# Patient Record
Sex: MALE | Race: WHITE | NOT HISPANIC OR LATINO | Employment: UNEMPLOYED | URBAN - METROPOLITAN AREA
[De-identification: names, ages, dates, MRNs, and addresses within clinical notes are randomized per-mention and may not be internally consistent; named-entity substitution may affect disease eponyms.]

---

## 2023-01-20 PROBLEM — M25.69 DECREASED RANGE OF MOTION OF TRUNK AND BACK: Status: ACTIVE | Noted: 2023-01-20

## 2023-01-20 PROBLEM — I10 ESSENTIAL HYPERTENSION: Status: ACTIVE | Noted: 2023-01-20

## 2023-01-20 PROBLEM — G47.33 OBSTRUCTIVE SLEEP APNEA: Status: ACTIVE | Noted: 2023-01-20

## 2023-01-20 PROBLEM — J42 CHRONIC BRONCHITIS (MULTI): Status: ACTIVE | Noted: 2023-01-20

## 2023-01-20 PROBLEM — G56.03 CARPAL TUNNEL SYNDROME, BILATERAL: Status: ACTIVE | Noted: 2023-01-20

## 2023-01-20 PROBLEM — M54.50 BACK PAIN, LUMBOSACRAL: Status: ACTIVE | Noted: 2023-01-20

## 2023-01-20 PROBLEM — H52.10 MYOPIA WITH PRESBYOPIA: Status: ACTIVE | Noted: 2023-01-20

## 2023-01-20 PROBLEM — R20.2 PARESTHESIAS: Status: ACTIVE | Noted: 2023-01-20

## 2023-01-20 PROBLEM — M48.07 SPINAL STENOSIS OF LUMBOSACRAL REGION: Status: ACTIVE | Noted: 2023-01-20

## 2023-01-20 PROBLEM — M51.369 DEGENERATION OF INTERVERTEBRAL DISC OF LUMBAR REGION: Status: ACTIVE | Noted: 2023-01-20

## 2023-01-20 PROBLEM — Z86.73 HISTORY OF CVA (CEREBROVASCULAR ACCIDENT): Status: ACTIVE | Noted: 2023-01-20

## 2023-01-20 PROBLEM — G89.18 POST-OP PAIN: Status: ACTIVE | Noted: 2023-01-20

## 2023-01-20 PROBLEM — G97.1 POST-DURAL PUNCTURE HEADACHE: Status: ACTIVE | Noted: 2023-01-20

## 2023-01-20 PROBLEM — R53.83 FATIGUE: Status: ACTIVE | Noted: 2023-01-20

## 2023-01-20 PROBLEM — I63.9 CEREBROVASCULAR ACCIDENT (MULTI): Status: ACTIVE | Noted: 2023-01-20

## 2023-01-20 PROBLEM — M54.12 CERVICAL RADICULOPATHY AT C7: Status: ACTIVE | Noted: 2023-01-20

## 2023-01-20 PROBLEM — I25.10 ATHEROSCLEROTIC HEART DISEASE OF NATIVE CORONARY ARTERY WITHOUT ANGINA PECTORIS: Status: ACTIVE | Noted: 2023-01-20

## 2023-01-20 PROBLEM — I51.89 DIASTOLIC DYSFUNCTION: Status: ACTIVE | Noted: 2023-01-20

## 2023-01-20 PROBLEM — M47.816 LUMBAR SPONDYLOSIS: Status: ACTIVE | Noted: 2023-01-20

## 2023-01-20 PROBLEM — F10.20 ALCOHOLISM, CHRONIC (MULTI): Status: ACTIVE | Noted: 2023-01-20

## 2023-01-20 PROBLEM — J44.9 CHRONIC OBSTRUCTIVE PULMONARY DISEASE, UNSPECIFIED (MULTI): Status: ACTIVE | Noted: 2023-01-20

## 2023-01-20 PROBLEM — G25.81 RESTLESS LEGS: Status: ACTIVE | Noted: 2023-01-20

## 2023-01-20 PROBLEM — H25.813 COMBINED FORM OF AGE-RELATED CATARACT, BOTH EYES: Status: ACTIVE | Noted: 2023-01-20

## 2023-01-20 PROBLEM — I25.10 CAD (CORONARY ARTERY DISEASE): Status: ACTIVE | Noted: 2023-01-20

## 2023-01-20 PROBLEM — R25.2 LEG CRAMP: Status: ACTIVE | Noted: 2023-01-20

## 2023-01-20 PROBLEM — K76.0 HEPATIC STEATOSIS: Status: ACTIVE | Noted: 2023-01-20

## 2023-01-20 PROBLEM — R22.32 MASS OF LEFT ELBOW: Status: ACTIVE | Noted: 2023-01-20

## 2023-01-20 PROBLEM — M54.16 LUMBAR RADICULOPATHY: Status: ACTIVE | Noted: 2023-01-20

## 2023-01-20 PROBLEM — M19.031 LOCALIZED PRIMARY OSTEOARTHRITIS OF CARPOMETACARPAL (CMC) JOINT OF RIGHT WRIST: Status: ACTIVE | Noted: 2023-01-20

## 2023-01-20 PROBLEM — F41.9 ANXIETY AND DEPRESSION: Status: ACTIVE | Noted: 2023-01-20

## 2023-01-20 PROBLEM — G56.22 ULNAR NEUROPATHY AT ELBOW OF LEFT UPPER EXTREMITY: Status: ACTIVE | Noted: 2023-01-20

## 2023-01-20 PROBLEM — F32.A ANXIETY AND DEPRESSION: Status: ACTIVE | Noted: 2023-01-20

## 2023-01-20 PROBLEM — N52.9 ERECTILE DYSFUNCTION: Status: ACTIVE | Noted: 2023-01-20

## 2023-01-20 PROBLEM — R06.02 SHORTNESS OF BREATH ON EXERTION: Status: ACTIVE | Noted: 2023-01-20

## 2023-01-20 PROBLEM — F17.210 CIGARETTE SMOKER: Status: ACTIVE | Noted: 2023-01-20

## 2023-01-20 PROBLEM — E78.5 HYPERLIPIDEMIA: Status: ACTIVE | Noted: 2023-01-20

## 2023-01-20 PROBLEM — M51.36 DEGENERATION OF INTERVERTEBRAL DISC OF LUMBAR REGION: Status: ACTIVE | Noted: 2023-01-20

## 2023-01-20 PROBLEM — H52.4 MYOPIA WITH PRESBYOPIA: Status: ACTIVE | Noted: 2023-01-20

## 2023-01-20 PROBLEM — N40.0 BPH (BENIGN PROSTATIC HYPERPLASIA): Status: ACTIVE | Noted: 2023-01-20

## 2023-01-20 RX ORDER — UMECLIDINIUM BROMIDE AND VILANTEROL TRIFENATATE 62.5; 25 UG/1; UG/1
1 POWDER RESPIRATORY (INHALATION) DAILY
COMMUNITY

## 2023-01-20 RX ORDER — NALOXONE HYDROCHLORIDE 4 MG/.1ML
4 SPRAY NASAL AS NEEDED
COMMUNITY
End: 2023-10-09 | Stop reason: ALTCHOICE

## 2023-01-20 RX ORDER — GABAPENTIN 400 MG/1
400 CAPSULE ORAL NIGHTLY
COMMUNITY
End: 2023-09-12 | Stop reason: ALTCHOICE

## 2023-01-20 RX ORDER — EZETIMIBE 10 MG/1
10 TABLET ORAL DAILY
COMMUNITY
End: 2023-09-12 | Stop reason: ALTCHOICE

## 2023-01-20 RX ORDER — OXYCODONE AND ACETAMINOPHEN 10; 325 MG/1; MG/1
1 TABLET ORAL EVERY 4 HOURS PRN
COMMUNITY
End: 2023-09-12 | Stop reason: ALTCHOICE

## 2023-01-20 RX ORDER — ATORVASTATIN CALCIUM 80 MG/1
1 TABLET, FILM COATED ORAL DAILY
COMMUNITY
End: 2023-03-07 | Stop reason: SDUPTHER

## 2023-01-20 RX ORDER — AMLODIPINE BESYLATE 5 MG/1
5 TABLET ORAL DAILY
COMMUNITY
End: 2023-09-12 | Stop reason: SDUPTHER

## 2023-01-20 RX ORDER — TIZANIDINE HYDROCHLORIDE 2 MG/1
2 CAPSULE, GELATIN COATED ORAL
COMMUNITY
End: 2023-03-07

## 2023-01-20 RX ORDER — NAPROXEN SODIUM 220 MG/1
81 TABLET, FILM COATED ORAL DAILY
COMMUNITY

## 2023-01-20 RX ORDER — ALBUTEROL SULFATE 90 UG/1
2 AEROSOL, METERED RESPIRATORY (INHALATION) EVERY 4 HOURS PRN
COMMUNITY

## 2023-01-20 RX ORDER — SERTRALINE HYDROCHLORIDE 100 MG/1
100 TABLET, FILM COATED ORAL DAILY
COMMUNITY
End: 2023-09-12 | Stop reason: ALTCHOICE

## 2023-01-20 RX ORDER — LISINOPRIL 40 MG/1
40 TABLET ORAL DAILY
COMMUNITY
End: 2023-05-14

## 2023-03-07 ENCOUNTER — LAB (OUTPATIENT)
Dept: LAB | Facility: LAB | Age: 63
End: 2023-03-07
Payer: COMMERCIAL

## 2023-03-07 ENCOUNTER — OFFICE VISIT (OUTPATIENT)
Dept: PRIMARY CARE | Facility: CLINIC | Age: 63
End: 2023-03-07
Payer: COMMERCIAL

## 2023-03-07 VITALS
OXYGEN SATURATION: 96 % | WEIGHT: 150 LBS | HEART RATE: 88 BPM | HEIGHT: 66 IN | TEMPERATURE: 97.8 F | BODY MASS INDEX: 24.11 KG/M2 | RESPIRATION RATE: 16 BRPM | SYSTOLIC BLOOD PRESSURE: 119 MMHG | DIASTOLIC BLOOD PRESSURE: 74 MMHG

## 2023-03-07 DIAGNOSIS — I10 ESSENTIAL HYPERTENSION: Primary | ICD-10-CM

## 2023-03-07 DIAGNOSIS — I10 ESSENTIAL HYPERTENSION: ICD-10-CM

## 2023-03-07 DIAGNOSIS — G47.33 OBSTRUCTIVE SLEEP APNEA: ICD-10-CM

## 2023-03-07 DIAGNOSIS — M54.16 LUMBAR RADICULOPATHY: ICD-10-CM

## 2023-03-07 DIAGNOSIS — F17.210 CIGARETTE SMOKER: ICD-10-CM

## 2023-03-07 DIAGNOSIS — F32.A ANXIETY AND DEPRESSION: ICD-10-CM

## 2023-03-07 DIAGNOSIS — I25.10 ATHEROSCLEROSIS OF NATIVE CORONARY ARTERY OF NATIVE HEART WITHOUT ANGINA PECTORIS: ICD-10-CM

## 2023-03-07 DIAGNOSIS — F41.9 ANXIETY AND DEPRESSION: ICD-10-CM

## 2023-03-07 DIAGNOSIS — J43.9 PULMONARY EMPHYSEMA, UNSPECIFIED EMPHYSEMA TYPE (MULTI): ICD-10-CM

## 2023-03-07 DIAGNOSIS — J41.1 MUCOPURULENT CHRONIC BRONCHITIS (MULTI): ICD-10-CM

## 2023-03-07 DIAGNOSIS — E78.2 MIXED HYPERLIPIDEMIA: ICD-10-CM

## 2023-03-07 DIAGNOSIS — M48.07 SPINAL STENOSIS OF LUMBOSACRAL REGION: ICD-10-CM

## 2023-03-07 DIAGNOSIS — N52.8 OTHER MALE ERECTILE DYSFUNCTION: ICD-10-CM

## 2023-03-07 DIAGNOSIS — I25.119 CORONARY ARTERY DISEASE INVOLVING NATIVE HEART WITH ANGINA PECTORIS, UNSPECIFIED VESSEL OR LESION TYPE (CMS-HCC): ICD-10-CM

## 2023-03-07 PROBLEM — R22.32 MASS OF LEFT ELBOW: Status: RESOLVED | Noted: 2023-01-20 | Resolved: 2023-03-07

## 2023-03-07 PROBLEM — R06.02 SHORTNESS OF BREATH ON EXERTION: Status: RESOLVED | Noted: 2023-01-20 | Resolved: 2023-03-07

## 2023-03-07 LAB
ALANINE AMINOTRANSFERASE (SGPT) (U/L) IN SER/PLAS: 45 U/L (ref 10–52)
ALBUMIN (G/DL) IN SER/PLAS: 4.8 G/DL (ref 3.4–5)
ALKALINE PHOSPHATASE (U/L) IN SER/PLAS: 81 U/L (ref 33–136)
ANION GAP IN SER/PLAS: 9 MMOL/L (ref 10–20)
ASPARTATE AMINOTRANSFERASE (SGOT) (U/L) IN SER/PLAS: 42 U/L (ref 9–39)
BILIRUBIN TOTAL (MG/DL) IN SER/PLAS: 0.4 MG/DL (ref 0–1.2)
CALCIUM (MG/DL) IN SER/PLAS: 9.6 MG/DL (ref 8.6–10.3)
CARBON DIOXIDE, TOTAL (MMOL/L) IN SER/PLAS: 27 MMOL/L (ref 21–32)
CHLORIDE (MMOL/L) IN SER/PLAS: 104 MMOL/L (ref 98–107)
CHOLESTEROL (MG/DL) IN SER/PLAS: 158 MG/DL (ref 0–199)
CHOLESTEROL IN HDL (MG/DL) IN SER/PLAS: 47 MG/DL
CHOLESTEROL/HDL RATIO: 3.4
CREATININE (MG/DL) IN SER/PLAS: 0.75 MG/DL (ref 0.5–1.3)
GFR MALE: >90 ML/MIN/1.73M2
GLUCOSE (MG/DL) IN SER/PLAS: 95 MG/DL (ref 74–99)
LDL: 83 MG/DL (ref 0–99)
POTASSIUM (MMOL/L) IN SER/PLAS: 5.1 MMOL/L (ref 3.5–5.3)
PROTEIN TOTAL: 7.5 G/DL (ref 6.4–8.2)
SODIUM (MMOL/L) IN SER/PLAS: 135 MMOL/L (ref 136–145)
TRIGLYCERIDE (MG/DL) IN SER/PLAS: 139 MG/DL (ref 0–149)
UREA NITROGEN (MG/DL) IN SER/PLAS: 22 MG/DL (ref 6–23)
VLDL: 28 MG/DL (ref 0–40)

## 2023-03-07 PROCEDURE — 3078F DIAST BP <80 MM HG: CPT | Performed by: STUDENT IN AN ORGANIZED HEALTH CARE EDUCATION/TRAINING PROGRAM

## 2023-03-07 PROCEDURE — 80061 LIPID PANEL: CPT

## 2023-03-07 PROCEDURE — 1036F TOBACCO NON-USER: CPT | Performed by: STUDENT IN AN ORGANIZED HEALTH CARE EDUCATION/TRAINING PROGRAM

## 2023-03-07 PROCEDURE — 3074F SYST BP LT 130 MM HG: CPT | Performed by: STUDENT IN AN ORGANIZED HEALTH CARE EDUCATION/TRAINING PROGRAM

## 2023-03-07 PROCEDURE — 80053 COMPREHEN METABOLIC PANEL: CPT

## 2023-03-07 PROCEDURE — 99214 OFFICE O/P EST MOD 30 MIN: CPT | Performed by: STUDENT IN AN ORGANIZED HEALTH CARE EDUCATION/TRAINING PROGRAM

## 2023-03-07 PROCEDURE — 36415 COLL VENOUS BLD VENIPUNCTURE: CPT

## 2023-03-07 RX ORDER — ATORVASTATIN CALCIUM 80 MG/1
80 TABLET, FILM COATED ORAL DAILY
Qty: 30 TABLET | Refills: 3 | Status: SHIPPED | OUTPATIENT
Start: 2023-03-07 | End: 2023-09-12 | Stop reason: SDUPTHER

## 2023-03-07 RX ORDER — SILDENAFIL 100 MG/1
1 TABLET, FILM COATED ORAL 2 TIMES WEEKLY
COMMUNITY
Start: 2023-02-03 | End: 2023-09-12 | Stop reason: SDUPTHER

## 2023-03-07 RX ORDER — CARVEDILOL 6.25 MG/1
6.25 TABLET ORAL
COMMUNITY
Start: 2023-03-02

## 2023-03-07 RX ORDER — OXYCODONE HYDROCHLORIDE 10 MG/1
10 TABLET ORAL EVERY 4 HOURS PRN
COMMUNITY
Start: 2022-11-23 | End: 2023-09-12 | Stop reason: ALTCHOICE

## 2023-03-07 SDOH — ECONOMIC STABILITY: FOOD INSECURITY: WITHIN THE PAST 12 MONTHS, YOU WORRIED THAT YOUR FOOD WOULD RUN OUT BEFORE YOU GOT MONEY TO BUY MORE.: NEVER TRUE

## 2023-03-07 SDOH — ECONOMIC STABILITY: FOOD INSECURITY: WITHIN THE PAST 12 MONTHS, THE FOOD YOU BOUGHT JUST DIDN'T LAST AND YOU DIDN'T HAVE MONEY TO GET MORE.: NEVER TRUE

## 2023-03-07 ASSESSMENT — PATIENT HEALTH QUESTIONNAIRE - PHQ9
2. FEELING DOWN, DEPRESSED OR HOPELESS: NOT AT ALL
SUM OF ALL RESPONSES TO PHQ9 QUESTIONS 1 & 2: 0
1. LITTLE INTEREST OR PLEASURE IN DOING THINGS: NOT AT ALL

## 2023-03-07 ASSESSMENT — LIFESTYLE VARIABLES
SKIP TO QUESTIONS 9-10: 0
HOW MANY STANDARD DRINKS CONTAINING ALCOHOL DO YOU HAVE ON A TYPICAL DAY: 5 OR 6
HOW OFTEN DO YOU HAVE A DRINK CONTAINING ALCOHOL: 4 OR MORE TIMES A WEEK
HOW OFTEN DO YOU HAVE SIX OR MORE DRINKS ON ONE OCCASION: DAILY OR ALMOST DAILY
AUDIT-C TOTAL SCORE: 10

## 2023-03-07 ASSESSMENT — ENCOUNTER SYMPTOMS
LOSS OF SENSATION IN FEET: 1
OCCASIONAL FEELINGS OF UNSTEADINESS: 0
DEPRESSION: 0

## 2023-03-07 NOTE — ASSESSMENT & PLAN NOTE
Chronic  S/p laminectomy by Dr. Lin   Currently on gabapentin, percocet as needed by pain management and oxycodone by orthopedics  High recommend to not use percocet and oxycodone at same time. Pt understands and states using those only as needed and separately.  Continue with pain management and orthopedics

## 2023-03-07 NOTE — PROGRESS NOTES
"Subjective   Patient ID: Isael Haji is a 62 y.o. male who presents for Follow-up (6 month follow-up. No health concerns.).    Pt presenting to follow up. Overall doing ok.  Has had back surgery last yesterday which did not seem to help too much with her neuralgia. He continues to have some pain radiating down his legs. He continues to see pain management as well.   Concerning his breathing, he states that he has yet to complete his PFTs ordered by his pulmonologist. He denies any cp, sob, dizziness, HA, leg edema.  Still not using his cpap, he is not able to tolerate it.          Review of Systems   All other systems reviewed and are negative.      Objective   /74 (BP Location: Left arm, Patient Position: Sitting, BP Cuff Size: Large adult)   Pulse 88   Temp 36.6 °C (97.8 °F)   Resp 16   Ht 1.664 m (5' 5.5\")   Wt 68 kg (150 lb)   SpO2 96%   BMI 24.58 kg/m²     Physical Exam  Vitals and nursing note reviewed.   Constitutional:       General: He is not in acute distress.     Appearance: He is not ill-appearing.   HENT:      Head: Normocephalic and atraumatic.   Cardiovascular:      Rate and Rhythm: Normal rate and regular rhythm.      Pulses: Normal pulses.      Heart sounds: Normal heart sounds. No murmur heard.     No friction rub. No gallop.   Pulmonary:      Effort: Pulmonary effort is normal. No respiratory distress.      Breath sounds: Normal breath sounds. No stridor. No wheezing, rhonchi or rales.   Neurological:      Mental Status: He is alert and oriented to person, place, and time.   Psychiatric:         Mood and Affect: Mood normal.         Assessment/Plan   Problem List Items Addressed This Visit          Nervous    Lumbar radiculopathy     Chronic  S/p laminectomy by Dr. Lin   Currently on gabapentin, percocet as needed by pain management and oxycodone by orthopedics  High recommend to not use percocet and oxycodone at same time. Pt understands and states using those only as needed and " separately.  Continue with pain management and orthopedics         Obstructive sleep apnea     Chronic and mild  Intolerant of cpap   Encourage compliance         Relevant Orders    Follow Up In Primary Care    Spinal stenosis of lumbosacral region       Respiratory    Chronic bronchitis (CMS/HCC)     Stable  Continue with anoro ellipta and albuterol as needed  Encouraged to stop smoking         Relevant Orders    Follow Up In Primary Care    Chronic obstructive pulmonary disease, unspecified (CMS/HCC)     Stable  Continue with anoro ellipta and albuterol as needed  Encouraged to stop smoking         Relevant Orders    Follow Up In Primary Care    Follow Up In Primary Care       Circulatory    Atherosclerotic heart disease of native coronary artery without angina pectoris     Stable  Continue with zetia and atorvastatin         Relevant Medications    atorvastatin (Lipitor) 80 mg tablet    carvedilol (Coreg) 6.25 mg tablet    sildenafil (Viagra) 100 mg tablet    Other Relevant Orders    Follow Up In Primary Care    CAD (coronary artery disease)     Stable  Continue with zetia and atorvastatin         Relevant Medications    atorvastatin (Lipitor) 80 mg tablet    carvedilol (Coreg) 6.25 mg tablet    sildenafil (Viagra) 100 mg tablet    Other Relevant Orders    Follow Up In Primary Care    Essential hypertension - Primary     Chronic and stable  Continue with lisinopril, amlodipine and carvedilol         Relevant Orders    Comprehensive Metabolic Panel    Follow Up In Primary Care       Genitourinary    Erectile dysfunction     Chronic and stable  Follows up with urology  Viagra 100 mg twice a week as needed          Relevant Orders    Follow Up In Primary Care       Other    Anxiety and depression     Stable  Continue with sertraline         Relevant Orders    Follow Up In Primary Care    Cigarette smoker     Chronic  Continue to smoke- not interested in quitting         Hyperlipidemia     Stable  Continue with  crestor and atorvastatin         Relevant Medications    atorvastatin (Lipitor) 80 mg tablet    Other Relevant Orders    Lipid Panel    Follow Up In Primary Care

## 2023-03-07 NOTE — ASSESSMENT & PLAN NOTE
>>ASSESSMENT AND PLAN FOR CAD (CORONARY ARTERY DISEASE) WRITTEN ON 3/7/2023  9:14 AM BY EDNA FOREMAN MD    Stable  Continue with zetia and atorvastatin

## 2023-03-07 NOTE — ASSESSMENT & PLAN NOTE
>>ASSESSMENT AND PLAN FOR CIGARETTE SMOKER WRITTEN ON 3/7/2023  9:15 AM BY EDNA FOREMAN MD    Chronic  Continue to smoke- not interested in quitting

## 2023-05-12 DIAGNOSIS — I25.10 ATHEROSCLEROSIS OF NATIVE CORONARY ARTERY OF NATIVE HEART WITHOUT ANGINA PECTORIS: Primary | ICD-10-CM

## 2023-05-12 RX ORDER — HYDROCODONE BITARTRATE AND ACETAMINOPHEN 5; 325 MG/1; MG/1
TABLET ORAL
COMMUNITY
Start: 2022-05-04 | End: 2023-09-12 | Stop reason: ALTCHOICE

## 2023-05-14 RX ORDER — LISINOPRIL 40 MG/1
TABLET ORAL
Qty: 90 TABLET | Refills: 2 | Status: SHIPPED | OUTPATIENT
Start: 2023-05-14 | End: 2023-09-12 | Stop reason: SDUPTHER

## 2023-06-02 LAB
ALANINE AMINOTRANSFERASE (SGPT) (U/L) IN SER/PLAS: 64 U/L (ref 10–52)
ALBUMIN (G/DL) IN SER/PLAS: 4.6 G/DL (ref 3.4–5)
ALKALINE PHOSPHATASE (U/L) IN SER/PLAS: 69 U/L (ref 33–136)
ANION GAP IN SER/PLAS: 11 MMOL/L (ref 10–20)
ASPARTATE AMINOTRANSFERASE (SGOT) (U/L) IN SER/PLAS: 65 U/L (ref 9–39)
BILIRUBIN TOTAL (MG/DL) IN SER/PLAS: 0.5 MG/DL (ref 0–1.2)
CALCIUM (MG/DL) IN SER/PLAS: 9.7 MG/DL (ref 8.6–10.3)
CARBON DIOXIDE, TOTAL (MMOL/L) IN SER/PLAS: 27 MMOL/L (ref 21–32)
CHLORIDE (MMOL/L) IN SER/PLAS: 104 MMOL/L (ref 98–107)
CHOLESTEROL (MG/DL) IN SER/PLAS: 109 MG/DL (ref 0–199)
CHOLESTEROL IN HDL (MG/DL) IN SER/PLAS: 53 MG/DL
CHOLESTEROL/HDL RATIO: 2.1
CREATININE (MG/DL) IN SER/PLAS: 0.85 MG/DL (ref 0.5–1.3)
ERYTHROCYTE DISTRIBUTION WIDTH (RATIO) BY AUTOMATED COUNT: 13.6 % (ref 11.5–14.5)
ERYTHROCYTE MEAN CORPUSCULAR HEMOGLOBIN CONCENTRATION (G/DL) BY AUTOMATED: 33.8 G/DL (ref 32–36)
ERYTHROCYTE MEAN CORPUSCULAR VOLUME (FL) BY AUTOMATED COUNT: 99 FL (ref 80–100)
ERYTHROCYTES (10*6/UL) IN BLOOD BY AUTOMATED COUNT: 3.78 X10E12/L (ref 4.5–5.9)
GFR MALE: >90 ML/MIN/1.73M2
GLUCOSE (MG/DL) IN SER/PLAS: 99 MG/DL (ref 74–99)
HEMATOCRIT (%) IN BLOOD BY AUTOMATED COUNT: 37.3 % (ref 41–52)
HEMOGLOBIN (G/DL) IN BLOOD: 12.6 G/DL (ref 13.5–17.5)
LDL: 48 MG/DL (ref 0–99)
LEUKOCYTES (10*3/UL) IN BLOOD BY AUTOMATED COUNT: 7.7 X10E9/L (ref 4.4–11.3)
MAGNESIUM (MG/DL) IN SER/PLAS: 1.7 MG/DL (ref 1.6–2.4)
PLATELETS (10*3/UL) IN BLOOD AUTOMATED COUNT: 227 X10E9/L (ref 150–450)
POTASSIUM (MMOL/L) IN SER/PLAS: 4.5 MMOL/L (ref 3.5–5.3)
PROTEIN TOTAL: 7.1 G/DL (ref 6.4–8.2)
SODIUM (MMOL/L) IN SER/PLAS: 137 MMOL/L (ref 136–145)
TRIGLYCERIDE (MG/DL) IN SER/PLAS: 41 MG/DL (ref 0–149)
UREA NITROGEN (MG/DL) IN SER/PLAS: 11 MG/DL (ref 6–23)
VLDL: 8 MG/DL (ref 0–40)

## 2023-08-31 PROBLEM — R25.2 MUSCLE CRAMPING: Status: ACTIVE | Noted: 2023-08-31

## 2023-08-31 PROBLEM — M79.2 NEUROPATHIC PAIN: Status: ACTIVE | Noted: 2023-08-31

## 2023-08-31 RX ORDER — METHOCARBAMOL 500 MG/1
1 TABLET, FILM COATED ORAL 2 TIMES DAILY PRN
COMMUNITY
Start: 2023-08-08 | End: 2023-09-12 | Stop reason: ALTCHOICE

## 2023-09-12 ENCOUNTER — OFFICE VISIT (OUTPATIENT)
Dept: PRIMARY CARE | Facility: CLINIC | Age: 63
End: 2023-09-12
Payer: COMMERCIAL

## 2023-09-12 VITALS
BODY MASS INDEX: 22.82 KG/M2 | SYSTOLIC BLOOD PRESSURE: 121 MMHG | HEIGHT: 66 IN | OXYGEN SATURATION: 99 % | WEIGHT: 142 LBS | HEART RATE: 74 BPM | TEMPERATURE: 96.7 F | RESPIRATION RATE: 16 BRPM | DIASTOLIC BLOOD PRESSURE: 81 MMHG

## 2023-09-12 DIAGNOSIS — R25.2 LEG CRAMP: ICD-10-CM

## 2023-09-12 DIAGNOSIS — F41.9 ANXIETY AND DEPRESSION: ICD-10-CM

## 2023-09-12 DIAGNOSIS — M54.50 BACK PAIN, LUMBOSACRAL: ICD-10-CM

## 2023-09-12 DIAGNOSIS — F32.A ANXIETY AND DEPRESSION: ICD-10-CM

## 2023-09-12 DIAGNOSIS — Z23 NEED FOR VACCINATION AGAINST STREPTOCOCCUS PNEUMONIAE USING PNEUMOCOCCAL CONJUGATE VACCINE 13: ICD-10-CM

## 2023-09-12 DIAGNOSIS — Z23 NEED FOR SHINGLES VACCINE: ICD-10-CM

## 2023-09-12 DIAGNOSIS — M54.16 LUMBAR RADICULOPATHY: ICD-10-CM

## 2023-09-12 DIAGNOSIS — Z00.00 ROUTINE GENERAL MEDICAL EXAMINATION AT HEALTH CARE FACILITY: ICD-10-CM

## 2023-09-12 DIAGNOSIS — I25.10 ATHEROSCLEROSIS OF NATIVE CORONARY ARTERY OF NATIVE HEART WITHOUT ANGINA PECTORIS: ICD-10-CM

## 2023-09-12 DIAGNOSIS — I25.119 CORONARY ARTERY DISEASE INVOLVING NATIVE HEART WITH ANGINA PECTORIS, UNSPECIFIED VESSEL OR LESION TYPE (CMS-HCC): ICD-10-CM

## 2023-09-12 DIAGNOSIS — J43.9 PULMONARY EMPHYSEMA, UNSPECIFIED EMPHYSEMA TYPE (MULTI): ICD-10-CM

## 2023-09-12 DIAGNOSIS — G47.33 OBSTRUCTIVE SLEEP APNEA: ICD-10-CM

## 2023-09-12 DIAGNOSIS — J41.1 MUCOPURULENT CHRONIC BRONCHITIS (MULTI): ICD-10-CM

## 2023-09-12 DIAGNOSIS — I73.9 INTERMITTENT CLAUDICATION (CMS-HCC): ICD-10-CM

## 2023-09-12 DIAGNOSIS — N52.8 OTHER MALE ERECTILE DYSFUNCTION: ICD-10-CM

## 2023-09-12 DIAGNOSIS — I10 ESSENTIAL HYPERTENSION: Primary | ICD-10-CM

## 2023-09-12 DIAGNOSIS — E78.2 MIXED HYPERLIPIDEMIA: ICD-10-CM

## 2023-09-12 PROBLEM — M25.512 ACUTE PAIN OF LEFT SHOULDER: Status: ACTIVE | Noted: 2018-01-19

## 2023-09-12 PROBLEM — F17.200 NICOTINE DEPENDENCE: Status: ACTIVE | Noted: 2020-11-28

## 2023-09-12 PROBLEM — J44.9 COPD (CHRONIC OBSTRUCTIVE PULMONARY DISEASE) (MULTI): Status: ACTIVE | Noted: 2020-11-28

## 2023-09-12 PROBLEM — R07.9 CHEST PAIN: Status: ACTIVE | Noted: 2018-01-19

## 2023-09-12 PROBLEM — F10.10 ETOH ABUSE: Status: ACTIVE | Noted: 2018-01-19

## 2023-09-12 PROBLEM — Z95.5 PRESENCE OF CORONARY ANGIOPLASTY IMPLANT AND GRAFT: Status: ACTIVE | Noted: 2020-11-28

## 2023-09-12 PROCEDURE — G0438 PPPS, INITIAL VISIT: HCPCS

## 2023-09-12 PROCEDURE — 99396 PREV VISIT EST AGE 40-64: CPT

## 2023-09-12 PROCEDURE — 99214 OFFICE O/P EST MOD 30 MIN: CPT

## 2023-09-12 PROCEDURE — 3074F SYST BP LT 130 MM HG: CPT

## 2023-09-12 PROCEDURE — 3079F DIAST BP 80-89 MM HG: CPT

## 2023-09-12 PROCEDURE — 1036F TOBACCO NON-USER: CPT

## 2023-09-12 RX ORDER — AMLODIPINE BESYLATE 5 MG/1
5 TABLET ORAL DAILY
Qty: 90 TABLET | Refills: 3 | Status: SHIPPED | OUTPATIENT
Start: 2023-09-12 | End: 2024-05-06 | Stop reason: SDUPTHER

## 2023-09-12 RX ORDER — NEBULIZER AND COMPRESSOR
1 EACH MISCELLANEOUS DAILY
Qty: 1 EACH | Refills: 0 | Status: SHIPPED | OUTPATIENT
Start: 2023-09-12

## 2023-09-12 RX ORDER — SILDENAFIL 100 MG/1
100 TABLET, FILM COATED ORAL 2 TIMES WEEKLY
Qty: 10 TABLET | Refills: 3 | Status: SHIPPED | OUTPATIENT
Start: 2023-09-14 | End: 2024-01-03

## 2023-09-12 RX ORDER — GABAPENTIN 600 MG/1
600 TABLET ORAL DAILY
COMMUNITY
Start: 2023-09-06 | End: 2023-09-12 | Stop reason: SDUPTHER

## 2023-09-12 RX ORDER — GABAPENTIN 600 MG/1
600 TABLET ORAL DAILY
Qty: 90 TABLET | Refills: 3 | Status: SHIPPED | OUTPATIENT
Start: 2023-09-12 | End: 2024-04-08 | Stop reason: ALTCHOICE

## 2023-09-12 RX ORDER — ATORVASTATIN CALCIUM 80 MG/1
80 TABLET, FILM COATED ORAL DAILY
Qty: 90 TABLET | Refills: 3 | Status: SHIPPED | OUTPATIENT
Start: 2023-09-12 | End: 2024-05-15

## 2023-09-12 RX ORDER — LISINOPRIL 40 MG/1
40 TABLET ORAL DAILY
Qty: 90 TABLET | Refills: 3 | Status: SHIPPED | OUTPATIENT
Start: 2023-09-12 | End: 2024-03-18

## 2023-09-12 SDOH — ECONOMIC STABILITY: FOOD INSECURITY: WITHIN THE PAST 12 MONTHS, YOU WORRIED THAT YOUR FOOD WOULD RUN OUT BEFORE YOU GOT MONEY TO BUY MORE.: NEVER TRUE

## 2023-09-12 SDOH — ECONOMIC STABILITY: FOOD INSECURITY: WITHIN THE PAST 12 MONTHS, THE FOOD YOU BOUGHT JUST DIDN'T LAST AND YOU DIDN'T HAVE MONEY TO GET MORE.: NEVER TRUE

## 2023-09-12 ASSESSMENT — ENCOUNTER SYMPTOMS
NUMBNESS: 1
ALLERGIC/IMMUNOLOGIC NEGATIVE: 1
LOSS OF SENSATION IN FEET: 0
PSYCHIATRIC NEGATIVE: 1
GASTROINTESTINAL NEGATIVE: 1
ENDOCRINE NEGATIVE: 1
CONSTITUTIONAL NEGATIVE: 1
DEPRESSION: 0
CARDIOVASCULAR NEGATIVE: 1
OCCASIONAL FEELINGS OF UNSTEADINESS: 0
RESPIRATORY NEGATIVE: 1
HEMATOLOGIC/LYMPHATIC NEGATIVE: 1
MYALGIAS: 1
EYES NEGATIVE: 1

## 2023-09-12 ASSESSMENT — PATIENT HEALTH QUESTIONNAIRE - PHQ9
1. LITTLE INTEREST OR PLEASURE IN DOING THINGS: NOT AT ALL
SUM OF ALL RESPONSES TO PHQ9 QUESTIONS 1 & 2: 0
2. FEELING DOWN, DEPRESSED OR HOPELESS: NOT AT ALL

## 2023-09-12 ASSESSMENT — ACTIVITIES OF DAILY LIVING (ADL)
DOING_HOUSEWORK: INDEPENDENT
TAKING_MEDICATION: INDEPENDENT
GROCERY_SHOPPING: INDEPENDENT
DRESSING: INDEPENDENT
MANAGING_FINANCES: INDEPENDENT
BATHING: INDEPENDENT

## 2023-09-12 ASSESSMENT — PAIN SCALES - GENERAL: PAINLEVEL: 8

## 2023-09-12 NOTE — PROGRESS NOTES
Subjective   Patient ID: Isael Haji is a 63 y.o. male who presents for HTN follow and medicare wellness.    Diet: sandwiches, pork chops cooked in crock pot. Fried chicken monthly. Pork loins. Beef hamburgers and steak x3 times per month. Greenbeans. No at lot of processed foods. X2 cups of coffee per day with milk. Sweat tea 20oz bottle per day.   Exercise: No regular exercise, due to COPD  Weight: stable  Water: 1 bottle per day  Sleep: Not good due to leg cramping. Six hours of sleep.   Social: . 1 son, near by. Mobile home. Sister lives with him, has a pit bull.   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     Review of Systems   Constitutional: Negative.    HENT: Negative.     Eyes: Negative.    Respiratory: Negative.     Cardiovascular: Negative.    Gastrointestinal: Negative.    Endocrine: Negative.    Genitourinary: Negative.    Musculoskeletal:  Positive for myalgias.   Skin: Negative.    Allergic/Immunologic: Negative.    Neurological:  Positive for numbness.        Feet, occasional.   Hematological: Negative.    Psychiatric/Behavioral: Negative.          Current Outpatient Medications   Medication Sig Dispense Refill    albuterol 90 mcg/actuation inhaler Inhale 2 puffs every 4 hours if needed.      aspirin 81 mg chewable tablet Chew 1 tablet (81 mg) once daily. Chew and swallow.      carvedilol (Coreg) 6.25 mg tablet Take 1 tablet (6.25 mg) by mouth 2 times a day with meals.      naloxone (Narcan) 4 mg/0.1 mL nasal spray Administer 1 spray (4 mg) into affected nostril(s) if needed for opioid reversal or respiratory depression. May repeat every 2-3 minutes if needed, alternating nostrils, until medical assistance becomes available.      umeclidinium-vilanteroL (Anoro Ellipta) 62.5-25 mcg/actuation blister with device Inhale 1 puff once daily.      amLODIPine (Norvasc) 5 mg tablet Take 1 tablet (5 mg) by mouth once daily. 90 tablet 3    atorvastatin (Lipitor) 80 mg tablet Take 1  "tablet (80 mg) by mouth once daily. 90 tablet 3    gabapentin (Neurontin) 600 mg tablet Take 1 tablet (600 mg) by mouth once daily. 90 tablet 3    lisinopril 40 mg tablet Take 1 tablet (40 mg) by mouth once daily. 90 tablet 3    miscellaneous medical supply (Blood Pressure Cuff) misc 1 Units once daily. 1 each 0    sildenafil (Viagra) 100 mg tablet Take 1 tablet (100 mg) by mouth 2 times a week. As needed 10 tablet 3     No current facility-administered medications for this visit.     Past Surgical History:   Procedure Laterality Date    MR HEAD ANGIO WO IV CONTRAST  11/29/2020    MR HEAD ANGIO WO IV CONTRAST 11/29/2020 [facility]    MR NECK ANGIO WO IV CONTRAST  11/29/2020    MR NECK ANGIO WO IV CONTRAST 11/29/2020 [facility]    OTHER SURGICAL HISTORY  12/09/2020    Rotator cuff repair    OTHER SURGICAL HISTORY  12/09/2020    Cardiac catheterization with stent placement    OTHER SURGICAL HISTORY  12/29/2022    Lower back surgery    OTHER SURGICAL HISTORY  08/31/2021    Arm surgery     Family History   Problem Relation Name Age of Onset    Diabetes Mother      Lung cancer Mother      Coronary artery disease Father        Social History     Tobacco Use    Smoking status: Former     Packs/day: 1     Types: Cigarettes    Smokeless tobacco: Never   Vaping Use    Vaping Use: Never used   Substance Use Topics    Alcohol use: Yes     Alcohol/week: 60.0 standard drinks of alcohol     Types: 60 Cans of beer per week    Drug use: Never        Objective     Visit Vitals  /81 (BP Location: Right arm, Patient Position: Sitting, BP Cuff Size: Adult)   Pulse 74   Temp 35.9 °C (96.7 °F) (Temporal)   Resp 16   Ht 1.664 m (5' 5.5\")   Wt 64.4 kg (142 lb)   SpO2 99%   BMI 23.27 kg/m²   Smoking Status Former   BSA 1.73 m²        Physical Exam  Constitutional:       Appearance: Normal appearance. He is normal weight.   HENT:      Head: Normocephalic and atraumatic.      Mouth/Throat:      Mouth: Mucous membranes " are moist.   Eyes:      Extraocular Movements: Extraocular movements intact.   Cardiovascular:      Rate and Rhythm: Normal rate and regular rhythm.      Pulses:           Posterior tibial pulses are 1+ on the right side and 1+ on the left side.      Heart sounds: Normal heart sounds.   Pulmonary:      Effort: Pulmonary effort is normal.      Breath sounds: Normal breath sounds.   Abdominal:      General: Abdomen is flat.      Palpations: Abdomen is soft.   Musculoskeletal:      Cervical back: Neck supple.      Right lower leg: No edema.      Left lower leg: No edema.   Skin:     General: Skin is warm and dry.      Capillary Refill: Capillary refill takes less than 2 seconds.   Neurological:      General: No focal deficit present.      Mental Status: He is alert.   Psychiatric:         Mood and Affect: Mood normal.           Assessment/Plan   Problem List Items Addressed This Visit       Anxiety and depression    Atherosclerotic heart disease of native coronary artery without angina pectoris    Relevant Medications    amLODIPine (Norvasc) 5 mg tablet    atorvastatin (Lipitor) 80 mg tablet    lisinopril 40 mg tablet    sildenafil (Viagra) 100 mg tablet    Back pain, lumbosacral     Pt reports he is currently seeing pain management for his back.   On current therapy of gabapentin 600mg per night.   Starting physical therapy for legs and back in the next month.   Next appointment next week, have been seeing for about a month.          CAD (coronary artery disease)    Relevant Medications    amLODIPine (Norvasc) 5 mg tablet    atorvastatin (Lipitor) 80 mg tablet    sildenafil (Viagra) 100 mg tablet    Chronic bronchitis (CMS/HCC)    Chronic obstructive pulmonary disease, unspecified (CMS/HCC)     Pt following with pulmonology Coco Gonsalez in 9/15/23  Pt reporting control on current therapy.   Compliant with screening CT of lungs.   Continue current therapy.         Erectile dysfunction    Relevant Medications     sildenafil (Viagra) 100 mg tablet    Essential hypertension - Primary     Blood pressure in office today 121/81. Pt reports that he occasionally takes readings a couple times a week. Does not recall readings, states he thinks they were high 150s/80s.   Encourage to take blood pressure readings at home. Prescription for blood pressure cuff given.  Continue lisinopril 40mg per day and carvedilol 6.25mg         Relevant Medications    miscellaneous medical supply (Blood Pressure Cuff) misc    amLODIPine (Norvasc) 5 mg tablet    Other Relevant Orders    CBC    Comprehensive metabolic panel    Follow Up In Primary Care - Established    Hyperlipidemia    Relevant Medications    atorvastatin (Lipitor) 80 mg tablet    Leg cramp    Relevant Orders    Magnesium    Vascular US ankle brachial index (JULIO CÉSAR) without exercise    Lumbar radiculopathy    Relevant Medications    gabapentin (Neurontin) 600 mg tablet    Obstructive sleep apnea     Other Visit Diagnoses       Need for shingles vaccine        Need for vaccination against Streptococcus pneumoniae using pneumococcal conjugate vaccine 13        Intermittent claudication (CMS/HCC)        Relevant Orders    Vascular US ankle brachial index (JULIO CÉSAR) without exercise    Routine general medical examination at health care facility                All pertinent lab work and results were reviewed with patient.     Follow up 4-6 months or sooner as needed    Arelis Zee, APRN-CNS

## 2023-09-12 NOTE — PATIENT INSTRUCTIONS
Thank you for coming to see me today.  If you have any questions or concerns following our visit, please contact the office.  Phone: [phone]    Follow up with me in 3-4 months.     1)  Purchase a blood pressure cuff. Check blood pressures at home 4-6 times per week, making sure you rest 5 minutes prior to taking a reading.  Write the readings down on blood pressure log and bring this log to your next visit with me.     2) Please schedule a ankle brachial index - please call [phone] or stop to 's office (in the lab office) on your way out today.     3) Obtain fasting labwork, [address].     4) Increase daily water intake to 64oz, reduce salt intake, increase lean meat intake, increase exercise as tolerated.     5) Please decrease alcohol intake to no more than 1 drink per night or 3-5 per week due to damage to your liver seen on labwork

## 2023-09-14 PROBLEM — J44.9 COPD (CHRONIC OBSTRUCTIVE PULMONARY DISEASE) (MULTI): Status: RESOLVED | Noted: 2020-11-28 | Resolved: 2023-09-14

## 2023-09-14 NOTE — ASSESSMENT & PLAN NOTE
Pt following with pulmonology Coco Gonsalez in 9/15/23  Pt reporting control on current therapy.   Compliant with screening CT of lungs.   Continue current therapy.

## 2023-09-14 NOTE — ASSESSMENT & PLAN NOTE
Pt reports he is currently seeing pain management for his back.   On current therapy of gabapentin 600mg per night.   Starting physical therapy for legs and back in the next month.   Next appointment next week, have been seeing for about a month.

## 2023-09-14 NOTE — ASSESSMENT & PLAN NOTE
Blood pressure in office today 121/81. Pt reports that he occasionally takes readings a couple times a week. Does not recall readings, states he thinks they were high 150s/80s.   Encourage to take blood pressure readings at home. Prescription for blood pressure cuff given.  Continue lisinopril 40mg per day and carvedilol 6.25mg

## 2023-09-20 ENCOUNTER — CLINICAL SUPPORT (OUTPATIENT)
Dept: PRIMARY CARE | Facility: CLINIC | Age: 63
End: 2023-09-20
Payer: COMMERCIAL

## 2023-09-20 DIAGNOSIS — Z23 NEED FOR PNEUMOCOCCAL 20-VALENT CONJUGATE VACCINATION: Primary | ICD-10-CM

## 2023-09-20 PROBLEM — R26.9 GAIT ABNORMALITY: Status: RESOLVED | Noted: 2023-09-20 | Resolved: 2023-09-20

## 2023-09-20 PROBLEM — R29.898 WEAKNESS OF BOTH LOWER EXTREMITIES: Status: RESOLVED | Noted: 2023-09-20 | Resolved: 2023-09-20

## 2023-09-20 PROCEDURE — 90677 PCV20 VACCINE IM: CPT

## 2023-09-20 PROCEDURE — G0009 ADMIN PNEUMOCOCCAL VACCINE: HCPCS

## 2023-10-02 ENCOUNTER — HOSPITAL ENCOUNTER (OUTPATIENT)
Dept: VASCULAR MEDICINE | Facility: HOSPITAL | Age: 63
Discharge: HOME | End: 2023-10-02
Payer: COMMERCIAL

## 2023-10-02 ENCOUNTER — HOSPITAL ENCOUNTER (OUTPATIENT)
Dept: RADIOLOGY | Facility: HOSPITAL | Age: 63
Discharge: HOME | End: 2023-10-02
Payer: COMMERCIAL

## 2023-10-02 DIAGNOSIS — I73.9 INTERMITTENT CLAUDICATION (CMS-HCC): ICD-10-CM

## 2023-10-02 DIAGNOSIS — F17.210 NICOTINE DEPENDENCE, CIGARETTES, UNCOMPLICATED: ICD-10-CM

## 2023-10-02 DIAGNOSIS — R25.2 LEG CRAMP: ICD-10-CM

## 2023-10-02 PROCEDURE — 71271 CT THORAX LUNG CANCER SCR C-: CPT | Performed by: RADIOLOGY

## 2023-10-02 PROCEDURE — 93922 UPR/L XTREMITY ART 2 LEVELS: CPT | Performed by: INTERNAL MEDICINE

## 2023-10-02 PROCEDURE — 93922 UPR/L XTREMITY ART 2 LEVELS: CPT

## 2023-10-02 PROCEDURE — 71271 CT THORAX LUNG CANCER SCR C-: CPT

## 2023-10-03 ENCOUNTER — APPOINTMENT (OUTPATIENT)
Dept: PHYSICAL THERAPY | Facility: HOSPITAL | Age: 63
End: 2023-10-03
Payer: COMMERCIAL

## 2023-10-03 ENCOUNTER — DOCUMENTATION (OUTPATIENT)
Dept: PHYSICAL THERAPY | Facility: HOSPITAL | Age: 63
End: 2023-10-03
Payer: COMMERCIAL

## 2023-10-03 NOTE — PROGRESS NOTES
Physical Therapy                 Therapy Communication Note    Patient Name: Isael Haji  MRN: [id number]  Today's Date: 10/3/2023     Discipline: Physical Therapy    Missed Visit Reason:  no reason    Missed Time: Cancel    Comment:

## 2023-10-09 ENCOUNTER — OFFICE VISIT (OUTPATIENT)
Dept: PAIN MEDICINE | Facility: HOSPITAL | Age: 63
End: 2023-10-09
Payer: COMMERCIAL

## 2023-10-09 VITALS
WEIGHT: 144.9 LBS | DIASTOLIC BLOOD PRESSURE: 75 MMHG | HEART RATE: 59 BPM | HEIGHT: 65 IN | RESPIRATION RATE: 16 BRPM | SYSTOLIC BLOOD PRESSURE: 111 MMHG | BODY MASS INDEX: 24.14 KG/M2

## 2023-10-09 DIAGNOSIS — M48.07 SPINAL STENOSIS OF LUMBOSACRAL REGION: Primary | ICD-10-CM

## 2023-10-09 PROCEDURE — 1036F TOBACCO NON-USER: CPT | Performed by: CLINICAL NURSE SPECIALIST

## 2023-10-09 PROCEDURE — 99214 OFFICE O/P EST MOD 30 MIN: CPT | Performed by: CLINICAL NURSE SPECIALIST

## 2023-10-09 PROCEDURE — 3078F DIAST BP <80 MM HG: CPT | Performed by: CLINICAL NURSE SPECIALIST

## 2023-10-09 PROCEDURE — 3074F SYST BP LT 130 MM HG: CPT | Performed by: CLINICAL NURSE SPECIALIST

## 2023-10-09 ASSESSMENT — PATIENT HEALTH QUESTIONNAIRE - PHQ9
5. POOR APPETITE OR OVEREATING: NOT AT ALL
2. FEELING DOWN, DEPRESSED OR HOPELESS: SEVERAL DAYS
SUM OF ALL RESPONSES TO PHQ QUESTIONS 1-9: 4
7. TROUBLE CONCENTRATING ON THINGS, SUCH AS READING THE NEWSPAPER OR WATCHING TELEVISION: NOT AT ALL
9. THOUGHTS THAT YOU WOULD BE BETTER OFF DEAD, OR OF HURTING YOURSELF: NOT AT ALL
10. IF YOU CHECKED OFF ANY PROBLEMS, HOW DIFFICULT HAVE THESE PROBLEMS MADE IT FOR YOU TO DO YOUR WORK, TAKE CARE OF THINGS AT HOME, OR GET ALONG WITH OTHER PEOPLE: SOMEWHAT DIFFICULT
SUM OF ALL RESPONSES TO PHQ9 QUESTIONS 1 AND 2: 3
1. LITTLE INTEREST OR PLEASURE IN DOING THINGS: MORE THAN HALF THE DAYS
3. TROUBLE FALLING OR STAYING ASLEEP OR SLEEPING TOO MUCH: NOT AT ALL
6. FEELING BAD ABOUT YOURSELF - OR THAT YOU ARE A FAILURE OR HAVE LET YOURSELF OR YOUR FAMILY DOWN: NOT AT ALL
4. FEELING TIRED OR HAVING LITTLE ENERGY: SEVERAL DAYS
8. MOVING OR SPEAKING SO SLOWLY THAT OTHER PEOPLE COULD HAVE NOTICED. OR THE OPPOSITE, BEING SO FIGETY OR RESTLESS THAT YOU HAVE BEEN MOVING AROUND A LOT MORE THAN USUAL: NOT AT ALL

## 2023-10-09 ASSESSMENT — ENCOUNTER SYMPTOMS
MYALGIAS: 1
ARTHRALGIAS: 1
DEPRESSION: 0
WEAKNESS: 1
LOSS OF SENSATION IN FEET: 1
OCCASIONAL FEELINGS OF UNSTEADINESS: 1
BACK PAIN: 1

## 2023-10-09 ASSESSMENT — COLUMBIA-SUICIDE SEVERITY RATING SCALE - C-SSRS
6. HAVE YOU EVER DONE ANYTHING, STARTED TO DO ANYTHING, OR PREPARED TO DO ANYTHING TO END YOUR LIFE?: NO
2. HAVE YOU ACTUALLY HAD ANY THOUGHTS OF KILLING YOURSELF?: NO
1. IN THE PAST MONTH, HAVE YOU WISHED YOU WERE DEAD OR WISHED YOU COULD GO TO SLEEP AND NOT WAKE UP?: NO

## 2023-10-09 NOTE — PROGRESS NOTES
Subjective   Patient ID: Isael Haji is a 63 y.o. male who presents for lumbar post-laminectomy syndrome.    HPI  62-year-old male with a history of coronary artery disease, COPD, CVA, hyperlipidemia, elevated LFTs/chronic alcoholism, spinal stenosis status post recent laminectomy discectomy and ulnar neuropathy. Patient is status post laminectomy/discectomy November 2022.  Patient presents at today's office visit with chronic low back pain radiating into bilateral hips and occasionally bilateral thighs.  Pain is described as burning and aching.  Patient denies numbness and tingling lower extremities.  He endorses intermittent weakness to bilateral lower extremities.  He denies any changes in bowel/bladder function.  Patient states that sitting for long periods of time and standing increases pain.  Currently managing his pain with an increased dose of gabapentin to 600 mg at bedtime which she feels has been helpful.  Rotated patient from tizanidine to methocarbamol at his last office visit for muscle tightness/spasm.  He admits to not routinely using the methocarbamol, however, continues to have muscle spasms which are worse at bedtime.  Patient started a formal course of aqua therapy which she feels has been helpful.  He has not followed up with Ortho/spine surgeon for persistent lumbar radicular pain status post recent laminectomy/fusion.  His surgeon recently retired and he plans to follow-up with a new Ortho/spine surgeon.    Location of Pain: pt is here for interval follow up. Pt has low back pain and also burning pain at site of surgery. Intermittent pain in bilateral hips.         Pain Score:  5/10    Other pain medication/neuromodulator: Gabapentin    Therapeutic Goals: I would like to sleep better    Therapeutic Assessment: I am up most of the night and I cramp up    Injections and/or Procedures: denies    Other: Water therapy currently- seems to be helping    OARRS:  No data recorded  I have personally  reviewed the OARRS report for Isael Samsger. I have considered the risks of abuse, dependence, addiction and diversion    Is the patient prescribed a combination of a benzodiazepine and opioid?  No    Last Urine Drug Screen / ordered today: No  Recent Results (from the past 8760 hour(s))   OPIATE/OPIOID/BENZO PRESCRIPTION COMPLIANCE    Collection Time: 11/09/22  9:10 AM   Result Value Ref Range    DRUG SCREEN COMMENT URINE SEE BELOW     Creatine, Urine 91.4 mg/dL    Amphetamine Screen, Urine PRESUMPTIVE NEGATIVE NEGATIVE    Barbiturate Screen, Urine PRESUMPTIVE NEGATIVE NEGATIVE    Cannabinoid Screen, Urine PRESUMPTIVE NEGATIVE NEGATIVE    Cocaine Screen, Urine PRESUMPTIVE NEGATIVE NEGATIVE    PCP Screen, Urine PRESUMPTIVE NEGATIVE NEGATIVE    7-Aminoclonazepam <25 Cutoff <25 ng/mL    Alpha-Hydroxyalprazolam <25 Cutoff <25 ng/mL    Alpha-Hydroxymidazolam <25 Cutoff <25 ng/mL    Alprazolam <25 Cutoff <25 ng/mL    Chlordiazepoxide <25 Cutoff <25 ng/mL    Clonazepam <25 Cutoff <25 ng/mL    Diazepam <25 Cutoff <25 ng/mL    Lorazepam <25 Cutoff <25 ng/mL    Midazolam <25 Cutoff <25 ng/mL    Nordiazepam <25 Cutoff <25 ng/mL    Oxazepam <25 Cutoff <25 ng/mL    Temazepam <25 Cutoff <25 ng/mL    Zolpidem <25 Cutoff <25 ng/mL    Zolpidem Metabolite (ZCA) <25 Cutoff <25 ng/mL    6-Acetylmorphine <25 Cutoff <25 ng/mL    Codeine <50 Cutoff <50 ng/mL    Hydrocodone <25 Cutoff <25 ng/mL    Hydromorphone <25 Cutoff <25 ng/mL    Morphine Urine <50 Cutoff <50 ng/mL    Norhydrocodone <25 Cutoff <25 ng/mL    Noroxycodone <25 Cutoff <25 ng/mL    Oxycodone <25 Cutoff <25 ng/mL    Oxymorphone <25 Cutoff <25 ng/mL    Tramadol <50 Cutoff <50 ng/mL    O-Desmethyltramadol <50 Cutoff <50 ng/mL    Fentanyl <2.5 Cutoff<2.5 ng/mL    Norfentanyl <2.5 Cutoff<2.5 ng/mL    METHADONE CONFIRMATION,URINE <25 Cutoff <25 ng/mL    EDDP <25 Cutoff <25 ng/mL   Tapentadol Confirmation, Urine    Collection Time: 11/09/22  9:10 AM   Result Value Ref Range     Tapentadol <25 Cutoff <25 ng/mL    N-Desmethyltapentadol <25 Cutoff <25 ng/mL   Buprenorphine Confirm,Urine    Collection Time: 11/09/22  9:10 AM   Result Value Ref Range    BUPRENORPHINE GLUC, URINE <5 ng/mL    BUPRENORPHINE ,URINE <2 ng/mL    NALOXONE, URINE <100 ng/mL    NORBUPRENORPHINE GLUC,URINE <5 ng/mL    NORBUPRENORPHINE, URINE <2 ng/mL     N/A    Controlled Substance Agreement:  Date of the Last Agreement: YESSICA      Monitoring and compliance:    ORT: YESSICA    PDUQ: NA    Office Agreement: 8/8/23      Review of Systems   Musculoskeletal:  Positive for arthralgias, back pain and myalgias.   Neurological:  Positive for weakness.   All other systems reviewed and are negative.         Current Outpatient Medications:     albuterol 90 mcg/actuation inhaler, Inhale 2 puffs every 4 hours if needed., Disp: , Rfl:     amLODIPine (Norvasc) 5 mg tablet, Take 1 tablet (5 mg) by mouth once daily., Disp: 90 tablet, Rfl: 3    aspirin 81 mg chewable tablet, Chew 1 tablet (81 mg) once daily. Chew and swallow., Disp: , Rfl:     atorvastatin (Lipitor) 80 mg tablet, Take 1 tablet (80 mg) by mouth once daily., Disp: 90 tablet, Rfl: 3    carvedilol (Coreg) 6.25 mg tablet, Take 1 tablet (6.25 mg) by mouth 2 times a day with meals., Disp: , Rfl:     gabapentin (Neurontin) 600 mg tablet, Take 1 tablet (600 mg) by mouth once daily., Disp: 90 tablet, Rfl: 3    lisinopril 40 mg tablet, Take 1 tablet (40 mg) by mouth once daily., Disp: 90 tablet, Rfl: 3    miscellaneous medical supply (Blood Pressure Cuff) misc, 1 Units once daily., Disp: 1 each, Rfl: 0    naloxone (Narcan) 4 mg/0.1 mL nasal spray, Administer 1 spray (4 mg) into affected nostril(s) if needed for opioid reversal or respiratory depression. May repeat every 2-3 minutes if needed, alternating nostrils, until medical assistance becomes available., Disp: , Rfl:     sildenafil (Viagra) 100 mg tablet, Take 1 tablet (100 mg) by mouth 2 times a week. As needed, Disp: 10 tablet, Rfl:  3    umeclidinium-vilanteroL (Anoro Ellipta) 62.5-25 mcg/actuation blister with device, Inhale 1 puff once daily., Disp: , Rfl:      Past Medical History:   Diagnosis Date    Long term (current) use of opiate analgesic 05/04/2022    Long term (current) use of opiate analgesic    Other bursal cyst, unspecified elbow 04/29/2022    Synovial cyst of elbow    Personal history of other diseases of the circulatory system 12/09/2020    History of hypertension        Past Surgical History:   Procedure Laterality Date    MR HEAD ANGIO WO IV CONTRAST  11/29/2020    MR HEAD ANGIO WO IV CONTRAST 11[facility]29/2020 [facility]    MR NECK ANGIO WO IV CONTRAST  11/29/2020    MR NECK ANGIO WO IV CONTRAST 11/29/2020 [facility]    OTHER SURGICAL HISTORY  12/09/2020    Rotator cuff repair    OTHER SURGICAL HISTORY  12/09/2020    Cardiac catheterization with stent placement    OTHER SURGICAL HISTORY  12/29/2022    Lower back surgery    OTHER SURGICAL HISTORY  08/31/2021    Arm surgery        Family History   Problem Relation Name Age of Onset    Diabetes Mother      Lung cancer Mother      Coronary artery disease Father          Allergies   Allergen Reactions    Codeine Unknown     Gastrointestinal upset         Objective     Visit Vitals  Smoking Status Former        Physical Exam  Vitals and nursing note reviewed.   Constitutional:       Appearance: Normal appearance.   Musculoskeletal:         General: Tenderness present.   Neurological:      Mental Status: He is alert.      Motor: Weakness present.             Physical exam as above except: Tenderness to paraspinous musculature lower lumbar spine.  Flexion intact with extension exacerbating pain.  Slight forward leaning gait.        Assessment/Plan   Problem List Items Addressed This Visit             ICD-10-CM    Spinal stenosis of lumbosacral region - Primary M48.07     63-year-old male with history of lumbar discectomy in November 2022.  Presents for symptoms  consistent with lumbar postlaminectomy syndrome.  Previously on opiates which were discontinued.  Patient would like to manage pain without opiate medication.  Tolerating increased dose of gabapentin to 600 mg at bedtime.  Prescribed Robaxin for muscle tightness and spasms which patient admitted to not taking.  Started a formal course of aqua therapy.  Reviewed lumbar x-ray consistent with mild multilevel spondylosis.  Recommended patient proceed with reevaluation by Ortho/spine surgeon if symptoms worsen and aqua therapy and above-noted medications are not helpful.  Plan discussed with patient at today's office visit.    Continue gabapentin 600 mg at bedtime.  The patient was counseled on the risks and potential side effects of gabapentin as well as its importance for dosage titration. Side effects included but were not limited to, drowsiness, sedation, cognitive decline, peripheral edema, weight gain, seizures, with abrupt withdrawal.  Patient was instructed to call the office with any concerns or side effects before abruptly stopping medication.  Reviewed lumbar x-ray consistent with mild multilevel spondylosis.  -Continue methocarbamol as needed for muscle tightness/spasm.  Advised patient to try to take methocarbamol at bedtime as this is when muscle tightness and spasm is most intense.  Reviewed potential side effects with patient.  Patient will continue aqua therapy at this time targeting lumbar pain.  -Patient would like to continue aqua therapy prior to reevaluation by Ortho/spine surgeon.  If he fails to receive relief with aqua therapy and the above-noted medications he will proceed with reevaluation by an orthospine surgeon.

## 2023-10-09 NOTE — ASSESSMENT & PLAN NOTE
63-year-old male with history of lumbar discectomy in November 2022.  Presents for symptoms consistent with lumbar postlaminectomy syndrome.  Previously on opiates which were discontinued.  Patient would like to manage pain without opiate medication.  Tolerating increased dose of gabapentin to 600 mg at bedtime.  Prescribed Robaxin for muscle tightness and spasms which patient admitted to not taking.  Started a formal course of aqua therapy.  Reviewed lumbar x-ray consistent with mild multilevel spondylosis.  Recommended patient proceed with reevaluation by Ortho/spine surgeon if symptoms worsen and aqua therapy and above-noted medications are not helpful.  Plan discussed with patient at today's office visit.    Continue gabapentin 600 mg at bedtime.  The patient was counseled on the risks and potential side effects of gabapentin as well as its importance for dosage titration. Side effects included but were not limited to, drowsiness, sedation, cognitive decline, peripheral edema, weight gain, seizures, with abrupt withdrawal.  Patient was instructed to call the office with any concerns or side effects before abruptly stopping medication.  Reviewed lumbar x-ray consistent with mild multilevel spondylosis.  -Continue methocarbamol as needed for muscle tightness/spasm.  Advised patient to try to take methocarbamol at bedtime as this is when muscle tightness and spasm is most intense.  Reviewed potential side effects with patient.  Patient will continue aqua therapy at this time targeting lumbar pain.  -Patient would like to continue aqua therapy prior to reevaluation by Ortho/spine surgeon.  If he fails to receive relief with aqua therapy and the above-noted medications he will proceed with reevaluation by an orthospine surgeon.   When Should The Patient Follow-Up For Their Next Full-Body Skin Exam?: 3 Months Detail Level: Generalized Quality 137: Melanoma: Continuity Of Care - Recall System: Patient information entered into a recall system that includes: target date for the next exam specified AND a process to follow up with patients regarding missed or unscheduled appointments Detail Level: Zone

## 2023-10-11 ENCOUNTER — TREATMENT (OUTPATIENT)
Dept: PHYSICAL THERAPY | Facility: HOSPITAL | Age: 63
End: 2023-10-11
Payer: COMMERCIAL

## 2023-10-11 DIAGNOSIS — R25.2 MUSCLE CRAMPING: ICD-10-CM

## 2023-10-11 DIAGNOSIS — R29.898 LEG WEAKNESS, BILATERAL: ICD-10-CM

## 2023-10-11 DIAGNOSIS — R26.81 GAIT INSTABILITY: Primary | ICD-10-CM

## 2023-10-11 PROCEDURE — 97113 AQUATIC THERAPY/EXERCISES: CPT | Mod: GP,CQ

## 2023-10-11 ASSESSMENT — PAIN DESCRIPTION - DESCRIPTORS: DESCRIPTORS: ACHING;DULL;SPASM

## 2023-10-11 ASSESSMENT — PAIN SCALES - GENERAL: PAINLEVEL_OUTOF10: 6

## 2023-10-11 ASSESSMENT — PAIN - FUNCTIONAL ASSESSMENT: PAIN_FUNCTIONAL_ASSESSMENT: 0-10

## 2023-10-11 NOTE — PROGRESS NOTES
"Physical Therapy    Physical Therapy Treatment    Patient Name: Isael Haji  MRN: [id number]  Today's Date: 10/11/2023  Time Calculation  Start Time: 0750  Stop Time: 0835  Time Calculation (min): 45 minVisit #5      Assessment:  PT Assessment  Evaluation/Treatment Tolerance: Patient tolerated treatment well  Assessment Comment: improved posture with exs today and pace, pain decreased and tightness  after deep water exs 4/10    Plan:  OP PT Plan  PT Plan:  (increase reps add tray exs next visit if carlos)    Current Problem  1. Gait instability  PT eval and treat      2. Muscle cramping        3. Leg weakness, bilateral            Subjective  pt reports pain staying in LB not as much spasms in LEs           Pain  Pain Assessment: 0-10  Pain Score: 6  Pain Type: Chronic pain  Pain Location: Back  Pain Orientation: Lower  Pain Descriptors: Aching, Dull, Spasm  Pain Frequency: Intermittent    Objective  re-initiated aquatics added deep water kicks        Treatments:   Aquatic Exercise  Aquatic Exercise Performed: Yes  Aquatic Exercise Activity 1: Amb Forw/Retro/Lat X 2 ea  Aquatic Exercise Activity 2: Marching x 2 laps  Aquatic Exercise Activity 3: TR/HR X 15 EA  Aquatic Exercise Activity 4: Squats X 15  Aquatic Exercise Activity 5: SLR X 3- 15 EA  Aquatic Exercise Activity 6: Step ups X 15 EA  Aquatic Exercise Activity 7: gastroc and HS stretche 3 X 15\" EA  Aquatic Exercise Activity 8: Deep water Biking and Distraction 5' and 5'  Aquatic Exercise Activity 9: Deep water kicks JJ and X-country - 20 EA      Goals:  Encounter Problems       Encounter Problems (Active)       PT Problem       PT Goal 1       Start:  10/11/23    Expected End:  12/11/23       Goals: Goals set and discussed today.     Pt's LE cramping will be eliminated, by week 8   Gait/Locomotion: Pt. will present with normalized gait pattern for improved gait safety  , by week 8   Strength: Pt's eddie LE strength will improve to 4+/5 throughout to allow for " improved gait safety  , by week 6

## 2023-10-17 ENCOUNTER — TREATMENT (OUTPATIENT)
Dept: PHYSICAL THERAPY | Facility: HOSPITAL | Age: 63
End: 2023-10-17
Payer: COMMERCIAL

## 2023-10-17 DIAGNOSIS — R29.898 LEG WEAKNESS, BILATERAL: Primary | ICD-10-CM

## 2023-10-17 DIAGNOSIS — R26.81 GAIT INSTABILITY: ICD-10-CM

## 2023-10-17 DIAGNOSIS — R25.2 MUSCLE CRAMPING: ICD-10-CM

## 2023-10-17 PROCEDURE — 97113 AQUATIC THERAPY/EXERCISES: CPT | Mod: GP,CQ

## 2023-10-17 ASSESSMENT — PAIN - FUNCTIONAL ASSESSMENT: PAIN_FUNCTIONAL_ASSESSMENT: 0-10

## 2023-10-17 ASSESSMENT — PAIN DESCRIPTION - DESCRIPTORS: DESCRIPTORS: ACHING;SHARP

## 2023-10-17 ASSESSMENT — PAIN SCALES - GENERAL: PAINLEVEL_OUTOF10: 7

## 2023-10-17 NOTE — PROGRESS NOTES
"Physical Therapy    Physical Therapy Treatment    Patient Name: Isael Haji  MRN: [id number]  Today's Date: 10/17/2023  Time Calculation  Start Time: 0800  Stop Time: 0845  Time Calculation (min): 45 min  VISIT 6      Assessment:  PT Assessment  Evaluation/Treatment Tolerance: Patient tolerated treatment well  Assessment Comment: increased cramping in L LE with HS stretches today, improved carlos of deep water kicks, pain after treatment 5/10    Plan:  OP PT Plan  PT Plan:  (increase pace as able for resistance)    Current Problem  1. Leg weakness, bilateral        2. Gait instability  PT eval and treat      3. Muscle cramping            Subjective  pt reports pain in center of LB today       Precautions        Pain  Pain Assessment: 0-10  Pain Score: 7  Pain Type: Chronic pain  Pain Location: Back  Pain Orientation: Lower  Pain Descriptors: Aching, Sharp  Pain Frequency: Intermittent    Objective added tray exs and increased reps        Treatments:     Aquatic Exercise  Aquatic Exercise Performed: Yes  Aquatic Exercise Activity 1: Amb Forw/Retro/Lat X 2 ea  Aquatic Exercise Activity 2: Marching x 2 laps  Aquatic Exercise Activity 3: TR/HR X 20 EA  Aquatic Exercise Activity 4: Squats X 15  Aquatic Exercise Activity 5: SLR X 3- 20 EA  Aquatic Exercise Activity 6: Step ups X 15 EA  Aquatic Exercise Activity 7: gastroc and HS stretche 3 X 15\" EA  Aquatic Exercise Activity 8: Deep water Biking and Distraction 5' and 5'  Aquatic Exercise Activity 9: Deep water kicks JJ and X-country - 2 X 20 EA  Aquatic Exercise Activity 10: Tray exs PUSH/PULL, UP/DOWN X 15 EA - T1      Goals:  Active       PT Problem       PT Goal 1       Start:  10/11/23    Expected End:  12/11/23       Goals: Goals set and discussed today.     Pt's LE cramping will be eliminated, by week 8   Gait/Locomotion: Pt. will present with normalized gait pattern for improved gait safety  , by week 8   Strength: Pt's edide LE strength will improve to 4+/5 " throughout to allow for improved gait safety  , by week 6

## 2023-10-19 ENCOUNTER — TREATMENT (OUTPATIENT)
Dept: PHYSICAL THERAPY | Facility: HOSPITAL | Age: 63
End: 2023-10-19
Payer: COMMERCIAL

## 2023-10-19 DIAGNOSIS — R25.2 MUSCLE CRAMPING: ICD-10-CM

## 2023-10-19 DIAGNOSIS — R26.81 GAIT INSTABILITY: ICD-10-CM

## 2023-10-19 DIAGNOSIS — R26.81 GAIT INSTABILITY: Primary | ICD-10-CM

## 2023-10-19 PROCEDURE — 97113 AQUATIC THERAPY/EXERCISES: CPT | Mod: GP,CQ

## 2023-10-19 PROCEDURE — 97110 THERAPEUTIC EXERCISES: CPT | Mod: GP,59 | Performed by: PHYSICAL THERAPIST

## 2023-10-19 ASSESSMENT — PAIN SCALES - GENERAL
PAINLEVEL_OUTOF10: 5 - MODERATE PAIN
PAINLEVEL_OUTOF10: 5 - MODERATE PAIN

## 2023-10-19 ASSESSMENT — PAIN - FUNCTIONAL ASSESSMENT
PAIN_FUNCTIONAL_ASSESSMENT: 0-10
PAIN_FUNCTIONAL_ASSESSMENT: 0-10

## 2023-10-19 ASSESSMENT — PAIN DESCRIPTION - DESCRIPTORS: DESCRIPTORS: ACHING

## 2023-10-19 NOTE — PROGRESS NOTES
Physical Therapy    Physical Therapy Treatment    Patient Name: Isael Haji  MRN: [id number]  Today's Date: 10/19/2023  Time Calculation  Start Time: 0900  Stop Time: 0930  Time Calculation (min): 30 min      Assessment:   No increase in pain with land based DLS added today    Strength, gait and spasms have improve  Plan:   Continue with aquatic and add land based DLS    Current Problem  1. Gait instability  PT eval and treat    Follow Up In Physical Therapy      2. Muscle cramping  Follow Up In Physical Therapy          Subjective   General   Pt reports 45% improvement in spasms since beginning PT  Precautions  Precautions  Precautions Comment: none       Pain  Pain Assessment: 0-10  Pain Score: 5 - Moderate pain  Pain Location: Back5/10 lumbar pain today    Objective   4+/5 eddie LE strength throughout  Gait pattern improved but continues to show decreased trunk rotation         EXERCISES Date Date Date Date   VISIT# # 7 # # #   Reassessment   X             Nustep  L2 5 min             Shuttle   DLP  4B 2x10      Shuttle   SLP       eddie  2B x10      Shuttle   TR/HR                     Tband   Lat Pulls       Tband   Chops       Tband   Mid Row       Tband   Mower starts              SB  DKTC       SB   LTR                     3 way LE raise                                                        Stretches:            Hamstrings eddie             Tiltboard calf                           Goals:  Active       PT Problem       PT Goal 1       Start:  10/11/23    Expected End:  12/11/23       Goals: Goals set and discussed today.     Pt's LE cramping will be eliminated, by week 8   Gait/Locomotion: Pt. will present with normalized gait pattern for improved gait safety  , by week 8   Strength: Pt's eddie LE strength will improve to 4+/5 throughout to allow for improved gait safety  , by week 6

## 2023-10-19 NOTE — PROGRESS NOTES
"Physical Therapy    Physical Therapy Treatment    Patient Name: Isael Haji  MRN: [id number]  Today's Date: 10/19/2023  Time Calculation  Start Time: 0800  Stop Time: 0847  Time Calculation (min): 47 min  VISIT 7      Assessment:  PT Assessment  Evaluation/Treatment Tolerance: Patient tolerated treatment well  Assessment Comment: decreased tightness with treatment today, pt with improved posture and postural awraeness with all exs, pain after distraction 3-4/10    Plan:  OP PT Plan  PT Plan:  (reassess)    Current Problem  1. Gait instability  PT eval and treat          Subjective  pt reports pain in LB not as sharp today and new exs felt good after last visit       Precautions  Precautions  Precautions Comment: no changes     Pain  Pain Assessment: 0-10  Pain Score: 5 - Moderate pain  Pain Type: Chronic pain  Pain Location: Back  Pain Orientation: Lower  Pain Descriptors: Aching  Pain Frequency: Intermittent    Objective increased reps with tray exs, squats and step ups        Treatments:     Aquatic Exercise  Aquatic Exercise Performed: Yes  Aquatic Exercise Activity 1: Amb Forw/Retro/Lat X 2 ea  Aquatic Exercise Activity 2: Marching x 2 laps  Aquatic Exercise Activity 3: TR/HR X 20 EA  Aquatic Exercise Activity 4: Squats X 20  Aquatic Exercise Activity 5: SLR X 3- 20 EA  Aquatic Exercise Activity 6: Step ups X 20 EA  Aquatic Exercise Activity 7: gastroc and HS stretche 3 X 15\" EA  Aquatic Exercise Activity 8: Deep water Biking and Distraction 5' and 5'  Aquatic Exercise Activity 9: Deep water kicks JJ and X-country - 2 X 20 EA  Aquatic Exercise Activity 10: Tray exs PUSH/PULL, UP/DOWN X 20 EA - T1      Goals:  Active       PT Problem       PT Goal 1       Start:  10/11/23    Expected End:  12/11/23       Goals: Goals set and discussed today.     Pt's LE cramping will be eliminated, by week 8   Gait/Locomotion: Pt. will present with normalized gait pattern for improved gait safety  , by week 8   Strength: Pt's " eddie LE strength will improve to 4+/5 throughout to allow for improved gait safety  , by week 6

## 2023-10-24 ENCOUNTER — HOSPITAL ENCOUNTER (OUTPATIENT)
Dept: RESPIRATORY THERAPY | Facility: HOSPITAL | Age: 63
Discharge: HOME | End: 2023-10-24
Payer: COMMERCIAL

## 2023-10-24 DIAGNOSIS — J44.9 CHRONIC OBSTRUCTIVE PULMONARY DISEASE, UNSPECIFIED COPD TYPE (MULTI): ICD-10-CM

## 2023-10-24 ASSESSMENT — PAIN SCALES - GENERAL: PAINLEVEL_OUTOF10: 0 - NO PAIN

## 2023-10-24 ASSESSMENT — PAIN - FUNCTIONAL ASSESSMENT: PAIN_FUNCTIONAL_ASSESSMENT: 0-10

## 2023-10-25 ENCOUNTER — TREATMENT (OUTPATIENT)
Dept: PHYSICAL THERAPY | Facility: HOSPITAL | Age: 63
End: 2023-10-25
Payer: COMMERCIAL

## 2023-10-25 DIAGNOSIS — R25.2 MUSCLE CRAMPING: ICD-10-CM

## 2023-10-25 DIAGNOSIS — R29.898 LEG WEAKNESS, BILATERAL: ICD-10-CM

## 2023-10-25 DIAGNOSIS — R26.81 GAIT INSTABILITY: ICD-10-CM

## 2023-10-25 DIAGNOSIS — R26.81 GAIT INSTABILITY: Primary | ICD-10-CM

## 2023-10-25 LAB
MGC ASCENT PFT - FEV1 - PRE: 3.07
MGC ASCENT PFT - FEV1 - PREDICTED: 2.79
MGC ASCENT PFT - FVC - PRE: 4.59
MGC ASCENT PFT - FVC - PREDICTED: 3.53

## 2023-10-25 PROCEDURE — 97110 THERAPEUTIC EXERCISES: CPT | Mod: GP | Performed by: PHYSICAL THERAPIST

## 2023-10-25 PROCEDURE — 97113 AQUATIC THERAPY/EXERCISES: CPT | Mod: GP,CQ

## 2023-10-25 ASSESSMENT — PAIN - FUNCTIONAL ASSESSMENT
PAIN_FUNCTIONAL_ASSESSMENT: 0-10
PAIN_FUNCTIONAL_ASSESSMENT: 0-10

## 2023-10-25 ASSESSMENT — PAIN SCALES - GENERAL
PAINLEVEL_OUTOF10: 8
PAINLEVEL_OUTOF10: 0 - NO PAIN

## 2023-10-25 ASSESSMENT — PAIN DESCRIPTION - DESCRIPTORS: DESCRIPTORS: SHARP

## 2023-10-25 NOTE — PROGRESS NOTES
Physical Therapy    Physical Therapy Treatment    Patient Name: Isael Haji  MRN: [id number]  Today's Date: 10/25/2023  Time Calculation  Start Time: 1415  Stop Time: 1449  Time Calculation (min): 34 min      Assessment:   Pt able to progress exercise today    Plan:   Gradually progress DLS     Current Problem  1. Gait instability        2. Muscle cramping        3. Leg weakness, bilateral            Subjective   Pt. Reports that he has 8/10 pain today.     Precautions  Precautions  Precautions Comment: none  Vital Signs     Pain  Pain Assessment: 0-10  Pain Score: 0 - No pain    Objective   Antalgic gait persists     Treatments:  EXERCISES Date 10/19/23 Date 10/25/23 Date Date   VISIT# # 7 # 8 # #   Reassessment   X             Nustep  L2 5 min  L2  5 min            Shuttle   DLP  4B 2x10  4B 2x10     Shuttle   SLP       eddie  2B x10  2B 2x10     Shuttle   TR/HR                     Tband   Lat Pulls       Tband   Chops       Tband   Mid Row       Tband   Mower starts              SB  DKTC       SB   LTR                     3 way LE raise   X10 eddie                                                      Stretches:            Hamstrings eddie             Tiltboard calf                     Goals:  Active       PT Problem       PT Goal 1       Start:  10/11/23    Expected End:  12/11/23       Goals: Goals set and discussed today.     Pt's LE cramping will be eliminated, by week 8   Gait/Locomotion: Pt. will present with normalized gait pattern for improved gait safety  , by week 8   Strength: Pt's eddie LE strength will improve to 4+/5 throughout to allow for improved gait safety  , by week 6

## 2023-10-25 NOTE — PROGRESS NOTES
"Physical Therapy    Physical Therapy Treatment    Patient Name: Isael Haji  MRN: [id number]  Today's Date: 10/25/2023  Time Calculation  Start Time: 1500  Stop Time: 1545  Time Calculation (min): 45 min  VISIT 8      Assessment:  PT Assessment  Evaluation/Treatment Tolerance: Patient tolerated treatment well  Assessment Comment: tightness in LB and Lester Hips decrease after treatment , improved posture with treatment today no change in pain level    Plan:  OP PT Plan  PT Plan:  (add wand exs next if able)    Current Problem  1. Gait instability  Follow Up In Physical Therapy      2. Muscle cramping  Follow Up In Physical Therapy          Subjective  pt reports no increased pain from land therapy today 8/10       Precautions  Precautions  Precautions Comment: none     Pain  Pain Assessment: 0-10  Pain Score: 8  Pain Type: Chronic pain  Pain Location: Back  Pain Orientation: Lower  Pain Descriptors: Sharp    Objective  increased reps with deep water kicks        Treatments:     Aquatic Exercise  Aquatic Exercise Performed: Yes  Aquatic Exercise Activity 1: Amb Forw/Retro/Lat X 2 ea  Aquatic Exercise Activity 2: Marching x 2 laps  Aquatic Exercise Activity 3: TR/HR X 20 EA  Aquatic Exercise Activity 4: Squats X 20  Aquatic Exercise Activity 5: SLR X 3- 20 EA  Aquatic Exercise Activity 6: Step ups X 20 EA  Aquatic Exercise Activity 7: gastroc and HS stretche 3 X 15\" EA  Aquatic Exercise Activity 8: Deep water Biking and Distraction 5' and 5'  Aquatic Exercise Activity 9: Deep water kicks JJ and X-country - 2 X 20 EA  Aquatic Exercise Activity 10: Tray exs PUSH/PULL, UP/DOWN X 20 EA - T1      Goals:  Active       PT Problem       PT Goal 1       Start:  10/11/23    Expected End:  12/11/23       Goals: Goals set and discussed today.     Pt's LE cramping will be eliminated, by week 8   Gait/Locomotion: Pt. will present with normalized gait pattern for improved gait safety  , by week 8   Strength: Pt's lester LE strength will " improve to 4+/5 throughout to allow for improved gait safety  , by week 6

## 2023-11-01 ENCOUNTER — TREATMENT (OUTPATIENT)
Dept: PHYSICAL THERAPY | Facility: HOSPITAL | Age: 63
End: 2023-11-01
Payer: COMMERCIAL

## 2023-11-01 DIAGNOSIS — R26.81 GAIT INSTABILITY: ICD-10-CM

## 2023-11-01 DIAGNOSIS — R25.2 MUSCLE CRAMPING: ICD-10-CM

## 2023-11-01 DIAGNOSIS — R26.81 GAIT INSTABILITY: Primary | ICD-10-CM

## 2023-11-01 PROCEDURE — 97110 THERAPEUTIC EXERCISES: CPT | Mod: GP,CQ,59 | Performed by: PHYSICAL THERAPY ASSISTANT

## 2023-11-01 PROCEDURE — 97113 AQUATIC THERAPY/EXERCISES: CPT | Mod: GP,CQ | Performed by: PHYSICAL THERAPY ASSISTANT

## 2023-11-01 ASSESSMENT — PAIN DESCRIPTION - DESCRIPTORS
DESCRIPTORS: BURNING;ACHING
DESCRIPTORS: BURNING

## 2023-11-01 ASSESSMENT — PAIN SCALES - GENERAL
PAINLEVEL_OUTOF10: 6
PAINLEVEL_OUTOF10: 8

## 2023-11-01 ASSESSMENT — PAIN - FUNCTIONAL ASSESSMENT
PAIN_FUNCTIONAL_ASSESSMENT: 0-10
PAIN_FUNCTIONAL_ASSESSMENT: 0-10

## 2023-11-01 NOTE — PROGRESS NOTES
Physical Therapy    Physical Therapy Treatment    Patient Name: Isael Haji  MRN: [id number]  Today's Date: 11/1/2023  Time Calculation  Start Time: 0900  Stop Time: 0930  Time Calculation (min): 30 min      Assessment:   Pt able to progress reps and add Tband ex to program. Tolerated 30 min, then pain began to increase per pt.     Plan:   Gradually progress DLS     Current Problem  1. Gait instability  Follow Up In Physical Therapy      2. Muscle cramping  Follow Up In Physical Therapy          Subjective   Pt. Reports that he has 6/10 pain today. States he moves around a lot because he can't sit or stand to long.      Precautions  Precautions  Precautions Comment: none    Pain  Pain Assessment: 0-10  Pain Score: 6  Pain Type: Chronic pain  Pain Location: Back  Pain Orientation: Lower  Pain Descriptors: Burning    Objective   Antalgic gait persists     Treatments:  EXERCISES Date 10/19/23 Date 10/25/23 Date 11/1/23 Date   VISIT# # 7 # 8 #9 #   Reassessment   X             Nustep  L2 5 min  L2  5 min L2 x 10           Shuttle   DLP  4B 2x10  4B 2x10 4B 3 x 10    Shuttle   SLP       eddie  2B x10  2B 2x10 2B 2 x 10 R/L     Shuttle   TR/HR                     Tband   Lat Pulls   Red 2 x 10    Tband   Chops       Tband   Mid Row   Red 2 x 10    Tband   Mower starts              SB  DKTC       SB   LTR                     3 way LE raise   X10 eddie 2 x 10 R/L                                                     Stretches:            Hamstrings eddie    30 sec x 2 R/L          Tiltboard calf   30 sec x 3                  Goals:  Active       PT Problem       PT Goal 1       Start:  10/11/23    Expected End:  12/11/23       Goals: Goals set and discussed today.     Pt's LE cramping will be eliminated, by week 8   Gait/Locomotion: Pt. will present with normalized gait pattern for improved gait safety  , by week 8   Strength: Pt's eddie LE strength will improve to 4+/5 throughout to allow for improved gait safety  , by week 6

## 2023-11-01 NOTE — PROGRESS NOTES
"Physical Therapy    Physical Therapy Treatment    Patient Name: Isael Haji  MRN: [id number]  Today's Date: 11/1/2023  Time Calculation  Start Time: 0945  Stop Time: 1030  Time Calculation (min): 45 min  Visit #8    Assessment:  PT Assessment  Evaluation/Treatment Tolerance: Patient tolerated treatment well  Assessment Comment: pt demonstrates increased strength, decreased pain and improved posture at end of session He is demonstrating gains toward all goals and will benefit from further treatment,    Plan:  OP PT Plan  Treatment/Interventions: Aquatic therapy  PT Plan: Skilled PT (increase reps /initiate dumb bells with gait as able)    Current Problem  1. Gait instability  Follow Up In Physical Therapy      2. Muscle cramping  Follow Up In Physical Therapy          Subjective  Cornelius reported he is feeling stronger and his posture is better. He states he feels initially sore after treatment but then has relief.        Precautions  Precautions  Precautions Comment: none     Pain  Pain Assessment: 0-10  Pain Score: 8  Pain Type: Chronic pain  Pain Location: Back  Pain Orientation: Lower  Pain Descriptors: Burning, Aching  Pain Frequency: Constant/continuous    Objective initiated wand exercises with BDB this session.  Initiated quick SLR all planes.           Treatments:   Aquatic Exercise  Aquatic Exercise Performed: Yes  Aquatic Exercise Activity 1: Amb Forw/Retro/Lat X 2 ea  Aquatic Exercise Activity 2: Marching x 2 laps  Aquatic Exercise Activity 3: TR/HR X 20 EA  Aquatic Exercise Activity 4: Squats X 20  Aquatic Exercise Activity 5: SLR X 3- 20 EA quick kick  Aquatic Exercise Activity 6: Step ups X 20 EA  Aquatic Exercise Activity 7: gastroc and HS stretches 3 X 15\" EA  Aquatic Exercise Activity 8: Deep water Biking and Distraction 5' and 5'  Aquatic Exercise Activity 9: Deep water kicks JJ and X-country - 2 X 20 EA  Aquatic Exercise Activity 10: Tray exs PUSH/PULL, UP/DOWN X 20 EA - T1  Aquatic Exercise Activity " 11: wand exercises with dumb bells shoulder flexion, ab/adduction and horizontal abduction x 10 ea e    OP EDUCATION:   Education provided for posture and technique,     Goals:  Active       PT Problem       PT Goal 1       Start:  10/11/23    Expected End:  12/11/23       Goals: Goals set and discussed today.     Pt's LE cramping will be eliminated, by week 8   Gait/Locomotion: Pt. will present with normalized gait pattern for improved gait safety  , by week 8   Strength: Pt's eddie LE strength will improve to 4+/5 throughout to allow for improved gait safety  , by week 6

## 2023-11-03 ENCOUNTER — TREATMENT (OUTPATIENT)
Dept: PHYSICAL THERAPY | Facility: HOSPITAL | Age: 63
End: 2023-11-03
Payer: COMMERCIAL

## 2023-11-03 DIAGNOSIS — R29.898 LEG WEAKNESS, BILATERAL: Primary | ICD-10-CM

## 2023-11-03 DIAGNOSIS — R26.81 GAIT INSTABILITY: ICD-10-CM

## 2023-11-03 DIAGNOSIS — R25.2 MUSCLE CRAMPING: ICD-10-CM

## 2023-11-03 PROCEDURE — 97110 THERAPEUTIC EXERCISES: CPT | Mod: GP,CQ

## 2023-11-03 ASSESSMENT — PAIN - FUNCTIONAL ASSESSMENT: PAIN_FUNCTIONAL_ASSESSMENT: 0-10

## 2023-11-03 ASSESSMENT — PAIN SCALES - GENERAL: PAINLEVEL_OUTOF10: 2

## 2023-11-03 NOTE — PROGRESS NOTES
"Physical Therapy    Physical Therapy Treatment    Patient Name: Isale Haji  MRN: [id number]  Today's Date: 11/3/2023  Time Calculation  Start Time: 0901  Stop Time: 0945  Time Calculation (min): 44 min      Assessment:   Pt able to complete chops without increase in sx. Patient had some slight pain with SB DKTC, encouraged patient to not push through pain. Pain increased to 2 and a half/10    Plan:   Gradually progress DLS     Current Problem  1. Leg weakness, bilateral        2. Gait instability  Follow Up In Physical Therapy      3. Muscle cramping  Follow Up In Physical Therapy          Subjective   Patient reports pain isn't too bad today.      Precautions  Precautions  Precautions Comment: none    Pain  Pain Assessment: 0-10  Pain Score: 2  Pain Location: Back  Pain Orientation: Lower    Objective   Increased shuttle resistance; added chops and DKTC with SB    Treatments:  EXERCISES Date 10/19/23 Date 10/25/23 Date 11/1/23 Date   VISIT# # 7 # 8 #9 #10   Reassessment   X             Nustep  L2 5 min  L2  5 min L2 x 10 L3  x10'          Shuttle   DLP  4B 2x10  4B 2x10 4B 3 x 10 5B 3x10   Shuttle   SLP       eddie  2B x10  2B 2x10 2B 2 x 10 R/L  3B 2 x 10 R/L    Shuttle   TR/HR                     Tband   Lat Pulls   Red 2 x 10 Red 2x10     Tband   Chops    Red 2x10   Tband   Mid Row   Red 2 x 10 Red 2x10   Tband   Mower starts              SB  DKTC    10x 5\" H   SB   LTR                     3 way LE raise   X10 eddie 2 x 10 R/L 2x10 R/L                                                    Stretches:            Hamstrings eddie    30 sec x 2 R/L  30 sec x 2        Tiltboard calf   30 sec x 3                  Goals:  Active       PT Problem       PT Goal 1       Start:  10/11/23    Expected End:  12/11/23       Goals: Goals set and discussed today.     Pt's LE cramping will be eliminated, by week 8   Gait/Locomotion: Pt. will present with normalized gait pattern for improved gait safety  , by week 8   Strength: Pt's eddie " LE strength will improve to 4+/5 throughout to allow for improved gait safety  , by week 6

## 2023-11-06 ENCOUNTER — APPOINTMENT (OUTPATIENT)
Dept: PHYSICAL THERAPY | Facility: HOSPITAL | Age: 63
End: 2023-11-06
Payer: COMMERCIAL

## 2023-11-06 ENCOUNTER — DOCUMENTATION (OUTPATIENT)
Dept: PHYSICAL THERAPY | Facility: HOSPITAL | Age: 63
End: 2023-11-06
Payer: COMMERCIAL

## 2023-11-08 ENCOUNTER — TREATMENT (OUTPATIENT)
Dept: PHYSICAL THERAPY | Facility: HOSPITAL | Age: 63
End: 2023-11-08
Payer: COMMERCIAL

## 2023-11-08 DIAGNOSIS — R25.2 MUSCLE CRAMPING: ICD-10-CM

## 2023-11-08 DIAGNOSIS — R26.81 GAIT INSTABILITY: ICD-10-CM

## 2023-11-08 DIAGNOSIS — R29.898 LEG WEAKNESS, BILATERAL: Primary | ICD-10-CM

## 2023-11-08 PROCEDURE — 97113 AQUATIC THERAPY/EXERCISES: CPT | Mod: GP,CQ | Performed by: PHYSICAL THERAPY ASSISTANT

## 2023-11-08 PROCEDURE — 97110 THERAPEUTIC EXERCISES: CPT | Mod: 59,GP,CQ

## 2023-11-08 ASSESSMENT — PAIN - FUNCTIONAL ASSESSMENT
PAIN_FUNCTIONAL_ASSESSMENT: 0-10
PAIN_FUNCTIONAL_ASSESSMENT: 0-10

## 2023-11-08 ASSESSMENT — PAIN SCALES - GENERAL
PAINLEVEL_OUTOF10: 2
PAINLEVEL_OUTOF10: 2

## 2023-11-08 NOTE — PROGRESS NOTES
"Physical Therapy    Physical Therapy Treatment    Patient Name: Isael Haji  MRN: [id number]  Today's Date: 11/8/2023  Time Calculation  Start Time: 0845  Stop Time: 0930  Time Calculation (min): 45 min  Visit # 10    Assessment:  PT Assessment  Evaluation/Treatment Tolerance: Patient tolerated treatment well  Assessment Comment: pt demonstrated improved strength and posture. His pain was 2/10 at beginning and end of session which is a decrease from last session, pt tolerated use of dumbbells with gait and sqats and T2 tray with exercises without c/o .    Plan:  OP PT Plan  Treatment/Interventions: Aquatic therapy  PT Plan: Skilled PT (modify treatment as tolerated)    Current Problem  1. Gait instability  Follow Up In Physical Therapy      2. Muscle cramping  Follow Up In Physical Therapy          Subjective   Cornelius stated he is feeling better. He thinks therapy is helping.      Precautions  Precautions  Precautions Comment: none       Pain  Pain Assessment: 0-10  Pain Score: 2  Pain Type: Chronic pain  Pain Location: Back  Pain Orientation: Lower    Objective   Initiated dumb ells with ambulation, marches and squats. Used T2 tray with push pull and in/out exercises. Educated provided for technique with dumb bells and good posture.            Treatments:  Aquatic Exercise  Aquatic Exercise Performed: Yes  Aquatic Exercise Activity 1: Amb Forw/Retro/Lat X 2 ea with BDB  Aquatic Exercise Activity 2: Marching x 2 laps with BDB  Aquatic Exercise Activity 3: TR/HR X 20 EA  Aquatic Exercise Activity 4: Squats X 20 with BDB  Aquatic Exercise Activity 5: SLR X 3- 20 EA quick kick  Aquatic Exercise Activity 6: Step ups X 20 EA  Aquatic Exercise Activity 7: gastroc and HS stretches 3 X 15\" EA  Aquatic Exercise Activity 8: Deep water Biking and Distraction 5' and 5'  Aquatic Exercise Activity 9: Deep water kicks JJ and X-country - 2 X 20 EA  Aquatic Exercise Activity 10: Tray exs PUSH/PULL, UP/DOWN X 20 EA - T2  Aquatic " Exercise Activity 11: wand exercises with dumb bells shoulder flexion, ab/adduction and horizontal abduction x10 ea           Goals:  Active       PT Problem       PT Goal 1       Start:  10/11/23    Expected End:  12/11/23       Goals: Goals set and discussed today.     Pt's LE cramping will be eliminated, by week 8   Gait/Locomotion: Pt. will present with normalized gait pattern for improved gait safety  , by week 8   Strength: Pt's eddie LE strength will improve to 4+/5 throughout to allow for improved gait safety  , by week 6

## 2023-11-08 NOTE — PROGRESS NOTES
"Physical Therapy    Physical Therapy Treatment    Patient Name: Isael Haji  MRN: [id number]  Today's Date: 11/8/2023  Time Calculation  Start Time: 0800  Stop Time: 0840  Time Calculation (min): 40 min      Assessment:   Pt tolerated all exercises well with minimal difficulty reporting no change in pain level throughout session.   Plan:   Continue to progress DLS exercises as tolerated     Current Problem  1. Leg weakness, bilateral        2. Gait instability        3. Muscle cramping            Subjective   General   Pt states he does not know if the aquatic therapy has helped much. Pt states it feels better when he stands up and bends backwards.  Precautions  Precautions  Precautions Comment: none  Pain  Pain Assessment: 0-10  Pain Score: 2  Pain Location: Back  Pain Orientation: Lower    Objective   Pt demonstrates forward flexed posture initially after standing that improved with ambulation.   Treatments:     EXERCISES Date 10/19/23 Date 10/25/23 Date 11/1/23 Date Date 11/8/23   VISIT# # 7 # 8 #9 #10 #11   Reassessment   X               Nustep  L2 5 min  L2  5 min L2 x 10 L3  x10' L3 10min           Shuttle   DLP  4B 2x10  4B 2x10 4B 3 x 10 5B 3x10 5B 3x10   Shuttle   SLP       lester  2B x10  2B 2x10 2B 2 x 10 R/L  3B 2 x 10 R/L  3B 2x10 Lester    Shuttle   TR/HR                        Tband   Lat Pulls   Red 2 x 10 Red 2x10   Red 2x10   Tband   Chops    Red 2x10 Red 2x10ea   Tband   Mid Row   Red 2 x 10 Red 2x10 Red 2x10   Tband   Mower starts                SB  DKTC    10x 5\" H    SB   LTR                        3 way LE raise   X10 lester 2 x 10 R/L 2x10 R/L 2x10ea Lester                                                            Stretches:             Hamstrings lester    30 sec x 2 R/L  30 sec x 2 [id number] Lester         Tiltboard calf   30 sec x 3  [id number]              Goals:  Active       PT Problem       PT Goal 1       Start:  10/11/23    Expected End:  12/11/23       Goals: Goals set and discussed today.     Pt's LE " cramping will be eliminated, by week 8   Gait/Locomotion: Pt. will present with normalized gait pattern for improved gait safety  , by week 8   Strength: Pt's eddie LE strength will improve to 4+/5 throughout to allow for improved gait safety  , by week 6

## 2023-11-13 ENCOUNTER — TREATMENT (OUTPATIENT)
Dept: PHYSICAL THERAPY | Facility: HOSPITAL | Age: 63
End: 2023-11-13
Payer: COMMERCIAL

## 2023-11-13 ENCOUNTER — LAB (OUTPATIENT)
Dept: LAB | Facility: LAB | Age: 63
End: 2023-11-13
Payer: COMMERCIAL

## 2023-11-13 DIAGNOSIS — R26.81 GAIT INSTABILITY: Primary | ICD-10-CM

## 2023-11-13 DIAGNOSIS — R25.2 MUSCLE CRAMPING: ICD-10-CM

## 2023-11-13 DIAGNOSIS — Z12.5 SCREENING PSA (PROSTATE SPECIFIC ANTIGEN): ICD-10-CM

## 2023-11-13 DIAGNOSIS — Z12.5 ENCOUNTER FOR SCREENING FOR MALIGNANT NEOPLASM OF PROSTATE: Primary | ICD-10-CM

## 2023-11-13 DIAGNOSIS — R25.2 LEG CRAMP: ICD-10-CM

## 2023-11-13 DIAGNOSIS — I10 ESSENTIAL HYPERTENSION: ICD-10-CM

## 2023-11-13 LAB
ALBUMIN SERPL BCP-MCNC: 4.6 G/DL (ref 3.4–5)
ALP SERPL-CCNC: 87 U/L (ref 33–136)
ALT SERPL W P-5'-P-CCNC: 49 U/L (ref 10–52)
ANION GAP SERPL CALC-SCNC: 11 MMOL/L (ref 10–20)
AST SERPL W P-5'-P-CCNC: 53 U/L (ref 9–39)
BILIRUB SERPL-MCNC: 0.5 MG/DL (ref 0–1.2)
BUN SERPL-MCNC: 18 MG/DL (ref 6–23)
CALCIUM SERPL-MCNC: 10.1 MG/DL (ref 8.6–10.3)
CHLORIDE SERPL-SCNC: 103 MMOL/L (ref 98–107)
CO2 SERPL-SCNC: 30 MMOL/L (ref 21–32)
CREAT SERPL-MCNC: 0.92 MG/DL (ref 0.5–1.3)
ERYTHROCYTE [DISTWIDTH] IN BLOOD BY AUTOMATED COUNT: 13.3 % (ref 11.5–14.5)
GFR SERPL CREATININE-BSD FRML MDRD: >90 ML/MIN/1.73M*2
GLUCOSE SERPL-MCNC: 69 MG/DL (ref 74–99)
HCT VFR BLD AUTO: 40.2 % (ref 41–52)
HGB BLD-MCNC: 13.1 G/DL (ref 13.5–17.5)
MAGNESIUM SERPL-MCNC: 2 MG/DL (ref 1.6–2.4)
MCH RBC QN AUTO: 32.3 PG (ref 26–34)
MCHC RBC AUTO-ENTMCNC: 32.6 G/DL (ref 32–36)
MCV RBC AUTO: 99 FL (ref 80–100)
NRBC BLD-RTO: 0 /100 WBCS (ref 0–0)
PLATELET # BLD AUTO: 214 X10*3/UL (ref 150–450)
POTASSIUM SERPL-SCNC: 4.1 MMOL/L (ref 3.5–5.3)
PROT SERPL-MCNC: 6.8 G/DL (ref 6.4–8.2)
PSA SERPL-MCNC: 2.53 NG/ML
RBC # BLD AUTO: 4.05 X10*6/UL (ref 4.5–5.9)
SODIUM SERPL-SCNC: 140 MMOL/L (ref 136–145)
WBC # BLD AUTO: 7.8 X10*3/UL (ref 4.4–11.3)

## 2023-11-13 PROCEDURE — 36415 COLL VENOUS BLD VENIPUNCTURE: CPT

## 2023-11-13 PROCEDURE — 83735 ASSAY OF MAGNESIUM: CPT

## 2023-11-13 PROCEDURE — 85027 COMPLETE CBC AUTOMATED: CPT

## 2023-11-13 PROCEDURE — 80053 COMPREHEN METABOLIC PANEL: CPT

## 2023-11-13 PROCEDURE — 97110 THERAPEUTIC EXERCISES: CPT | Mod: 59,GP | Performed by: PHYSICAL THERAPIST

## 2023-11-13 PROCEDURE — G0103 PSA SCREENING: HCPCS

## 2023-11-13 PROCEDURE — 97113 AQUATIC THERAPY/EXERCISES: CPT | Mod: GP,CQ | Performed by: PHYSICAL THERAPY ASSISTANT

## 2023-11-13 ASSESSMENT — PAIN SCALES - GENERAL
PAINLEVEL_OUTOF10: 2
PAINLEVEL_OUTOF10: 2

## 2023-11-13 ASSESSMENT — PAIN - FUNCTIONAL ASSESSMENT
PAIN_FUNCTIONAL_ASSESSMENT: 0-10
PAIN_FUNCTIONAL_ASSESSMENT: 0-10

## 2023-11-13 NOTE — PROGRESS NOTES
Physical Therapy    Physical Therapy Treatment    Patient Name: Isael Haji  MRN: [id number]  Today's Date: 11/13/2023  Time Calculation  Start Time: 1345  Stop Time: 1425  Time Calculation (min): 40 min      Assessment:   Strength and gait have improved well    Plan:   Discharge    Current Problem  1. Gait instability [R26.81]        2. Muscle cramping [R25.2]            Subjective   General   Pt. Reports 40-60% improvement in Sx since beginning PT.  LE cramping is unchanged, however.   Precautions  Precautions  Precautions Comment: none       Pain  Pain Assessment: 0-10  Pain Score: 2  Pain Location: Back  Pain Orientation: Lower    Objective     Gait pattern has normalized    LE strength 4+-5/5 throughout          Treatments:  Access Code: [email or web address]  URL: [email or web address]  Date: 11/13/2023  Prepared by: Jesús Knowles    Exercises  - Mini Squat with Counter Support  - 3 x daily - 7 x weekly - 2 sets - 10 reps - 2 seconds hold  - Standing Hip Extension with Unilateral Counter Support  - 3 x daily - 7 x weekly - 2 sets - 10 reps - 2 seconds hold  - Standing Hip Abduction with Unilateral Counter Support  - 3 x daily - 7 x weekly - 2 sets - 10 reps - 2 seconds hold  - Standing Hip Flexion AROM  - 3 x daily - 7 x weekly - 2 sets - 10 reps - 2 seconds hold  - Standing Shoulder Row with Anchored Resistance  - 3 x daily - 7 x weekly - 2 sets - 10 reps - 2 seconds hold  - Shoulder extension with resistance - Neutral  - 3 x daily - 7 x weekly - 2 sets - 10 reps - 2 seconds hold  - Standing Cable Chops  - 3 x daily - 7 x weekly - 2 sets - 10 reps - 2 seconds hold  EXERCISES Date 10/19/23 Date 10/25/23 Date 11/1/23 Date Date 11/8/23 11/13/23   VISIT# # 7 # 8 #9 #10 #11  #12   Reassessment   X        minsquat       2x10   Nustep  L2 5 min  L2  5 min L2 x 10 L3  x10' L3 10min  L3 10Min            Shuttle   DLP  4B 2x10  4B 2x10 4B 3 x 10 5B 3x10 5B 3x10    Shuttle   SLP       eddie  2B x10  2B 2x10 2B 2  "x 10 R/L  3B 2 x 10 R/L  3B 2x10 Lester     Shuttle   TR/HR                           Tband   Lat Pulls   Red 2 x 10 Red 2x10   Red 2x10 Red 2x10   Tband   Chops    Red 2x10 Red 2x10ea  Red 2x10   Tband   Mid Row   Red 2 x 10 Red 2x10 Red 2x10  Red 2x10   Tband   Mower starts                  SB  DKTC    10x 5\" H     SB   LTR                           3 way LE raise   X10 lester 2 x 10 R/L 2x10 R/L 2x10ea Lester   2x10ea Lester             Gait assessment  Lester LE isometric hip flexion, hip abd, hip add, knee ext, knee flex                                                      Stretches:              Hamstrings lester    30 sec x 2 R/L  30 sec x 2 [id number] Lester          Tiltboard calf   30 sec x 3  [id number]                     Goals:  Active       PT Problem       PT Goal 1       Start:  10/11/23    Expected End:  12/11/23       Goals: Goals set and discussed today.     Pt's LE cramping will be eliminated, by week 8   Gait/Locomotion: Pt. will present with normalized gait pattern for improved gait safety  , by week 8   Strength: Pt's lester LE strength will improve to 4+/5 throughout to allow for improved gait safety  , by week 6               "

## 2023-11-13 NOTE — PROGRESS NOTES
"Physical Therapy    Physical Therapy Treatment    Patient Name: Isael Haji  MRN: [id number]  Today's Date: 11/13/2023  Time Calculation  Start Time: 1430  Stop Time: 1515  Time Calculation (min): 45 min      Assessment:  PT Assessment  Evaluation/Treatment Tolerance: Patient tolerated treatment well  Assessment Comment: pt seems to have met Max potential and will be dc today. His pain has decreased and posture and strength have improved.    Plan:  OP PT Plan  Treatment/Interventions: Aquatic therapy  PT Plan: Skilled PT (pt to be dc after session.)    Current Problem  1. Gait instability        2. Muscle cramping            Subjective  Cornelius reported he is feeling much better. He declined HEP secondary to no access to pool pt reported he felt like therapy helped him ..        Precautions  Precautions  Precautions Comment: none    Pain  Pain Assessment: 0-10  Pain Score: 2  Pain Type: Chronic pain  Pain Location: Back  Pain Orientation: Lower    Objective  pt educated on importance of follow through on land.         Treatments:  Aquatic Exercise  Aquatic Exercise Performed: Yes  Aquatic Exercise Activity 1: Amb Forw/Retro/Lat X 2 ea with BDB  Aquatic Exercise Activity 2: Marching x 2 laps with BDB  Aquatic Exercise Activity 3: TR/HR X 20 EA  Aquatic Exercise Activity 4: Squats X 20 with BDB  Aquatic Exercise Activity 5: SLR X 3- 20 EA quick kick  Aquatic Exercise Activity 6: Step ups X 20 EA  Aquatic Exercise Activity 7: gastroc and HS stretches 3 X 15\" EA  Aquatic Exercise Activity 8: Deep water Biking and Distraction 5' and 5'  Aquatic Exercise Activity 9: Deep water kicks JJ and X-country - 2 X 20 EA  Aquatic Exercise Activity 10: Tray ex's PUSH/PULL, UP/DOWN X 20 EA - T2  Aquatic Exercise Activity 11: wand exercises with dumb bells shoulder flexion, ab/adduction and horizontal abduction x10 ea            Goals:  Active       PT Problem       PT Goal 1       Start:  10/11/23    Expected End:  12/11/23       Goals: " Goals set and discussed today.     Pt's LE cramping will be eliminated, by week 8   Gait/Locomotion: Pt. will present with normalized gait pattern for improved gait safety  , by week 8   Strength: Pt's eddie LE strength will improve to 4+/5 throughout to allow for improved gait safety  , by week 6

## 2023-12-04 ENCOUNTER — OFFICE VISIT (OUTPATIENT)
Dept: ORTHOPEDIC SURGERY | Facility: CLINIC | Age: 63
End: 2023-12-04
Payer: COMMERCIAL

## 2023-12-04 DIAGNOSIS — M54.16 LUMBAR RADICULOPATHY: Primary | ICD-10-CM

## 2023-12-04 DIAGNOSIS — M48.07 SPINAL STENOSIS OF LUMBOSACRAL REGION: ICD-10-CM

## 2023-12-04 PROCEDURE — 1036F TOBACCO NON-USER: CPT | Performed by: PHYSICIAN ASSISTANT

## 2023-12-04 PROCEDURE — 99204 OFFICE O/P NEW MOD 45 MIN: CPT | Performed by: PHYSICIAN ASSISTANT

## 2023-12-04 ASSESSMENT — PAIN SCALES - GENERAL: PAINLEVEL_OUTOF10: 8

## 2023-12-04 ASSESSMENT — PAIN DESCRIPTION - DESCRIPTORS: DESCRIPTORS: ACHING;BURNING

## 2023-12-04 ASSESSMENT — PAIN - FUNCTIONAL ASSESSMENT: PAIN_FUNCTIONAL_ASSESSMENT: 0-10

## 2023-12-04 NOTE — PROGRESS NOTES
Subjective    Patient ID: Isael Haji is a 63 y.o. male.    Chief Complaint: Pain of the Lower Back     Isael is a 63-year-old male reporting clinic today for evaluation of his chronic low back pain with radiculopathy.  His symptoms started approximately 5 years ago, he denies injury or trauma.  He has centrally located low back pain that radiates in beltline distribution.  He has pain radiating down his legs bilaterally, left worse than right.  His leg pain is located in his quadriceps and stops at his knees.  Symptoms are increased with sitting and lifting.  His symptoms are decreased by drinking beer.  He denies weakness, dragging tripping over his feet.  He has full control of his bowel bladder.    He had a previous L2-4 laminectomy completed by Dr. Lin in November 2022, he does not feel this surgery provided any relief of his symptoms.    He recently completed physical therapy, he went twice a week for 6 weeks.  This consisted of land and water therapy.  He denies any improvement.  He is an established patient with pain management, currently no procedures have been completed.    Family, social, and medical histories are obtained and reviewed.  Current smoker, 1.5 packs/day.  Currently consumes approximately 14 beers daily.    I reviewed the complete 30-point review of systems that was documented on the scanned patient intake form.  All other systems are non-contributory except as defined in history of present illness.      Objective   Back Exam     Comments:  Const: Well-appearing, well-nourished male in no distress.  Eyes: Normal appearing sclera and conjunctiva, no jaundice, pupils normal in appearance.  Resp: breathing comfortably, normal respiratory rate.  CV: No upper or lower extremity edema.  Musculoskeletal: Normal gait.  Lumbar ROM is supple.  Strength exam of the lower extremities reveals 5/5 strength in all major muscle groups.  Negative straight leg raise bilaterally.  Neuro: Sensation is  intact and equal bilaterally. Deep tendon reflexes are normal and symmetric.  No clonus.  Skin: Intact without any lesions, normal turgor.  Psych: Alert and oriented x3, normal mood and affect.        I personally viewed x-rays of the lumbar spine completed on 9/8.  There is evidence of previous L2-4 laminectomy.  There is no evidence of acute fracture or instability.    I had a long discussion with the patient and his wife about his current nicotine and alcohol consumption.  He is currently not a surgical candidate.  I strongly recommended he make a follow-up appointment with his primary care provider to discuss further support with smoking and alcohol cessation.  He is going to continue with his physical therapy exercises at home.  He is going to follow-up with pain management and discuss the option for possible epidural steroid injections.  He should continue with the previously prescribed prednisone and tizanidine.  If he can become nicotine free and has cut back on his daily alcohol consumption he should follow-up with me at which time I would order an updated MRI of the lumbar spine.    **This note was dictated using speech recognition software and was not corrected for spelling or grammatical errors**

## 2023-12-12 ENCOUNTER — OFFICE VISIT (OUTPATIENT)
Dept: PRIMARY CARE | Facility: CLINIC | Age: 63
End: 2023-12-12
Payer: COMMERCIAL

## 2023-12-12 VITALS
HEART RATE: 79 BPM | OXYGEN SATURATION: 95 % | HEIGHT: 65 IN | SYSTOLIC BLOOD PRESSURE: 121 MMHG | DIASTOLIC BLOOD PRESSURE: 75 MMHG | TEMPERATURE: 98.9 F | RESPIRATION RATE: 16 BRPM | BODY MASS INDEX: 24.32 KG/M2 | WEIGHT: 146 LBS

## 2023-12-12 DIAGNOSIS — I10 ESSENTIAL HYPERTENSION: ICD-10-CM

## 2023-12-12 DIAGNOSIS — F17.219 CIGARETTE NICOTINE DEPENDENCE WITH NICOTINE-INDUCED DISORDER: ICD-10-CM

## 2023-12-12 DIAGNOSIS — F10.20 ALCOHOLISM, CHRONIC (MULTI): Primary | ICD-10-CM

## 2023-12-12 DIAGNOSIS — R25.2 LEG CRAMP: ICD-10-CM

## 2023-12-12 PROBLEM — G97.1 POST-DURAL PUNCTURE HEADACHE: Status: RESOLVED | Noted: 2023-01-20 | Resolved: 2023-12-12

## 2023-12-12 PROBLEM — F10.10 ETOH ABUSE: Status: RESOLVED | Noted: 2018-01-19 | Resolved: 2023-12-12

## 2023-12-12 PROBLEM — G89.18 POST-OP PAIN: Status: RESOLVED | Noted: 2023-01-20 | Resolved: 2023-12-12

## 2023-12-12 PROBLEM — M25.512 ACUTE PAIN OF LEFT SHOULDER: Status: RESOLVED | Noted: 2018-01-19 | Resolved: 2023-12-12

## 2023-12-12 PROBLEM — I25.10 ATHSCL HEART DISEASE OF NATIVE CORONARY ARTERY W/O ANG PCTRS: Status: RESOLVED | Noted: 2020-11-28 | Resolved: 2023-12-12

## 2023-12-12 PROBLEM — M79.2 NEUROPATHIC PAIN: Status: RESOLVED | Noted: 2023-08-31 | Resolved: 2023-12-12

## 2023-12-12 PROBLEM — R07.9 CHEST PAIN: Status: RESOLVED | Noted: 2018-01-19 | Resolved: 2023-12-12

## 2023-12-12 PROBLEM — M25.69 DECREASED RANGE OF MOTION OF TRUNK AND BACK: Status: RESOLVED | Noted: 2023-01-20 | Resolved: 2023-12-12

## 2023-12-12 PROBLEM — M54.50 BACK PAIN, LUMBOSACRAL: Status: RESOLVED | Noted: 2023-01-20 | Resolved: 2023-12-12

## 2023-12-12 PROCEDURE — 3078F DIAST BP <80 MM HG: CPT

## 2023-12-12 PROCEDURE — 99406 BEHAV CHNG SMOKING 3-10 MIN: CPT

## 2023-12-12 PROCEDURE — 99212 OFFICE O/P EST SF 10 MIN: CPT

## 2023-12-12 PROCEDURE — G0443 BRIEF ALCOHOL MISUSE COUNSEL: HCPCS

## 2023-12-12 PROCEDURE — 3074F SYST BP LT 130 MM HG: CPT

## 2023-12-12 RX ORDER — METHOCARBAMOL 500 MG/1
500 TABLET, FILM COATED ORAL DAILY PRN
COMMUNITY
End: 2024-04-08 | Stop reason: SDUPTHER

## 2023-12-12 SDOH — ECONOMIC STABILITY: FOOD INSECURITY: WITHIN THE PAST 12 MONTHS, THE FOOD YOU BOUGHT JUST DIDN'T LAST AND YOU DIDN'T HAVE MONEY TO GET MORE.: NEVER TRUE

## 2023-12-12 SDOH — ECONOMIC STABILITY: FOOD INSECURITY: WITHIN THE PAST 12 MONTHS, YOU WORRIED THAT YOUR FOOD WOULD RUN OUT BEFORE YOU GOT MONEY TO BUY MORE.: NEVER TRUE

## 2023-12-12 ASSESSMENT — LIFESTYLE VARIABLES
HOW OFTEN DO YOU HAVE SIX OR MORE DRINKS ON ONE OCCASION: LESS THAN MONTHLY
AUDIT-C TOTAL SCORE: 6
SKIP TO QUESTIONS 9-10: 0
HOW MANY STANDARD DRINKS CONTAINING ALCOHOL DO YOU HAVE ON A TYPICAL DAY: 5 OR 6
HOW OFTEN DO YOU HAVE A DRINK CONTAINING ALCOHOL: 2-3 TIMES A WEEK

## 2023-12-12 ASSESSMENT — ENCOUNTER SYMPTOMS
CONSTITUTIONAL NEGATIVE: 1
CARDIOVASCULAR NEGATIVE: 1
MUSCULOSKELETAL NEGATIVE: 1
ENDOCRINE NEGATIVE: 1
EYES NEGATIVE: 1
PSYCHIATRIC NEGATIVE: 1
HEMATOLOGIC/LYMPHATIC NEGATIVE: 1
RESPIRATORY NEGATIVE: 1
NEUROLOGICAL NEGATIVE: 1
GASTROINTESTINAL NEGATIVE: 1

## 2023-12-12 ASSESSMENT — PATIENT HEALTH QUESTIONNAIRE - PHQ9
1. LITTLE INTEREST OR PLEASURE IN DOING THINGS: SEVERAL DAYS
SUM OF ALL RESPONSES TO PHQ9 QUESTIONS 1 & 2: 2
10. IF YOU CHECKED OFF ANY PROBLEMS, HOW DIFFICULT HAVE THESE PROBLEMS MADE IT FOR YOU TO DO YOUR WORK, TAKE CARE OF THINGS AT HOME, OR GET ALONG WITH OTHER PEOPLE: NOT DIFFICULT AT ALL
2. FEELING DOWN, DEPRESSED OR HOPELESS: SEVERAL DAYS

## 2023-12-12 ASSESSMENT — PAIN SCALES - GENERAL: PAINLEVEL: 7

## 2023-12-12 NOTE — ASSESSMENT & PLAN NOTE
Currently drinking 8-18 beers per day  Not interested in cutting back    **I spent 5 minutes of this visit providing individualized alcohol counseling to the patient including the health risks associated with continued drinking and benefits of quitting as well as treatment options available

## 2023-12-12 NOTE — ASSESSMENT & PLAN NOTE
Normotensive in office at 121/75  Home blood pressures running    Continue amlodipine 5mg daily, carvedilol 6.25mg twice daily, lisinopril 40mg daily

## 2023-12-12 NOTE — ASSESSMENT & PLAN NOTE
Reporting leg cramping, interested in evaluation for medication changes  Currently following with Sola Crowder NP in pain medicine  Only using methocarbamol sparingly  Advised to take as recommended by pain medicine and to continue gabapentin  Low back and leg stretches handout given to him in office today  Advised to discuss spinal injections with pain medicine

## 2023-12-12 NOTE — PATIENT INSTRUCTIONS
Thank you for coming to see me today.  If you have any questions or concerns following our visit, please contact the office.  Phone: [phone]    Follow up with me in 6 months or sooner as needed    1)  Do back stretches daily    2) Check blood pressures at home 3 times per week, making sure you rest 5 minutes prior to taking a reading.  Write the readings down on blood pressure log and bring this log to your next visit with me.     3)  Ice your low back pain, 15 minutes on, 15 minutes off 2-3 times a day while symptomatic     4) I strongly recommend you consider quitting smoking and significantly cutting back on alcohol intake. If you would like any assistance with this please let me know

## 2023-12-12 NOTE — PROGRESS NOTES
"Subjective   Patient ID: Isael Haji is a 63 y.o. male who presents for hypertension follow up.    Home blood pressures a few times 155/100, doesn't remember last reading. Currently smoking about 1 PPD, tends to get get bored. Quit for 3-4 months in the past, went back it due to boredom. Not currently interested in quitting.  Reports drinking 8-18 beers per day which helps with pain. Unsure if seeing pain medicine will incentivize him to quit. Reports having had one injection in his back in the past, thinks it \"went alright.\"  Following with pain medicine, taking robaxin 1/2 tablet periodically but still having leg cramping.     Diet: sandwiches, pork chops cooked in crock pot. Fried chicken monthly. Pork loins. Beef hamburgers and steak x3 times per month. Greenbeans. No at lot of processed foods. X2 cups of coffee per day with milk. Sweat tea 20oz bottle per day.   Exercise: No regular exercise, due to COPD  Weight: stable  Water: 1 bottle per day  Sleep: Not good due to leg cramping. Six hours of sleep.   Social: . 1 son, near by. Mobile home. Sister lives with him, has a pit bull.   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     Review of Systems   Constitutional: Negative.    HENT: Negative.     Eyes: Negative.    Respiratory: Negative.     Cardiovascular: Negative.    Gastrointestinal: Negative.    Endocrine: Negative.    Genitourinary: Negative.    Musculoskeletal: Negative.    Skin: Negative.    Neurological: Negative.    Hematological: Negative.    Psychiatric/Behavioral: Negative.          Current Outpatient Medications   Medication Sig Dispense Refill    albuterol 90 mcg/actuation inhaler Inhale 2 puffs every 4 hours if needed.      amLODIPine (Norvasc) 5 mg tablet Take 1 tablet (5 mg) by mouth once daily. 90 tablet 3    aspirin 81 mg chewable tablet Chew 1 tablet (81 mg) once daily. Chew and swallow.      atorvastatin (Lipitor) 80 mg tablet Take 1 tablet (80 mg) by mouth once " daily. 90 tablet 3    carvedilol (Coreg) 6.25 mg tablet Take 1 tablet (6.25 mg) by mouth 2 times a day with meals.      gabapentin (Neurontin) 600 mg tablet Take 1 tablet (600 mg) by mouth once daily. 90 tablet 3    lisinopril 40 mg tablet Take 1 tablet (40 mg) by mouth once daily. 90 tablet 3    miscellaneous medical supply (Blood Pressure Cuff) misc 1 Units once daily. 1 each 0    sildenafil (Viagra) 100 mg tablet Take 1 tablet (100 mg) by mouth 2 times a week. As needed 10 tablet 3    umeclidinium-vilanteroL (Anoro Ellipta) 62.5-25 mcg/actuation blister with device Inhale 1 puff once daily.      methocarbamol (Robaxin) 500 mg tablet Take 1 tablet (500 mg) by mouth once daily as needed for muscle spasms.       No current facility-administered medications for this visit.     Past Surgical History:   Procedure Laterality Date    MR HEAD ANGIO WO IV CONTRAST  11/29/2020    MR HEAD ANGIO WO IV CONTRAST 11/29/2020 [facility]    MR NECK ANGIO WO IV CONTRAST  11/29/2020    MR NECK ANGIO WO IV CONTRAST 11/29/2020 [facility]    OTHER SURGICAL HISTORY  12/09/2020    Rotator cuff repair    OTHER SURGICAL HISTORY  12/09/2020    Cardiac catheterization with stent placement    OTHER SURGICAL HISTORY  12/29/2022    Lower back surgery    OTHER SURGICAL HISTORY  08/31/2021    Arm surgery     Family History   Problem Relation Name Age of Onset    Diabetes Mother      Lung cancer Mother      Coronary artery disease Father        Social History     Tobacco Use    Smoking status: Every Day     Packs/day: 1     Types: Cigarettes    Smokeless tobacco: Never   Vaping Use    Vaping Use: Never used   Substance Use Topics    Alcohol use: Yes     Alcohol/week: 60.0 standard drinks of alcohol     Types: 60 Cans of beer per week    Drug use: Never        Objective     Visit Vitals  /75 (BP Location: Left arm, Patient Position: Sitting, BP Cuff Size: Adult)   Pulse 79   Temp 37.2 °C (98.9 °F) (Temporal)   Resp 16   Ht  "1.651 m (5' 5\")   Wt 66.2 kg (146 lb)   SpO2 95%   BMI 24.30 kg/m²   Smoking Status Every Day   BSA 1.74 m²        Physical Exam  Constitutional:       Appearance: Normal appearance.   HENT:      Head: Normocephalic and atraumatic.   Eyes:      Extraocular Movements: Extraocular movements intact.      Pupils: Pupils are equal, round, and reactive to light.   Cardiovascular:      Rate and Rhythm: Normal rate and regular rhythm.   Pulmonary:      Effort: Pulmonary effort is normal.      Breath sounds: Normal breath sounds.   Abdominal:      General: Abdomen is flat. Bowel sounds are normal.      Palpations: Abdomen is soft.   Musculoskeletal:         General: Normal range of motion.   Skin:     General: Skin is warm and dry.      Capillary Refill: Capillary refill takes less than 2 seconds.   Neurological:      General: No focal deficit present.      Mental Status: He is alert and oriented to person, place, and time.   Psychiatric:         Mood and Affect: Mood normal.         Behavior: Behavior normal.           Assessment/Plan   Problem List Items Addressed This Visit       Alcoholism, chronic (CMS/HCC) - Primary     Currently drinking 8-18 beers per day  Not interested in cutting back    **I spent 5 minutes of this visit providing individualized alcohol counseling to the patient including the health risks associated with continued drinking and benefits of quitting as well as treatment options available           Essential hypertension     Normotensive in office at 121/75  Home blood pressures running    Continue amlodipine 5mg daily, carvedilol 6.25mg twice daily, lisinopril 40mg daily         Leg cramp     Reporting leg cramping, interested in evaluation for medication changes  Currently following with Sola Crowder NP in pain medicine  Only using methocarbamol sparingly  Advised to take as recommended by pain medicine and to continue gabapentin  Low back and leg stretches handout given to him in office " today  Advised to discuss spinal injections with pain medicine           Nicotine dependence     Currently smoking about 1 PPD  Advised to cut back/give serious consideration to quitting given his multiple health conditions   Not currently interested in quitting  Advised him to call me if he would like assistance with quitting    **I spent 5 minutes of this visit providing individualized smoking cessation counseling to the patient including the health risks associated with continued smoking and benefits of quitting as well as treatment options available             All pertinent lab work and results were reviewed with patient.     Follow up with me in 6 months    Arelis Zee, HEN[facility]CNS

## 2023-12-28 ENCOUNTER — APPOINTMENT (OUTPATIENT)
Dept: UROLOGY | Facility: CLINIC | Age: 63
End: 2023-12-28
Payer: COMMERCIAL

## 2024-01-03 ENCOUNTER — OFFICE VISIT (OUTPATIENT)
Dept: UROLOGY | Facility: CLINIC | Age: 64
End: 2024-01-03
Payer: COMMERCIAL

## 2024-01-03 DIAGNOSIS — Z12.5 SCREENING PSA (PROSTATE SPECIFIC ANTIGEN): ICD-10-CM

## 2024-01-03 DIAGNOSIS — N52.8 OTHER MALE ERECTILE DYSFUNCTION: Primary | ICD-10-CM

## 2024-01-03 DIAGNOSIS — N40.0 BENIGN PROSTATIC HYPERPLASIA, UNSPECIFIED WHETHER LOWER URINARY TRACT SYMPTOMS PRESENT: ICD-10-CM

## 2024-01-03 LAB
POC BILIRUBIN, URINE: NEGATIVE
POC BLOOD, URINE: NEGATIVE
POC GLUCOSE, URINE: NEGATIVE MG/DL
POC KETONES, URINE: NEGATIVE MG/DL
POC LEUKOCYTES, URINE: NEGATIVE
POC NITRITE,URINE: NEGATIVE
POC PH, URINE: 5.5 PH
POC PROTEIN, URINE: NEGATIVE MG/DL
POC SPECIFIC GRAVITY, URINE: 1.02
POC UROBILINOGEN, URINE: 0.2 EU/DL

## 2024-01-03 PROCEDURE — 99213 OFFICE O/P EST LOW 20 MIN: CPT | Performed by: UROLOGY

## 2024-01-03 PROCEDURE — 81003 URINALYSIS AUTO W/O SCOPE: CPT | Performed by: UROLOGY

## 2024-01-03 RX ORDER — EZETIMIBE 10 MG/1
10 TABLET ORAL DAILY
COMMUNITY
Start: 2023-12-21 | End: 2024-04-30 | Stop reason: SDUPTHER

## 2024-01-03 RX ORDER — SILDENAFIL 100 MG/1
100 TABLET, FILM COATED ORAL DAILY PRN
Qty: 10 TABLET | Refills: 3 | Status: SHIPPED | OUTPATIENT
Start: 2024-01-03 | End: 2024-02-12

## 2024-01-03 NOTE — PROGRESS NOTES
01/03/2023  Voiding well, Viagra worked well 100 mg    Patient has no nausea, no vomiting, no fever.    MAHENDRA: Deferred    PSA: Normal       We discussed the effectiveness of Viagra, increased dose to 100 mg  We discussed the benign prostate hypertrophy, PSA screening, normal PSA  All the questions were answered, the patient expressed understanding and agreed to the plan.     Impression  ED  BPH  PSA screening     Plan  Change to Viagra 100 mg twice a week as needed  Yearly MAHENDRA and PSA      Chief Complaint   Patient presents with    Benign Prostatic Hypertrophy     Voiding well.     Erectile Dysfunction     Patient is happy with Viagra 100 mg        Physical Exam     TODAYS LAB RESULTS:    PSA 11/13/2023  2.53    POC Glucose, Urine  NEGATIVE mg/dl NEGATIVE   POC Bilirubin, Urine  NEGATIVE NEGATIVE   POC Ketones, Urine  NEGATIVE mg/dl NEGATIVE   POC Specific Gravity, Urine  1.005 - 1.035 1.020   POC Blood, Urine  NEGATIVE NEGATIVE   POC PH, Urine  No Reference Range Established PH 5.5   POC Protein, Urine  NEGATIVE, 30 (1+) mg/dl NEGATIVE   POC Urobilinogen, Urine  0.2, 1.0 EU/DL 0.2   Poc Nitrite, Urine  NEGATIVE NEGATIVE   POC Leukocytes, Urine  NEGATIVE NEGATIVE       ASSESSMENT&PLAN:      IMPRESSIONS:     12/29/2022  50 mg Viagra worked partially, some headache; status post back surgery     Patient has no nausea, no vomiting, no fever.     Patient here today for 2 month follow up on ED, BPH. Patient was given script for Viagra 50mg PRN.   PSA 11/09/22 1.53  Patient states the Viagra is not helping how he would like it to.   He had back surgery done 11/22/22 per Dr. Lin, he is still having pain due to this. Otherwise patient states he is doing well.   -JMorgenstern CMA     We discussed the effectiveness of Viagra, increased dose to 100 mg  We discussed the benign prostate hypertrophy, PSA screening, normal PSA  All the questions were answered, the patient expressed understanding and agreed to the plan.      Impression  ED  BPH  PSA screening     Plan  Change to Viagra 100 mg twice a week as needed  Yearly MAHENDRA and PSA           10/31/2022  62-year-old gentleman developed erectile dysfunction over a year, no voiding complaints     Patient has no nausea, no vomiting, no fever.     MAHENDRA: 1+ no nodule     Exam: Circumcised, normal testes normal penis     Patient here today as a new patient.  Referral from Dr. Geneva Skelton for Erectile dysfunction.     IO Glucose - Urine Negative REQUIRED    IO Bilirubin Negative REQUIRED    IO Ketones Negative REQUIRED    IO Specific Gravity 1.020 REQUIRED    IO Blood Negative REQUIRED    IO pH 6.0 REQUIRED    IO Protein, Urine Negative REQUIRED    IO Urobilinogen Normal (0.2-1.0 mg/dl) REQUIRED    IO Nitrite, Urine Negative REQUIRED    IO Leukocytes Negative REQUIRED      Marylin Derrit CMA     We discussed erectile dysfunction, multifactorial including smoking high blood pressure, age etc.  We discussed Viagra trial indication risk of benefit side effect  We discussed benign prostate hypertrophy and PSA screening  All the questions were answered, the patient expressed understanding and agreed to the plan.     Impression  ED  BPH  PSA screening     Plan  PSA check  Viagra 50 mg twice a week as needed x5, call back for refill  Appointment in 2 months        PSA   11/13/2023  2.53  11/09/22 1.53

## 2024-02-26 NOTE — TELEPHONE ENCOUNTER
Called in new Rx for carvedilol 6.25 mg BID #[id number] with three refills at [facility] in [place].

## 2024-03-16 DIAGNOSIS — I25.10 ATHEROSCLEROSIS OF NATIVE CORONARY ARTERY OF NATIVE HEART WITHOUT ANGINA PECTORIS: ICD-10-CM

## 2024-03-18 RX ORDER — LISINOPRIL 40 MG/1
40 TABLET ORAL DAILY
Qty: 90 TABLET | Refills: 0 | Status: SHIPPED | OUTPATIENT
Start: 2024-03-18

## 2024-04-08 ENCOUNTER — OFFICE VISIT (OUTPATIENT)
Dept: PAIN MEDICINE | Facility: HOSPITAL | Age: 64
End: 2024-04-08
Payer: COMMERCIAL

## 2024-04-08 VITALS
RESPIRATION RATE: 16 BRPM | DIASTOLIC BLOOD PRESSURE: 78 MMHG | OXYGEN SATURATION: 97 % | HEART RATE: 65 BPM | BODY MASS INDEX: 24.78 KG/M2 | HEIGHT: 65 IN | SYSTOLIC BLOOD PRESSURE: 120 MMHG | WEIGHT: 148.7 LBS

## 2024-04-08 DIAGNOSIS — M51.36 DEGENERATION OF INTERVERTEBRAL DISC OF LUMBAR REGION: ICD-10-CM

## 2024-04-08 DIAGNOSIS — M62.838 MUSCLE SPASM: Primary | ICD-10-CM

## 2024-04-08 DIAGNOSIS — M48.07 SPINAL STENOSIS OF LUMBOSACRAL REGION: ICD-10-CM

## 2024-04-08 PROCEDURE — 3074F SYST BP LT 130 MM HG: CPT | Performed by: CLINICAL NURSE SPECIALIST

## 2024-04-08 PROCEDURE — 99214 OFFICE O/P EST MOD 30 MIN: CPT | Performed by: CLINICAL NURSE SPECIALIST

## 2024-04-08 PROCEDURE — 3078F DIAST BP <80 MM HG: CPT | Performed by: CLINICAL NURSE SPECIALIST

## 2024-04-08 RX ORDER — GABAPENTIN 300 MG/1
900 CAPSULE ORAL NIGHTLY
Qty: 90 CAPSULE | Refills: 3 | Status: SHIPPED | OUTPATIENT
Start: 2024-04-08 | End: 2024-06-05 | Stop reason: SDUPTHER

## 2024-04-08 RX ORDER — METHOCARBAMOL 500 MG/1
500 TABLET, FILM COATED ORAL DAILY PRN
Qty: 30 TABLET | Refills: 3 | Status: SHIPPED | OUTPATIENT
Start: 2024-04-08 | End: 2024-05-08

## 2024-04-08 ASSESSMENT — ENCOUNTER SYMPTOMS
DEPRESSION: 0
LOSS OF SENSATION IN FEET: 0
OCCASIONAL FEELINGS OF UNSTEADINESS: 1

## 2024-04-08 ASSESSMENT — PATIENT HEALTH QUESTIONNAIRE - PHQ9
2. FEELING DOWN, DEPRESSED OR HOPELESS: NOT AT ALL
SUM OF ALL RESPONSES TO PHQ9 QUESTIONS 1 AND 2: 0
1. LITTLE INTEREST OR PLEASURE IN DOING THINGS: NOT AT ALL

## 2024-04-08 NOTE — PROGRESS NOTES
Subjective   Patient ID: Isael Haji is a 63 y.o. male who presents for lumbar postlaminectomy syndrome    HPI    63-year-old male with history of CAD, COPD, CVA, hyperlipidemia, increased LFTs/chronic alcoholism, spinal stenosis status post laminectomy/discectomy and ulnar neuropathy.  Previous spine surgery November 2022 which patient states provided limited relief for back pain.  He has completed a formal course of physical therapy including recently aqua therapy with limited relief.  Maintained on gabapentin 600 mg at bedtime and methocarbamol for muscle tightness and spasm.  Patient was planning to follow-up with a new Ortho/spine surgeon for reevaluation at his last office visit.  Patient presents at today's office visit with chronic low back pain which radiates into his bilateral hips.  Pain will occasionally radiate to bilateral thighs.  He has numbness and tingling and intermittent weakness in his lower extremities.  He also has cramping in his lower legs which she states keeps him up at night.  He denies any changes in bowel/bladder function.  He describes his pain as aching and burning.  States that leg cramping is most bothersome.  Standing, bending and lifting increases pain.  He is managing his pain with gabapentin 600 mg at bedtime and methocarbamol which he uses intermittently.  He does state that the methocarbamol is helpful when he takes it.  He was reevaluated by Ortho/spine surgeon for persistent lumbar radicular pain status post laminectomy/fusion.  Reviewed notes from Ortho/spine surgery indicating that the patient needs to stop smoking and drinking prior to any consideration of surgical intervention.  Patient states he has spoken to his primary care about smoking cessation and was offered a nicotine patch.  He does not want to utilize a nicotine patch due to fear of potential stroke as he is unsure he can completely abstain from cigarettes.  He continues home exercises and stretches after  recently completing a formal course of aqua therapy.        Location of Pain: pt is here for interval follow up. Pt has low back pain and also burning pain at site of surgery. Intermittent pain in bilateral hips.         Pain Score:  6/10    Other pain medication/neuromodulator: Gabapentin at bedtime/ Methocarbamol- needs refill    Therapeutic Goals: I would like to sleep better    Therapeutic Assessment: I am up most of the night and I cramp up    Injections and/or Procedures: denies    Other: Water therapy completed  OARRS:  Sola Crowder, APRN-CNP, APRN-CNS on 4/8/2024  8:10 AM  I have personally reviewed the OARRS report for Isael Haji. I have considered the risks of abuse, dependence, addiction and diversion    Is the patient prescribed a combination of a benzodiazepine and opioid?  No    Last Urine Drug Screen / ordered today: No  No results found for this or any previous visit (from the past 8760 hour(s)).    Controlled Substance Agreement:  Date of the Last Agreement:       Monitoring and compliance:    ORT:    PDUQ:    Office Agreement:      Review of Systems    ROS:   General: No fevers, chills, weight loss  Skin: Negative for lesions  Eyes: No acute vision changes  Ears: No vertigo  Nose, mouth, throat: No difficulty swallowing or speaking  Respiratory: No cough, shortness of breath, cyanosis  Cardiovascular: Negative for chest pain syncope or palpitation  Gastrointestinal: No constipation, nausea, vomiting  Neurological: Negative for headache, positive for: Intermittent paresthesia and weakness lower extremities  Psychological: Negative for severe or debilitating anxiety, depression. Negative memory loss  Musculoskeletal: Positive for arthralgia, myalgia, pain, cramping/spasm  Endocrine: Negative for weight gain, appetite changes, excessive sweating  Allergy/immune: Negative    All 13 systems were reviewed and are within normal levels except as noted or in the history of present illness.  Positive  or pertinent negative responses are noted or were in the history of present illness. As noted, the patient denies significant or impairing weakness in the bilateral upper and lower extremities, medication induced constipation, and bowel or bladder incontinence.     Current Outpatient Medications:     albuterol 90 mcg/actuation inhaler, Inhale 2 puffs every 4 hours if needed., Disp: , Rfl:     amLODIPine (Norvasc) 5 mg tablet, Take 1 tablet (5 mg) by mouth once daily., Disp: 90 tablet, Rfl: 3    aspirin 81 mg chewable tablet, Chew 1 tablet (81 mg) once daily. Chew and swallow., Disp: , Rfl:     atorvastatin (Lipitor) 80 mg tablet, Take 1 tablet (80 mg) by mouth once daily., Disp: 90 tablet, Rfl: 3    carvedilol (Coreg) 6.25 mg tablet, Take 1 tablet (6.25 mg) by mouth 2 times a day with meals., Disp: , Rfl:     ezetimibe (Zetia) 10 mg tablet, Take 1 tablet (10 mg) by mouth once daily., Disp: , Rfl:     lisinopril 40 mg tablet, TAKE ONE TABLET BY MOUTH DAILY, Disp: 90 tablet, Rfl: 0    miscellaneous medical supply (Blood Pressure Cuff) misc, 1 Units once daily., Disp: 1 each, Rfl: 0    umeclidinium-vilanteroL (Anoro Ellipta) 62.5-25 mcg/actuation blister with device, Inhale 1 puff once daily., Disp: , Rfl:     gabapentin (Neurontin) 300 mg capsule, Take 3 capsules (900 mg) by mouth once daily at bedtime., Disp: 90 capsule, Rfl: 3    methocarbamol (Robaxin) 500 mg tablet, Take 1 tablet (500 mg) by mouth once daily as needed for muscle spasms., Disp: 30 tablet, Rfl: 3    sildenafil (Viagra) 100 mg tablet, Take 1 tablet (100 mg) by mouth once daily as needed for erectile dysfunction., Disp: 10 tablet, Rfl: 3     Past Medical History:   Diagnosis Date    Long term (current) use of opiate analgesic 05/04/2022    Long term (current) use of opiate analgesic    Other bursal cyst, unspecified elbow 04/29/2022    Synovial cyst of elbow    Personal history of other diseases of the circulatory system 12/09/2020    History of  "hypertension        Past Surgical History:   Procedure Laterality Date    MR HEAD ANGIO WO IV CONTRAST  11/29/2020    MR HEAD ANGIO WO IV CONTRAST 11/29/2020 [facility]    MR NECK ANGIO WO IV CONTRAST  11/29/2020    MR NECK ANGIO WO IV CONTRAST 11/29/2020 [facility]    OTHER SURGICAL HISTORY  12/09/2020    Rotator cuff repair    OTHER SURGICAL HISTORY  12/09/2020    Cardiac catheterization with stent placement    OTHER SURGICAL HISTORY  12/29/2022    Lower back surgery    OTHER SURGICAL HISTORY  08/31/2021    Arm surgery        Family History   Problem Relation Name Age of Onset    Diabetes Mother      Lung cancer Mother      Coronary artery disease Father          Allergies   Allergen Reactions    Codeine Unknown     Gastrointestinal upset         Objective     Visit Vitals  /78   Pulse 65   Resp 16   Ht 1.651 m (5' 5\")   Wt 67.4 kg (148 lb 11.2 oz)   SpO2 97%   BMI 24.74 kg/m²   Smoking Status Every Day   BSA 1.76 m²        Physical Exam    PE:  General: Well-developed, well-nourished, no acute distress. The patient demonstrates no pain behavior, symptom magnification or overt drug-seeking behavior.  Eye: Pupils appropriate for room lighting  Neck/thyroid: No obvious goiter or enlargement of neck noted  Respiratory exam: Normal respiratory effort, unlabored respiration. No accessory muscle use noted  Cardiac exam: Bilateral radial pulses intact  Abdominal: Nondistended  Spine, lumbar: The patient is able to rise from a seated to standing position without hesitancy, push off, or delay. Gait is slow and deliberate.  Posture is forward leaning.  Chronic elevation of left hip and left shoulder.  Tenderness to paraspinous musculature throughout lumbar region.  Flexion and extension limited with extension exacerbating symptoms to low back.   Neurologic exam: Muscle strength is antigravity in all 4 extremities.  Equal muscle strength bilateral lower extremities 5/5.  Psychiatric exam: Judgment " and insight normal, affect normal, speech is fluent, affect appropriate, demonstrating no signs of hypersomnolence, sedation, or confusion        Assessment/Plan   Problem List Items Addressed This Visit             ICD-10-CM    Degeneration of intervertebral disc of lumbar region M51.36    Spinal stenosis of lumbosacral region M48.07       63-year-old male with history of lumbar discectomy in November 2022 which provided limited relief from lumbar radicular symptoms.  Presents for symptoms consistent with lumbar postlaminectomy syndrome.  Previously on opiates which were discontinued.  Patient would like to manage pain without opiate medication.  Tolerating increased dose of gabapentin to 600 mg at bedtime.  Prescribed Robaxin for muscle tightness and spasms which patient takes intermittently.  He does state he receives relief when he takes methocarbamol.  He completed a formal course of physical therapy including aqua therapy with limited relief.  Previously reviewed lumbar x-ray consistent with mild multilevel spondylosis.  Patient proceeded with reevaluation by a new Ortho/spine surgery with recommendation for alcohol and smoking cessation prior to any consideration of further surgery.  Recommending he may consider epidural steroid injections.  Presents at today's office visit with symptoms consistent with lumbar stenosis/lumbar postlaminectomy syndrome.  Patient states that leg cramping and pain is most bothersome which keeps him awake at night.  He has been using his methocarbamol, however, intermittently.  Recommended that he take his methocarbamol at bedtime to help with leg cramping and pain.  Patient also feels that gabapentin has been beneficial.  We will increase his gabapentin dose to 900 mg at bedtime.  We may further increase in the future to provide additional neuropathic pain relief.  We can discuss proceeding with epidural steroid injections.  Before we would proceed with injections it would be  beneficial to repeat the patient's lumbar MRI as it has been 2 years since his last MRI.  Patient would like to try increased dose of gabapentin at this time.  We will discuss further imaging and injections if he does not receive adequate relief with medication.  Advised patient to return to his PCP and discuss options for smoking cessation as this will potentially provide additional pain relief especially for lower extremity pain/cramping.  Also advised patient to discuss treatment options to assist with decreasing alcohol consumption.  The patient states he will return to his PCP for further discussion.  He states that he does not want to use a nicotine patch which has been offered in the past.  He would like to discuss other treatment options for smoking cessation.  Plan discussed with patient at today's office visit.    -Gabapentin to 900 mg at bedtime.  The patient was counseled on the risks and potential side effects of gabapentin as well as its importance for dosage titration. Side effects included but were not limited to, drowsiness, sedation, cognitive decline, peripheral edema, weight gain, seizures, with abrupt withdrawal.  Patient was instructed to call the office with any concerns or side effects before abruptly stopping medication.   -Continue Robaxin 500 mg daily for muscle tightness and spasm.  Encouraged the patient to take this medication at bedtime when muscle spasm/cramping is most intense.  -Patient will follow-up with his PCP to discuss both alcohol and smoking cessation.  -Further discussion about potential injections including lumbar epidural steroid injections at his next office visit.  If patient would like to proceed with injections we will repeat lumbar MRI at that time.  -Patient will follow-up in 6 months or sooner if needed.  Advised patient to notify our office if symptoms do not improve or increase prior to his next visit.          Relevant Medications    gabapentin (Neurontin) 300  mg capsule     Other Visit Diagnoses         Codes    Muscle spasm    -  Primary M62.838    Relevant Medications    methocarbamol (Robaxin) 500 mg tablet

## 2024-04-30 DIAGNOSIS — E78.5 HYPERLIPIDEMIA, UNSPECIFIED HYPERLIPIDEMIA TYPE: Primary | ICD-10-CM

## 2024-04-30 PROBLEM — Z72.0 TOBACCO USE: Status: ACTIVE | Noted: 2024-04-30

## 2024-04-30 RX ORDER — EZETIMIBE 10 MG/1
10 TABLET ORAL DAILY
Qty: 90 TABLET | Refills: 3 | Status: SHIPPED | OUTPATIENT
Start: 2024-04-30

## 2024-04-30 NOTE — PROGRESS NOTES
[facility] Heart and Vascular Cardiology    Patient Name: Isael Haji  Patient : 1960      Scribe Attestation  By signing my name below, ILu Scribe   attest that this documentation has been prepared under the direction and in the presence of Cassius Jeffers DO.      Reason for visit:  This is a 63-year-old male here for follow-up regarding his history of coronary artery disease as seen on prior cardiac catheterization, hypertension, dyslipidemia, COPD/tobacco use.     HPI:  This is a 63-year-old male here for follow-up regarding his history of coronary artery disease as seen on prior cardiac catheterization, hypertension, dyslipidemia, COPD/tobacco use.  The patient was last seen by me in May 2023.  At that visit he was doing reasonably well and asked that he follow-up in 1 year.  CMP done in 2023 showed normal serum sodium and potassium with a serum creatinine of 0.92, ALT was 49, AST was 53, serum magnesium was 2.0, CBC showed a hemoglobin of 13.1.  Lipid panel done in 2023 showed an LDL cholesterol 48 and triglycerides of 41 while on atorvastatin 80 mg daily and Zetia 10 mg daily.  CT scan of the lungs done in 2023 showed severe coronary artery calcification. ECG done today showed sinus rhythm with a heart rate of 72 bpm.  The patient reports that he has been feeling generally well from the cardiac standpoint. He denies any new chest pain, shortness of breath, palpitations and lightheadedness. She states that he is currently smoking cigarettes. He also reports that he had been getting elevated blood pressure readings as high as 155/113. He states that he takes all of his medications as prescribed. During my exam, he was resting comfortably on the exam table.                 Assessment/Plan:   1. Coronary artery disease  Patient has a history of coronary artery disease as seen on prior cardiac catheterization.   CT scan of the lungs done in 2023  showed severe coronary artery calcification.   ECG done today showed  sinus rhythm with a heart rate of 72 bpm.    He denies anginal chest discomfort.  Blood pressure appears controlled on exam today.  He  reports that he had been getting elevated blood pressure readings as high as 155/113.   I will increase amlodipine to 10 mg daily.  He should continue his current antihypertensive medications and antiplatelet therapy.  Echocardiogram done 11/30/2020 showed normal left ventricular size and systolic function with an ejection fraction of 65%, normal right ventricular size and systolic function, and no significant valve abnormalities.  Recent lab works as noted in the HPI.   Lipid panel done in June 2023 showed an LDL cholesterol 48 and triglycerides of 41 while on atorvastatin 80 mg daily and Zetia 10 mg daily.   Lab works as noted below will be updated in 1 month.  Please see lifestyle recommendations below.  Follow up in 1 year and sooner if necessary.      2. Hypertension  Patient has a history of hypertension which appears controlled on exam today.  He  reports that he had been getting elevated blood pressure readings as high as 155/113.   I will increase amlodipine to 10 mg daily.  He should continue his current antihypertensive medications.      3. Dyslipidemia   Lipid panel done in June 2023 showed an LDL cholesterol 48 and triglycerides of 41 while on atorvastatin 80 mg daily and Zetia 10 mg daily.   Lipid panel will be updated in 1 month.  Please see lifestyle recommendations below.     4. COPD/tobacco use  Patient is a current smoker.  I discussed with him the importance of smoking cessation as well as several strategies to assist him in quitting smoking.         Orders:   Increase amlodipine to 10 mg daily.  CMP/lipid/magnesium/CBC in 1 month,   Follow-up in 1 year.    Lifestyle Recommendations  I recommend a whole-food plant-based diet, an eating pattern that encourages the consumption of unrefined plant  foods (such as fruits, vegetables, tubers, whole grains, legumes, nuts and seeds) and discourages meats, dairy products, eggs and processed foods.     The AHA/ACC recommends that the patient consume a dietary pattern that emphasizes intake of vegetables, fruits, and whole grains; includes low-fat dairy products, poultry, fish, legumes, non-tropical vegetable oils, and nuts; and limits intake of sodium, sweets, sugar-sweetened beverages, and red meats.  Adapt this dietary pattern to appropriate calorie requirements (a 500-750 kcal/day deficit to loose weight), personal and cultural food preferences, and nutrition therapy for other medical conditions (including diabetes).  Achieve this pattern by following plans such as the [place] Mediterranean, DASH dietary pattern, or AHA diet.     Engage in 2 hours and 30 minutes per week of moderate-intensity physical activity, or 1 hour and 15 minutes (75 minutes) per week of vigorous-intensity aerobic physical activity, or an equivalent combination of moderate and vigorous-intensity aerobic physical activity. Aerobic activity should be performed in episodes of at least 10 minutes preferably spread throughout the week.     Adhering to a heart healthy diet, regular exercise habits, avoidance of tobacco products, and maintenance of a healthy weight are crucial components of their heart disease risk reduction.     Any positive review of systems not specifically addressed in the office visit today should be evaluated and treated by the patients primary care physician or in an emergency department if necessary     Patient was notified that results from ordered tests will be called to the patient if it changes current management; it will otherwise be discussed at a future appointment and available on [facility].     Thank you for allowing me to participate in the care of this patient.        This document was generated using the assistance of voice recognition software. If there are any  errors of spelling, grammar, syntax, or meaning; please feel free to contact me directly for clarification.    Past Medical History:  He has a past medical history of Long term (current) use of opiate analgesic (05/04/2022), Other bursal cyst, unspecified elbow (04/29/2022), and Personal history of other diseases of the circulatory system (12/09/2020).    Past Surgical History:  He has a past surgical history that includes Other surgical history (12/09/2020); Other surgical history (12/09/2020); Other surgical history (12/29/2022); Other surgical history (08/31/2021); MR angio head wo IV contrast (11/29/2020); and MR angio neck wo IV contrast (11/29/2020).      Social History:  He reports that he has been smoking cigarettes. He has never used smokeless tobacco. He reports current alcohol use of about 60.0 standard drinks of alcohol per week. He reports that he does not use drugs.    Family History:  Family History   Problem Relation Name Age of Onset    Diabetes Mother      Lung cancer Mother      Coronary artery disease Father          Allergies:  Codeine    Outpatient Medications:  Current Outpatient Medications   Medication Instructions    albuterol 90 mcg/actuation inhaler 2 puffs, inhalation, Every 4 hours PRN    amLODIPine (NORVASC) 5 mg, oral, Daily    aspirin 81 mg, oral, Daily, Chew and swallow.    atorvastatin (LIPITOR) 80 mg, oral, Daily    carvedilol (COREG) 6.25 mg, oral, 2 times daily with meals    ezetimibe (ZETIA) 10 mg, oral, Daily    gabapentin (NEURONTIN) 900 mg, oral, Nightly    lisinopril 40 mg, oral, Daily    methocarbamol (ROBAXIN) 500 mg, oral, Daily PRN    miscellaneous medical supply (Blood Pressure Cuff) misc 1 Units, miscellaneous, Daily    sildenafil (VIAGRA) 100 mg, oral, Daily PRN    umeclidinium-vilanteroL (Anoro Ellipta) 62.5-25 mcg/actuation blister with device 1 puff, inhalation, Daily        ROS:  A 14 point review of systems was done and is negative other than as stated in  "HPI    Vitals:      6/5/2023     8:26 AM 8/8/2023     8:15 AM 9/12/2023     8:08 AM 9/15/2023     9:05 AM 10/9/2023     8:53 AM 12/12/2023     8:30 AM 4/8/2024     8:08 AM   Vitals   Systolic 132 133 121 118 111 121 120   Diastolic 76 86 81 72 75 75 78   Heart Rate 68 70 74 65 59 79 65   Temp   35.9 °C (96.7 °F) 37 °C (98.6 °F)  37.2 °C (98.9 °F)    Resp  16 16 16 16 16 16   Height (in) 1.651 m (5' 5\") 1.651 m (5' 5\") 1.664 m (5' 5.5\")  1.651 m (5' 5\") 1.651 m (5' 5\") 1.651 m (5' 5\")   Weight (lb) 147 145 142 143.6 144.9 146 148.7   BMI 24.46 kg/m2 24.13 kg/m2 23.27 kg/m2 23.53 kg/m2 24.11 kg/m2 24.3 kg/m2 24.74 kg/m2   BSA (m2) 1.75 m2 1.74 m2 1.73 m2 1.73 m2 1.74 m2 1.74 m2 1.76 m2        Physical Exam:   Constitutional: Cooperative, in no acute distress, alert, appears stated age.  Skin: Skin color, texture, turgor normal. No rashes or lesions.  Head: Normocephalic. No masses, lesions, tenderness or abnormalities  Eyes: Extraocular movements are grossly intact.  Mouth and throat: Mucous membranes moist  Neck: Neck supple, no carotid bruits, no JVD  Respiratory: Lungs clear to auscultation, no wheezing or rhonchi, no use of accessory muscles  Chest wall: No scars, normal excursion with respiration  Cardiovascular: Regular rhythm without murmur, gallop, or rubs  Gastrointestinal: Abdomen soft, nontender. Bowel sounds normal.  Musculoskeletal: Strength equal in upper extremities  Extremities: No pitting edema  Neurologic: Sensation grossly intact, alert and oriented x3    Intake/Output:   No intake/output data recorded.    Outpatient Medications  Current Outpatient Medications on File Prior to Visit   Medication Sig Dispense Refill    albuterol 90 mcg/actuation inhaler Inhale 2 puffs every 4 hours if needed.      amLODIPine (Norvasc) 5 mg tablet Take 1 tablet (5 mg) by mouth once daily. 90 tablet 3    aspirin 81 mg chewable tablet Chew 1 tablet (81 mg) once daily. Chew and swallow.      atorvastatin (Lipitor) 80 mg " tablet Take 1 tablet (80 mg) by mouth once daily. 90 tablet 3    carvedilol (Coreg) 6.25 mg tablet Take 1 tablet (6.25 mg) by mouth 2 times a day with meals.      ezetimibe (Zetia) 10 mg tablet Take 1 tablet (10 mg) by mouth once daily.      gabapentin (Neurontin) 300 mg capsule Take 3 capsules (900 mg) by mouth once daily at bedtime. 90 capsule 3    lisinopril 40 mg tablet TAKE ONE TABLET BY MOUTH DAILY 90 tablet 0    methocarbamol (Robaxin) 500 mg tablet Take 1 tablet (500 mg) by mouth once daily as needed for muscle spasms. 30 tablet 3    miscellaneous medical supply (Blood Pressure Cuff) misc 1 Units once daily. 1 each 0    sildenafil (Viagra) 100 mg tablet Take 1 tablet (100 mg) by mouth once daily as needed for erectile dysfunction. 10 tablet 3    umeclidinium-vilanteroL (Anoro Ellipta) 62.5-25 mcg/actuation blister with device Inhale 1 puff once daily.       No current facility-administered medications on file prior to visit.       Labs: (past 26 weeks)  Recent Results (from the past 4368 hour(s))   CBC    Collection Time: 11/13/23  3:32 PM   Result Value Ref Range    WBC 7.8 4.4 - 11.3 x10*3/uL    nRBC 0.0 0.0 - 0.0 /100 WBCs    RBC 4.05 (L) 4.50 - 5.90 x10*6/uL    Hemoglobin 13.1 (L) 13.5 - 17.5 g/dL    Hematocrit 40.2 (L) 41.0 - 52.0 %    MCV 99 80 - 100 fL    MCH 32.3 26.0 - 34.0 pg    MCHC 32.6 32.0 - 36.0 g/dL    RDW 13.3 11.5 - 14.5 %    Platelets 214 150 - 450 x10*3/uL   Comprehensive metabolic panel    Collection Time: 11/13/23  3:32 PM   Result Value Ref Range    Glucose 69 (L) 74 - 99 mg/dL    Sodium 140 136 - 145 mmol/L    Potassium 4.1 3.5 - 5.3 mmol/L    Chloride 103 98 - 107 mmol/L    Bicarbonate 30 21 - 32 mmol/L    Anion Gap 11 10 - 20 mmol/L    Urea Nitrogen 18 6 - 23 mg/dL    Creatinine 0.92 0.50 - 1.30 mg/dL    eGFR >90 >60 mL/min/1.73m*2    Calcium 10.1 8.6 - 10.3 mg/dL    Albumin 4.6 3.4 - 5.0 g/dL    Alkaline Phosphatase 87 33 - 136 U/L    Total Protein 6.8 6.4 - 8.2 g/dL    AST 53 (H)  9 - 39 U/L    Bilirubin, Total 0.5 0.0 - 1.2 mg/dL    ALT 49 10 - 52 U/L   Magnesium    Collection Time: 11/13/23  3:32 PM   Result Value Ref Range    Magnesium 2.00 1.60 - 2.40 mg/dL   Prostate Specific Antigen    Collection Time: 11/13/23  3:32 PM   Result Value Ref Range    Prostate Specific AG 2.53 <=4.00 ng/mL   POCT UA Automated manually resulted    Collection Time: 01/03/24 10:23 AM   Result Value Ref Range    POC Glucose, Urine NEGATIVE NEGATIVE mg/dl    POC Bilirubin, Urine NEGATIVE NEGATIVE    POC Ketones, Urine NEGATIVE NEGATIVE mg/dl    POC Specific Gravity, Urine 1.020 1.005 - 1.035    POC Blood, Urine NEGATIVE NEGATIVE    POC PH, Urine 5.5 No Reference Range Established PH    POC Protein, Urine NEGATIVE NEGATIVE, 30 (1+) mg/dl    POC Urobilinogen, Urine 0.2 0.2, 1.0 EU/DL    Poc Nitrite, Urine NEGATIVE NEGATIVE    POC Leukocytes, Urine NEGATIVE NEGATIVE       ECG  No results found for this or any previous visit (from the past 4464 hour(s)).    Echocardiogram  No results found for this or any previous visit from the past 1095 days.      CV Studies:  EKG:No results found for this or any previous visit (from the past 4464 hour(s)).  Echocardiogram:   E[place]ocardi[place] [address]  Phone [phone] Fax [phone]    TRANSTHORACIC ECHOCARDIOGRAM REPORT      Patient Name:     COLLIN FREY        [place] Physician:   [id number] Hany Dias MD  Study Date:       11/30/2020          Referring Physician: George Hammond  MRN/PID:          [id number]            PCP:  Accession/Order#: [id number]           Department Location: [facility] Echo Lab  YOB: 1960            Fellow:  Gender:           M                   Nurse:               Monserrat Llanos RN  Admit Date:       11/28/2020          Sonographer:         Siomraa Munoz RDCS  Admission Status: Inpatient - Routine Additional Staff:  Height:           165.10 cm           CC Report  to:  Weight:           53.52 kg            Study Type:          Echocardiogram  BSA:              1.58 m2  Blood Pressure: 167 /86 mmHg    Diagnosis/ICD: G45.8-Other transient cerebral ischemic attacks and related  syndromes  Indication:    Stroke  Procedure/CPT: Echo Complete w Full [id number]    Patient History:  Pertinent History: Stent (2007).    Study Detail: The following Echo studies were performed: 2D, M-Mode, Doppler and  color flow. Agitated saline used as a contrast agent for  intraseptal flow evaluation.      PHYSICIAN INTERPRETATION:  Left Ventricle: The left ventricular systolic function is normal. The calculated ejection fraction is normal at 65 % using the Esparza's Bi-plane MOD calculation. There are no regional wall motion abnormalities. The left ventricular cavity size is normal. The left ventricular septal wall thickness is normal. There is normal left ventricular posterior wall thickness. Spectral Doppler shows an impaired relaxation pattern of left ventricular diastolic filling.  Left Atrium: The left atrium is normal in size. A bubble study using agitated saline was performed. Bubble study is negative.  Right Ventricle: The right ventricle is normal in size. There is normal right ventricular global systolic function.  Right Atrium: The right atrium is normal in size.  Aortic Valve: The aortic valve is trileaflet. There is no evidence of aortic valve stenosis.  There is no evidence of aortic valve regurgitation. The peak instantaneous gradient of the aortic valve is 11.6 mmHg. The mean gradient of the aortic valve is 6.0 mmHg.  Mitral Valve: The mitral valve is normal in structure. There is no evidence of mitral valve stenosis. There is trace mitral valve regurgitation.  Tricuspid Valve: The tricuspid valve is structurally normal. There is trace tricuspid regurgitation. The Doppler estimated RVSP is within normal limits at 27.6 mmHg.  Pulmonic Valve: The pulmonic valve is structurally  normal. There is physiologic pulmonic valve regurgitation.  Pericardium: There is no pericardial effusion noted.  Aorta: The aortic root is normal.  Pulmonary Artery: The pulmonary artery is normal in size.  Pulmonary Veins: There is no evidence of pulmonary vein flow reversal.  Systemic Veins: The inferior vena cava appears to be of normal size. There is IVC inspiratory collapse greater than 50%.      CONCLUSIONS:  1. The left ventricular systolic function is normal.  2. Spectral Doppler shows an impaired relaxation pattern of left ventricular diastolic filling.  3. RVSP within normal limits.  4. Aortic valve stenosis is not present.    QUANTITATIVE DATA SUMMARY:  2D MEASUREMENTS:  Normal Ranges:  Ao Root d:     3.30 cm   (2.0-3.7cm)  LAs:           2.80 cm   (2.7-4.0cm)  IVSd:          0.81 cm   (0.6-1.1cm)  LVPWd:         0.81 cm   (0.6-1.1cm)  LVIDd:         4.83 cm   (3.9-5.9cm)  LVIDs:         3.36 cm  LV Mass Index: 82.4 g/m2  LV % FS        30.4 %    LA VOLUME:  Normal Ranges:  LA Vol A4C:        52.6 ml    (22+/-6mL/m2)  LA Vol A2C:        39.0 ml  LA Vol BP:         47.8 ml  LA Vol Index A4C:  33.3ml/m2  LA Vol Index A2C:  24.7 ml/m2  LA Vol Index BP:   30.2 ml/m2  LA Area A4C:       16.5 cm2  LA Area A2C:       15.0 cm2  LA Major Axis A4C: 4.4 cm  LA Major Axis A2C: 4.9 cm  LA Volume Index:   29.0 ml/m2    RA VOLUME BY A/L METHOD:  Normal Ranges:  RA Area A4C: 13.3 cm2    AORTA MEASUREMENTS:  Normal Ranges:  Ao Sinus, d: 3.30 cm (2.1-3.5cm)  Ao STJ, d:   2.60 cm (1.7-3.4cm)    LV SYSTOLIC FUNCTION BY 2D PLANIMETRY (MOD):  Normal Ranges:  EF-A4C View: 66.9 % (>55%)  EF-A2C View: 64.2 %  EF-Biplane:  65.4 %    LV DIASTOLIC FUNCTION:  Normal Ranges:  MV Peak E:        0.73 m/s    (0.7-1.2 m/s)  MV Peak A:        0.68 m/s    (0.42-0.7 m/s)  E/A Ratio:        1.07        (1.0-2.2)  MV e'             0.08 m/s    (>8.0)  MV lateral e'     0.10 m/s  MV medial e'      0.07 m/s  MV A Dur:         113.00 msec  E/e'  Ratio:       8.56        (<8.0)  PulmV A Revs Dur: 155.00 msec    AORTIC VALVE:  Normal Ranges:  AoV Vmax:                1.70 m/s  (<1.7m/s)  AoV Peak P.6 mmHg (<20mmHg)  AoV Mean P.0 mmHg  (1.7-11.5mmHg)  LVOT Max Eloy:            1.07 m/s  (<1.1m/s)  AoV VTI:                 38.00 cm  (18-25cm)  LVOT VTI:                22.50 cm  LVOT Diameter:           1.70 cm   (1.8-2.4cm)  AoV Area, VTI:           1.34 cm2  (2.5-5.5cm2)  AoV Area,Vmax:           1.43 cm2  (2.5-4.5cm2)  AoV Dimensionless Index: 0.59    RIGHT VENTRICLE:  RV 1   2.7 cm  RV 2   2.0 cm  RV 3   5.6 cm  TAPSE: 25.6 mm  RV s'  0.14 m/s    TRICUSPID VALVE/RVSP:  Normal Ranges:  Peak TR Velocity: 2.48 m/s  RV Syst Pressure: 27.6 mmHg (< 30mmHg)  TV E Vmax:        0.34 m/s  (0.3-0.7m/s)  TV A Vmax:        0.33 m/s  IVC Diam:         1.20 cm    Pulmonary Veins:  PulmV A Revs Dur: 155.00 msec      [id number] Hany Dias MD  Electronically signed on 2020 at 3:32:50 PM         Final     Stress Testing IMGRESULT([id number]):   NM CARDIAC STRESS REST (MYOCARDIAL PERFUSION MIBI) 2022    Narrative  MRN: [id number]  Patient Name: COLLIN FREY    STUDY:  CARDIAC STRESS/REST INJECTION;  3/21/2022 9:16 am    INDICATION:  SOBOE  I25.10: Athscl heart disease of native coronary artery w/o ang  pctrs J44.9: Chronic obstructive pulmonary disease, unspecified COPD  type I10: Essential hypertension.    COMPARISON:  None.    ACCESSION NUMBER(S):  [id number]    ORDERING CLINICIAN:  MAYKEL BOWEN    TECHNIQUE:  DIVISION OF NUCLEAR MEDICINE  PHARMACOLOGIC STRESS MYOCARDIAL PERFUSION SCAN, ONE DAY PROTOCOL    The patient received an intravenous dose of  11.2 mCi of Tc-99m  tetrofosmin and resting emission tomographic (SPECT) images of the  myocardium were acquired. The patient then received an intravenous  infusion of 0.4mg regadenoson (Lexiscan) followed by an additional  dose of  33.4 mCi of Tc-99m tetrofosmin. Stress phase SPECT images  of  the myocardium were then acquired. These included ECG-gated images to  assess and quantify ventricular function.    FINDINGS:  Stress and rest images both demonstrate a normal distribution of  perfusion throughout all LV segments with no sign of ischemia.    ECG-gated images demonstrate normal LV size and myocardial  contractility with an LV ejection fraction of   67 % (normal above 50  percent). Summed stress score is 0 summed rest score 0 summed  difference score 0.    Impression  1. Normal stress myocardial perfusion imaging in response to  pharmacologic stress.  2. Well-maintained left ventricular function.    Cardiac Catheterization: No results found for this or any previous visit from the past 1825 days.  No results found for this or any previous visit from the past 3650 days.     Cardiac Scoring: No results found for this or any previous visit from the past 1825 days.    AAA : No results found for this or any previous visit from the past 1825 days.    OTHER: No results found for this or any previous visit from the past 1825 days.    LAST IMAGING RESULTS  Pulmonary function test  Past expiration spirometry demonstrates a mild obstructive ventilatory defect.  Spirograms are good quality and plateau gradually.  The flow volume loop reveals decreased flow at all lung volumes consistent with airflow obstruction.The FEV1 is 3.07 L or 110% of predicted.The FEV1 to FVC ratio is 67 which is at the lower limit of normal.Lung volumes are determined by body plethysmography.  The total lung capacity is normal at 108% of predicted.The diffusing capacity is at the lower limit of normal at 78% of predicted.Impression:#1.  Mild airflow obstruction but at the lower limit of normal.#2.  Normal lung volumes#3.  Diffusing capacity within the normal range.    Problem List Items Addressed This Visit       Atherosclerotic heart disease of native coronary artery without angina pectoris - Primary    Chronic obstructive pulmonary  disease, unspecified (Multi)    Overview     2 PPD smoker x 50 years           Essential hypertension    Hyperlipidemia    Tobacco use        Cassius Jeffers DO, FACC, FACOI

## 2024-05-06 ENCOUNTER — OFFICE VISIT (OUTPATIENT)
Dept: CARDIOLOGY | Facility: CLINIC | Age: 64
End: 2024-05-06
Payer: COMMERCIAL

## 2024-05-06 VITALS
BODY MASS INDEX: 24.89 KG/M2 | HEART RATE: 72 BPM | WEIGHT: 149.4 LBS | DIASTOLIC BLOOD PRESSURE: 72 MMHG | HEIGHT: 65 IN | SYSTOLIC BLOOD PRESSURE: 130 MMHG

## 2024-05-06 DIAGNOSIS — I10 ESSENTIAL HYPERTENSION: ICD-10-CM

## 2024-05-06 DIAGNOSIS — E78.00 PURE HYPERCHOLESTEROLEMIA: ICD-10-CM

## 2024-05-06 DIAGNOSIS — Z72.0 TOBACCO USE: ICD-10-CM

## 2024-05-06 DIAGNOSIS — J44.9 CHRONIC OBSTRUCTIVE PULMONARY DISEASE, UNSPECIFIED COPD TYPE (MULTI): ICD-10-CM

## 2024-05-06 DIAGNOSIS — I25.10 ATHEROSCLEROSIS OF NATIVE CORONARY ARTERY OF NATIVE HEART WITHOUT ANGINA PECTORIS: Primary | ICD-10-CM

## 2024-05-06 PROCEDURE — 3075F SYST BP GE 130 - 139MM HG: CPT | Performed by: INTERNAL MEDICINE

## 2024-05-06 PROCEDURE — 99214 OFFICE O/P EST MOD 30 MIN: CPT | Performed by: INTERNAL MEDICINE

## 2024-05-06 PROCEDURE — 93000 ELECTROCARDIOGRAM COMPLETE: CPT | Performed by: INTERNAL MEDICINE

## 2024-05-06 PROCEDURE — 3078F DIAST BP <80 MM HG: CPT | Performed by: INTERNAL MEDICINE

## 2024-05-06 RX ORDER — AMLODIPINE BESYLATE 10 MG/1
10 TABLET ORAL DAILY
Qty: 90 TABLET | Refills: 3 | Status: SHIPPED | OUTPATIENT
Start: 2024-05-06 | End: 2025-05-06

## 2024-05-14 ENCOUNTER — LAB (OUTPATIENT)
Dept: LAB | Facility: LAB | Age: 64
End: 2024-05-14
Payer: COMMERCIAL

## 2024-05-14 DIAGNOSIS — E78.00 PURE HYPERCHOLESTEROLEMIA: ICD-10-CM

## 2024-05-14 DIAGNOSIS — J44.9 CHRONIC OBSTRUCTIVE PULMONARY DISEASE, UNSPECIFIED COPD TYPE (MULTI): ICD-10-CM

## 2024-05-14 DIAGNOSIS — I25.10 ATHEROSCLEROSIS OF NATIVE CORONARY ARTERY OF NATIVE HEART WITHOUT ANGINA PECTORIS: ICD-10-CM

## 2024-05-14 DIAGNOSIS — Z72.0 TOBACCO USE: ICD-10-CM

## 2024-05-14 DIAGNOSIS — I10 ESSENTIAL HYPERTENSION: ICD-10-CM

## 2024-05-14 LAB
ALBUMIN SERPL BCP-MCNC: 4.6 G/DL (ref 3.4–5)
ALP SERPL-CCNC: 68 U/L (ref 33–136)
ALT SERPL W P-5'-P-CCNC: 87 U/L (ref 10–52)
ANION GAP SERPL CALC-SCNC: 14 MMOL/L (ref 10–20)
AST SERPL W P-5'-P-CCNC: 85 U/L (ref 9–39)
BILIRUB SERPL-MCNC: 0.7 MG/DL (ref 0–1.2)
BUN SERPL-MCNC: 11 MG/DL (ref 6–23)
CALCIUM SERPL-MCNC: 9.5 MG/DL (ref 8.6–10.3)
CHLORIDE SERPL-SCNC: 99 MMOL/L (ref 98–107)
CHOLEST SERPL-MCNC: 133 MG/DL (ref 0–199)
CHOLESTEROL/HDL RATIO: 2.8
CO2 SERPL-SCNC: 25 MMOL/L (ref 21–32)
CREAT SERPL-MCNC: 0.75 MG/DL (ref 0.5–1.3)
EGFRCR SERPLBLD CKD-EPI 2021: >90 ML/MIN/1.73M*2
ERYTHROCYTE [DISTWIDTH] IN BLOOD BY AUTOMATED COUNT: 13.7 % (ref 11.5–14.5)
GLUCOSE SERPL-MCNC: 79 MG/DL (ref 74–99)
HCT VFR BLD AUTO: 39.2 % (ref 41–52)
HDLC SERPL-MCNC: 48.3 MG/DL
HGB BLD-MCNC: 13.6 G/DL (ref 13.5–17.5)
LDLC SERPL CALC-MCNC: 69 MG/DL
MAGNESIUM SERPL-MCNC: 1.91 MG/DL (ref 1.6–2.4)
MCH RBC QN AUTO: 32.9 PG (ref 26–34)
MCHC RBC AUTO-ENTMCNC: 34.7 G/DL (ref 32–36)
MCV RBC AUTO: 95 FL (ref 80–100)
NON HDL CHOLESTEROL: 85 MG/DL (ref 0–149)
NRBC BLD-RTO: 0 /100 WBCS (ref 0–0)
PLATELET # BLD AUTO: 191 X10*3/UL (ref 150–450)
POTASSIUM SERPL-SCNC: 4.9 MMOL/L (ref 3.5–5.3)
PROT SERPL-MCNC: 7 G/DL (ref 6.4–8.2)
RBC # BLD AUTO: 4.13 X10*6/UL (ref 4.5–5.9)
SODIUM SERPL-SCNC: 133 MMOL/L (ref 136–145)
TRIGL SERPL-MCNC: 78 MG/DL (ref 0–149)
VLDL: 16 MG/DL (ref 0–40)
WBC # BLD AUTO: 6.1 X10*3/UL (ref 4.4–11.3)

## 2024-05-14 PROCEDURE — 80053 COMPREHEN METABOLIC PANEL: CPT

## 2024-05-14 PROCEDURE — 85027 COMPLETE CBC AUTOMATED: CPT

## 2024-05-14 PROCEDURE — 80061 LIPID PANEL: CPT

## 2024-05-14 PROCEDURE — 83735 ASSAY OF MAGNESIUM: CPT

## 2024-05-14 PROCEDURE — 36415 COLL VENOUS BLD VENIPUNCTURE: CPT

## 2024-05-15 ENCOUNTER — TELEPHONE (OUTPATIENT)
Dept: CARDIOLOGY | Facility: CLINIC | Age: 64
End: 2024-05-15
Payer: COMMERCIAL

## 2024-05-15 DIAGNOSIS — I25.10 ATHEROSCLEROSIS OF NATIVE CORONARY ARTERY OF NATIVE HEART WITHOUT ANGINA PECTORIS: ICD-10-CM

## 2024-05-15 DIAGNOSIS — E78.2 MIXED HYPERLIPIDEMIA: ICD-10-CM

## 2024-05-15 DIAGNOSIS — I25.119 CORONARY ARTERY DISEASE INVOLVING NATIVE HEART WITH ANGINA PECTORIS, UNSPECIFIED VESSEL OR LESION TYPE (CMS-HCC): ICD-10-CM

## 2024-05-15 RX ORDER — ATORVASTATIN CALCIUM 80 MG/1
40 TABLET, FILM COATED ORAL DAILY
COMMUNITY
Start: 2024-05-15

## 2024-05-15 NOTE — TELEPHONE ENCOUNTER
----- Message from Cassius Jeffers DO sent at 5/14/2024  1:52 PM EDT -----  Please call and inform the patient that blood work shows mildly elevated LFTs.  Please have the patient reduce atorvastatin to 40 mg daily.

## 2024-05-15 NOTE — TELEPHONE ENCOUNTER
5/15/24  1148  Called results to patient and plan for reducing Lipitor to 40 mg. Patient verbalized understanding of results and is agreeable to plan.      Medication profile updated to reflect change.      5/15/24  1056  Called patient; no answer. Left voice message for patient to return call for results and directives from Dr. Jeffers.      ----- Message from Cassius Jeffers DO sent at 5/14/2024  1:52 PM EDT -----  Please call and inform the patient that blood work shows mildly elevated LFTs.  Please have the patient reduce atorvastatin to 40 mg daily.

## 2024-06-05 ENCOUNTER — TELEPHONE (OUTPATIENT)
Dept: PRIMARY CARE | Facility: CLINIC | Age: 64
End: 2024-06-05
Payer: COMMERCIAL

## 2024-06-05 DIAGNOSIS — M48.07 SPINAL STENOSIS OF LUMBOSACRAL REGION: ICD-10-CM

## 2024-06-05 NOTE — TELEPHONE ENCOUNTER
Pt called in and stated he is waiting on his 600 mg gabapentin to be filled and is currently out. Pt normally takes 600 mg and a 300 mg for a total of 900 mg. Pt is wanting to know if he can take 3 of the 300 mg capsules until his 600 mg is filled? Please advise.

## 2024-06-06 RX ORDER — GABAPENTIN 300 MG/1
900 CAPSULE ORAL NIGHTLY
Qty: 90 CAPSULE | Refills: 3 | Status: SHIPPED | OUTPATIENT
Start: 2024-06-06 | End: 2024-10-04

## 2024-06-12 ENCOUNTER — APPOINTMENT (OUTPATIENT)
Dept: PRIMARY CARE | Facility: CLINIC | Age: 64
End: 2024-06-12
Payer: COMMERCIAL

## 2024-06-12 ENCOUNTER — HOSPITAL ENCOUNTER (OUTPATIENT)
Dept: RADIOLOGY | Facility: CLINIC | Age: 64
Discharge: HOME | End: 2024-06-12
Payer: COMMERCIAL

## 2024-06-12 VITALS
DIASTOLIC BLOOD PRESSURE: 72 MMHG | TEMPERATURE: 96.9 F | SYSTOLIC BLOOD PRESSURE: 124 MMHG | HEIGHT: 65 IN | HEART RATE: 56 BPM | BODY MASS INDEX: 24.74 KG/M2 | WEIGHT: 148.5 LBS | RESPIRATION RATE: 20 BRPM | OXYGEN SATURATION: 96 %

## 2024-06-12 DIAGNOSIS — K76.0 HEPATIC STEATOSIS: ICD-10-CM

## 2024-06-12 DIAGNOSIS — R74.8 ELEVATED LIVER ENZYMES: ICD-10-CM

## 2024-06-12 DIAGNOSIS — R22.32 MASS OF ARM, LEFT: ICD-10-CM

## 2024-06-12 DIAGNOSIS — M62.838 MUSCLE SPASM: ICD-10-CM

## 2024-06-12 DIAGNOSIS — I10 ESSENTIAL HYPERTENSION: ICD-10-CM

## 2024-06-12 DIAGNOSIS — Z72.0 TOBACCO USE: ICD-10-CM

## 2024-06-12 DIAGNOSIS — F17.200 TOBACCO DEPENDENCE: ICD-10-CM

## 2024-06-12 DIAGNOSIS — F10.20 ALCOHOLISM, CHRONIC (MULTI): ICD-10-CM

## 2024-06-12 DIAGNOSIS — Z00.00 ROUTINE GENERAL MEDICAL EXAMINATION AT HEALTH CARE FACILITY: ICD-10-CM

## 2024-06-12 DIAGNOSIS — Z00.00 ENCOUNTER FOR MEDICARE ANNUAL WELLNESS EXAM: Primary | ICD-10-CM

## 2024-06-12 PROCEDURE — 3078F DIAST BP <80 MM HG: CPT

## 2024-06-12 PROCEDURE — G0439 PPPS, SUBSEQ VISIT: HCPCS

## 2024-06-12 PROCEDURE — 3074F SYST BP LT 130 MM HG: CPT

## 2024-06-12 PROCEDURE — 73070 X-RAY EXAM OF ELBOW: CPT | Mod: LEFT SIDE | Performed by: STUDENT IN AN ORGANIZED HEALTH CARE EDUCATION/TRAINING PROGRAM

## 2024-06-12 PROCEDURE — 99396 PREV VISIT EST AGE 40-64: CPT

## 2024-06-12 PROCEDURE — 99406 BEHAV CHNG SMOKING 3-10 MIN: CPT

## 2024-06-12 PROCEDURE — 99214 OFFICE O/P EST MOD 30 MIN: CPT

## 2024-06-12 PROCEDURE — 73070 X-RAY EXAM OF ELBOW: CPT | Mod: LT

## 2024-06-12 RX ORDER — IBUPROFEN 200 MG
1 TABLET ORAL EVERY 24 HOURS
Qty: 28 PATCH | Refills: 0 | Status: SHIPPED | OUTPATIENT
Start: 2024-06-12

## 2024-06-12 RX ORDER — NICOTINE 7MG/24HR
1 PATCH, TRANSDERMAL 24 HOURS TRANSDERMAL EVERY 24 HOURS
Qty: 28 PATCH | Refills: 0 | Status: SHIPPED | OUTPATIENT
Start: 2024-08-06 | End: 2024-09-03

## 2024-06-12 RX ORDER — IBUPROFEN 200 MG
1 TABLET ORAL EVERY 24 HOURS
Qty: 28 PATCH | Refills: 0 | Status: SHIPPED | OUTPATIENT
Start: 2024-07-07 | End: 2024-08-04

## 2024-06-12 RX ORDER — DIPHENHYDRAMINE HCL 25 MG
4 CAPSULE ORAL AS NEEDED
Qty: 100 EACH | Refills: 2 | Status: SHIPPED | OUTPATIENT
Start: 2024-06-12 | End: 2024-07-12

## 2024-06-12 SDOH — ECONOMIC STABILITY: FOOD INSECURITY: WITHIN THE PAST 12 MONTHS, THE FOOD YOU BOUGHT JUST DIDN'T LAST AND YOU DIDN'T HAVE MONEY TO GET MORE.: NEVER TRUE

## 2024-06-12 SDOH — ECONOMIC STABILITY: FOOD INSECURITY: WITHIN THE PAST 12 MONTHS, YOU WORRIED THAT YOUR FOOD WOULD RUN OUT BEFORE YOU GOT MONEY TO BUY MORE.: NEVER TRUE

## 2024-06-12 ASSESSMENT — ENCOUNTER SYMPTOMS
MUSCULOSKELETAL NEGATIVE: 1
DEPRESSION: 0
CONSTITUTIONAL NEGATIVE: 1
HEMATOLOGIC/LYMPHATIC NEGATIVE: 1
LOSS OF SENSATION IN FEET: 1
CARDIOVASCULAR NEGATIVE: 1
GASTROINTESTINAL NEGATIVE: 1
RESPIRATORY NEGATIVE: 1
NEUROLOGICAL NEGATIVE: 1
EYES NEGATIVE: 1
OCCASIONAL FEELINGS OF UNSTEADINESS: 0
PSYCHIATRIC NEGATIVE: 1
ENDOCRINE NEGATIVE: 1

## 2024-06-12 ASSESSMENT — LIFESTYLE VARIABLES
SKIP TO QUESTIONS 9-10: 0
AUDIT-C TOTAL SCORE: 6
HOW OFTEN DO YOU HAVE A DRINK CONTAINING ALCOHOL: 4 OR MORE TIMES A WEEK
HOW OFTEN DO YOU HAVE SIX OR MORE DRINKS ON ONE OCCASION: NEVER
HOW MANY STANDARD DRINKS CONTAINING ALCOHOL DO YOU HAVE ON A TYPICAL DAY: 5 OR 6

## 2024-06-12 ASSESSMENT — ANXIETY QUESTIONNAIRES
4. TROUBLE RELAXING: NOT AT ALL
5. BEING SO RESTLESS THAT IT IS HARD TO SIT STILL: NOT AT ALL
2. NOT BEING ABLE TO STOP OR CONTROL WORRYING: NOT AT ALL
7. FEELING AFRAID AS IF SOMETHING AWFUL MIGHT HAPPEN: NOT AT ALL
3. WORRYING TOO MUCH ABOUT DIFFERENT THINGS: NOT AT ALL
1. FEELING NERVOUS, ANXIOUS, OR ON EDGE: NOT AT ALL
6. BECOMING EASILY ANNOYED OR IRRITABLE: NOT AT ALL
IF YOU CHECKED OFF ANY PROBLEMS ON THIS QUESTIONNAIRE, HOW DIFFICULT HAVE THESE PROBLEMS MADE IT FOR YOU TO DO YOUR WORK, TAKE CARE OF THINGS AT HOME, OR GET ALONG WITH OTHER PEOPLE: NOT DIFFICULT AT ALL
GAD7 TOTAL SCORE: 0

## 2024-06-12 ASSESSMENT — PAIN SCALES - GENERAL: PAINLEVEL: 8

## 2024-06-12 ASSESSMENT — ACTIVITIES OF DAILY LIVING (ADL)
MANAGING_FINANCES: INDEPENDENT
DOING_HOUSEWORK: INDEPENDENT
GROCERY_SHOPPING: INDEPENDENT
TAKING_MEDICATION: INDEPENDENT
DRESSING: INDEPENDENT
BATHING: INDEPENDENT

## 2024-06-12 ASSESSMENT — PATIENT HEALTH QUESTIONNAIRE - PHQ9
SUM OF ALL RESPONSES TO PHQ9 QUESTIONS 1 & 2: 0
1. LITTLE INTEREST OR PLEASURE IN DOING THINGS: NOT AT ALL
2. FEELING DOWN, DEPRESSED OR HOPELESS: NOT AT ALL

## 2024-06-12 NOTE — PROGRESS NOTES
Subjective   Patient ID: Isael Haji is a 63 y.o. male who presents for 6 months follow up.    Reports having had tender firm mass on left elbow for the past few months progressively increasing in size, has not seen a specialist for this.     Brought home blood pressure log showing blood pressures all over the place, ranging from 130-150/, in office blood pressures historically well controlled in 120/70s range.     States he was seen by an NP in [place] for ortho spine in the interim and was told he wouldn't be a candidate for surgery unless he was quit smoking and drinking.  The day after his appt, he smoked his last 4 cigarettes and reports later sleeping for the next two days and doesn't remember what happened. States multiple friends/family were checking on him but he doesn't remember anything from those two days. Currently smoking 2 PPD.      Diet: sandwiches, pork chops cooked in crock pot. Fried chicken monthly. Pork loins. Beef hamburgers and steak x3 times per month. Greenbeans. No at lot of processed foods. X2 cups of coffee per day with milk. Sweat tea 20oz bottle per day.   Exercise: No regular exercise, due to COPD  Weight: stable  Water: 1 bottle per day  Sleep: Not good due to leg cramping. Six hours of sleep.   Social: . 1 son, near by. Mobile home. Sister lives with him, has a pit bull.   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               Review of Systems   Constitutional: Negative.    HENT: Negative.     Eyes: Negative.    Respiratory: Negative.     Cardiovascular: Negative.    Gastrointestinal: Negative.    Endocrine: Negative.    Genitourinary: Negative.    Musculoskeletal: Negative.    Skin: Negative.    Neurological: Negative.    Hematological: Negative.    Psychiatric/Behavioral: Negative.          Current Outpatient Medications   Medication Sig Dispense Refill    albuterol 90 mcg/actuation inhaler Inhale 2 puffs every 4 hours if needed.      amLODIPine  (Norvasc) 10 mg tablet Take 1 tablet (10 mg) by mouth once daily. 90 tablet 3    aspirin 81 mg chewable tablet Chew 1 tablet (81 mg) once daily. Chew and swallow.      atorvastatin (Lipitor) 80 mg tablet Take 0.5 tablets (40 mg) by mouth once daily.      carvedilol (Coreg) 6.25 mg tablet Take 1 tablet (6.25 mg) by mouth 2 times daily (morning and late afternoon).      ezetimibe (Zetia) 10 mg tablet Take 1 tablet (10 mg) by mouth once daily. 90 tablet 3    gabapentin (Neurontin) 300 mg capsule Take 3 capsules (900 mg) by mouth once daily at bedtime. 90 capsule 3    lisinopril 40 mg tablet TAKE ONE TABLET BY MOUTH DAILY 90 tablet 0    miscellaneous medical supply (Blood Pressure Cuff) misc 1 Units once daily. 1 each 0    umeclidinium-vilanteroL (Anoro Ellipta) 62.5-25 mcg/actuation blister with device Inhale 1 puff once daily.      methocarbamol (Robaxin) 500 mg tablet Take 1 tablet (500 mg) by mouth once daily as needed for muscle spasms. (Patient taking differently: Take 0.5 tablets (250 mg) by mouth once daily as needed for muscle spasms.) 30 tablet 3    [START ON 7/7/2024] nicotine (Nicoderm CQ) 14 mg/24 hr patch Place 1 patch over 24 hours on the skin once every 24 hours for 28 days. Do not fill before July 7, 2024. 28 patch 0    nicotine (Nicoderm CQ) 21 mg/24 hr patch Place 1 patch over 24 hours on the skin once every 24 hours. 28 patch 0    [START ON 8/6/2024] nicotine (Nicoderm CQ) 7 mg/24 hr patch Place 1 patch over 24 hours on the skin once every 24 hours for 28 days. Do not fill before August 6, 2024. 28 patch 0    nicotine polacrilex (Nicorette) 4 mg gum Chew 1 each (4 mg) if needed for smoking cessation. 100 each 2    sildenafil (Viagra) 100 mg tablet Take 1 tablet (100 mg) by mouth once daily as needed for erectile dysfunction. 10 tablet 3     No current facility-administered medications for this visit.     Past Surgical History:   Procedure Laterality Date    MR HEAD ANGIO WO IV CONTRAST  11/29/2020     "MR HEAD ANGIO WO IV CONTRAST 11/29/2020 [facility]    MR NECK ANGIO WO IV CONTRAST  11/29/2020    MR NECK ANGIO WO IV CONTRAST 11/29/2020 [facility]    OTHER SURGICAL HISTORY  12/09/2020    Rotator cuff repair    OTHER SURGICAL HISTORY  12/09/2020    Cardiac catheterization with stent placement    OTHER SURGICAL HISTORY  12/29/2022    Lower back surgery    OTHER SURGICAL HISTORY  08/31/2021    Arm surgery     Family History   Problem Relation Name Age of Onset    Diabetes Mother      Lung cancer Mother      Coronary artery disease Father        Social History     Tobacco Use    Smoking status: Every Day     Current packs/day: 1.00     Types: Cigarettes     Passive exposure: Current    Smokeless tobacco: Never   Vaping Use    Vaping status: Never Used   Substance Use Topics    Alcohol use: Yes     Alcohol/week: 60.0 standard drinks of alcohol     Types: 60 Cans of beer per week    Drug use: Never        Objective     Visit Vitals  /72 (BP Location: Right arm, Patient Position: Sitting, BP Cuff Size: Adult)   Pulse 56   Temp 36.1 °C (96.9 °F)   Resp 20   Ht 1.651 m (5' 5\")   Wt 67.4 kg (148 lb 8 oz)   SpO2 96%   BMI 24.71 kg/m²   Smoking Status Every Day   BSA 1.76 m²        Physical Exam  Constitutional:       Appearance: Normal appearance.   HENT:      Head: Normocephalic and atraumatic.   Eyes:      Extraocular Movements: Extraocular movements intact.      Pupils: Pupils are equal, round, and reactive to light.   Cardiovascular:      Rate and Rhythm: Normal rate and regular rhythm.   Pulmonary:      Effort: Pulmonary effort is normal.      Breath sounds: Normal breath sounds.   Abdominal:      General: Abdomen is flat. Bowel sounds are normal.      Palpations: Abdomen is soft.   Musculoskeletal:         General: Normal range of motion.   Skin:     General: Skin is warm and dry.      Capillary Refill: Capillary refill takes less than 2 seconds.   Neurological:      General: No focal deficit " present.      Mental Status: He is alert and oriented to person, place, and time.   Psychiatric:         Mood and Affect: Mood normal.         Behavior: Behavior normal.           Assessment/Plan   Problem List Items Addressed This Visit       Alcoholism, chronic (Multi)     Discussed elevated AST/ALT seen on labs drawn by Dr. Jeffers in May 2024  Patient states he drinks to control back pain, doesn't want to take any more medications for pain  Discussed that continued drinking will likely lead to cirrhosis and liver failure and that cessation was imperative.  Following with pain medicine.  Encouraged to investigate AA and/or work on cutting back alcohol intake         Essential hypertension     Normotensive in office at 124/72  Home blood pressures running 130-150 systolic however all blood pressure readings taken in office over the last year have been in the 120s range.    No changes today; continue amlodipine 10mg daily, carvedilol 6/25mg twice daily, lisinopril 40mg daily  Advised to bring home blood pressure cuff into office for check against manual BP         Hepatic steatosis    Tobacco use     Currently smoking 2 PPD  Tried to quit in recent months but had episode of psychosis, slept for 2 days and doesn't remember events of those days  Discussed bupropion vs NRT  Feel NRT at this time may be best option, concern psychosis was due to abrupt tobacco cessation?    Start NRT patches and gum PRN cravings    I spent 7 minutes of this visit providing individualized smoking cessation counseling to the patient including the health risks associated with continued smoking and benefits of quitting as well as treatment options available           Encounter for Medicare annual wellness exam     Flu vaccination: Recommended annually  Pneumococcal 23v: Previously given  PCV 13/20: Previously given  Shingrix vaccine: 1/2 completed shingrix  RSV: Recommended to obtain from local pharmacy  Colon cancer screening: Repeat in  2028  PSA: Per Dr. Osman            Other Visit Diagnoses       Mass of arm, left    -  Primary    Relevant Orders    XR elbow left 1-2 views    Referral to Orthopaedic Surgery    Muscle spasm        Tobacco dependence        Relevant Medications    nicotine (Nicoderm CQ) 7 mg/24 hr patch (Start on 8/6/2024)    nicotine (Nicoderm CQ) 14 mg/24 hr patch (Start on 7/7/2024)    nicotine (Nicoderm CQ) 21 mg/24 hr patch    nicotine polacrilex (Nicorette) 4 mg gum    Elevated liver enzymes        Relevant Orders    Hepatic function panel    Routine general medical examination at health care facility                All pertinent lab work and results were reviewed with patient.     Follow up with me in 3 months for NRT/liver follow up    Arelis Zee, HENRY-CNS

## 2024-06-12 NOTE — PATIENT INSTRUCTIONS
Thank you for coming to see me today.  If you have any questions or concerns following our visit, please contact the office.  Phone: [phone]    Follow up with me in 3 months or sooner as needed  Schedule a nurse visit in the next 1-2 weeks for blood pressure cuff check  Get Xray of elbow on your way out today    1)  START nicotine patch 21mg daily for 1 month, then 14mg daily for 1 month then 7 mg daily for 1 month then stop.  Use nicotine gum as needed for breakthrough cravings. If patches not covered by insurance can call 7-412SemantriaQUITSemantriaNOW which is state tax-payer funded tobacco quit line, they will send you patches for free    2) Can use nicotine gum for breakthrough cigarette cravings    3) Get second shingrix vaccine from local pharmacy    4) I am referring you to orthopedic surgery for evaluation of elbow mass - please call [phone] to schedule an appointment or schedule at  on your way out     5) Get non-fasting labwork a few days prior to next visit.  The lab is down the mahoney from our office.

## 2024-06-12 NOTE — ASSESSMENT & PLAN NOTE
Flu vaccination: Recommended annually  Pneumococcal 23v: Previously given  PCV 13/20: Previously given  Shingrix vaccine: 1/2 completed shingrix  RSV: Recommended to obtain from local pharmacy  Colon cancer screening: Repeat in 2028  PSA: Per Dr. Osman

## 2024-06-13 NOTE — ASSESSMENT & PLAN NOTE
Normotensive in office at 124/72  Home blood pressures running 130-150 systolic however all blood pressure readings taken in office over the last year have been in the 120s range.    No changes today; continue amlodipine 10mg daily, carvedilol 6/25mg twice daily, lisinopril 40mg daily  Advised to bring home blood pressure cuff into office for check against manual BP

## 2024-06-13 NOTE — ASSESSMENT & PLAN NOTE
Currently smoking 2 PPD  Tried to quit in recent months but had episode of psychosis, slept for 2 days and doesn't remember events of those days  Discussed bupropion vs NRT  Feel NRT at this time may be best option, concern psychosis was due to abrupt tobacco cessation?    Start NRT patches and gum PRN cravings    I spent 7 minutes of this visit providing individualized smoking cessation counseling to the patient including the health risks associated with continued smoking and benefits of quitting as well as treatment options available

## 2024-06-14 ENCOUNTER — APPOINTMENT (OUTPATIENT)
Dept: PULMONOLOGY | Facility: HOSPITAL | Age: 64
End: 2024-06-14
Payer: COMMERCIAL

## 2024-06-24 ENCOUNTER — OFFICE VISIT (OUTPATIENT)
Dept: PULMONOLOGY | Facility: HOSPITAL | Age: 64
End: 2024-06-24
Payer: COMMERCIAL

## 2024-06-24 VITALS
RESPIRATION RATE: 16 BRPM | HEIGHT: 66 IN | BODY MASS INDEX: 23.48 KG/M2 | OXYGEN SATURATION: 96 % | HEART RATE: 57 BPM | DIASTOLIC BLOOD PRESSURE: 83 MMHG | SYSTOLIC BLOOD PRESSURE: 135 MMHG | WEIGHT: 146.1 LBS

## 2024-06-24 DIAGNOSIS — F17.210 CIGARETTE SMOKER: ICD-10-CM

## 2024-06-24 DIAGNOSIS — J44.9 CHRONIC OBSTRUCTIVE PULMONARY DISEASE, UNSPECIFIED COPD TYPE (MULTI): Primary | ICD-10-CM

## 2024-06-24 PROCEDURE — 4004F PT TOBACCO SCREEN RCVD TLK: CPT | Performed by: NURSE PRACTITIONER

## 2024-06-24 PROCEDURE — 99213 OFFICE O/P EST LOW 20 MIN: CPT | Performed by: NURSE PRACTITIONER

## 2024-06-24 PROCEDURE — 3075F SYST BP GE 130 - 139MM HG: CPT | Performed by: NURSE PRACTITIONER

## 2024-06-24 PROCEDURE — 3079F DIAST BP 80-89 MM HG: CPT | Performed by: NURSE PRACTITIONER

## 2024-06-24 ASSESSMENT — PATIENT HEALTH QUESTIONNAIRE - PHQ9
SUM OF ALL RESPONSES TO PHQ9 QUESTIONS 1 AND 2: 0
1. LITTLE INTEREST OR PLEASURE IN DOING THINGS: NOT AT ALL
2. FEELING DOWN, DEPRESSED OR HOPELESS: NOT AT ALL

## 2024-06-24 ASSESSMENT — ENCOUNTER SYMPTOMS
CHILLS: 0
UNEXPECTED WEIGHT CHANGE: 0
SHORTNESS OF BREATH: 1
COUGH: 1
FATIGUE: 0
RHINORRHEA: 0
WHEEZING: 0
FEVER: 0

## 2024-06-24 ASSESSMENT — COLUMBIA-SUICIDE SEVERITY RATING SCALE - C-SSRS
2. HAVE YOU ACTUALLY HAD ANY THOUGHTS OF KILLING YOURSELF?: NO
6. HAVE YOU EVER DONE ANYTHING, STARTED TO DO ANYTHING, OR PREPARED TO DO ANYTHING TO END YOUR LIFE?: NO
1. IN THE PAST MONTH, HAVE YOU WISHED YOU WERE DEAD OR WISHED YOU COULD GO TO SLEEP AND NOT WAKE UP?: NO

## 2024-06-24 NOTE — PATIENT INSTRUCTIONS
Continue on Anoro one puff once a day.   Continue albuterol as needed.  Please get CT scan of your chest in October.   Call with any questions or concerns.  Follow up with me in 9 months.

## 2024-06-24 NOTE — PROGRESS NOTES
Subjective   Patient ID: Isael Haji is a 63 y.o. male who presents for COPD follow up.     HPI: Patient has PMHx s/f CAD (reports prior stent but none visualized on last cardiac cath in 2015, moderate non obstructive disease then), CVA last year, COPD (no PFTs, no baseline O2 requirement), HTN and DLD. I saw him as a hospital wellness patient in the past. He states that he is getting short of breath on any activity. He is coughing up thick sputum every day as well. He is currently on Spiriva and using his albuterol about 5 times per day but does not feel that it is helping. He only reports occasional sinus drainage and congestion. He states he had sleep study and will be started on CPAP with neurology, it has been mailed to him they said. PFTs were done in January did show air trapping with RV 13. He denies any fever, chills, chest pain, leg swelling, unintentional weight loss, or hemoptysis. He is a current smoker at 1/2 ppd but has mostly smoked at 2 ppd , he started at 10 years old. He states that he has not smoked for the past 3 days. He is currently dependent on ETOH at 6-8 beers per day. 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     Today he is here for follow up. He states that his breathing has been mostly stable. He is using albuterol a few times per day. He is smoking at 1 ppd. He has no other concerns.     Review of Systems   Constitutional:  Negative for chills, fatigue, fever and unexpected weight change.   HENT:  Negative for congestion, postnasal drip and rhinorrhea.    Respiratory:  Positive for cough (denies hemoptysis.) and shortness of breath. Negative for wheezing.    Cardiovascular:  Negative for chest pain and leg swelling.   All other systems reviewed and are negative.      Objective   Physical Exam  Vitals reviewed.   Constitutional:       Appearance: Normal appearance.   HENT:      Head: Normocephalic.   Cardiovascular:      Rate and Rhythm: Normal rate and regular  rhythm.   Pulmonary:      Effort: Pulmonary effort is normal.      Breath sounds: Decreased breath sounds present.   Skin:     General: Skin is warm and dry.   Neurological:      Mental Status: He is alert.         Assessment/Plan      1. COPD, mild  2. SHAHZAD  3. Nicotine dependence  4. S/P CVA      Plan     -PFTs done in January 2021 with air trapping, , no obstruction. Patient has chronic bronchitis clinically as well. Continue Anoro and albuterol prn. AAT phenotype not determined but levels are normal. Repeat PFTs done October 2023 with mild COPD. FEV1/FVC 67, FEV1 110. Discussed results.     -He had a sleep study done with neurology and with mild SHAHZAD. He has not been able to tolerate CPAP due to leg cramping at night, he is following up with Dr. Jose.      -He is currently smoking at 1-1.5 ppd at this time, he has smoked at 2 ppd mostly and started when he was 10 years old. Smoking cessation counseling provided for 5 min, he declines any NRT, he does still want to quit. LDCT was done October 2023 and no suspicious nodules, recommend continue yearly screening I ordered this for October 2024.     Overall I will continue with current regimen. We will get LDCT in October and I will bring him back with me in 9 months. I instructed patient to call sooner if needed.

## 2024-06-25 DIAGNOSIS — I25.10 ATHEROSCLEROSIS OF NATIVE CORONARY ARTERY OF NATIVE HEART WITHOUT ANGINA PECTORIS: ICD-10-CM

## 2024-06-25 RX ORDER — LISINOPRIL 40 MG/1
40 TABLET ORAL DAILY
Qty: 90 TABLET | Refills: 3 | Status: SHIPPED | OUTPATIENT
Start: 2024-06-25

## 2024-07-03 ENCOUNTER — APPOINTMENT (OUTPATIENT)
Dept: ORTHOPEDIC SURGERY | Facility: CLINIC | Age: 64
End: 2024-07-03
Payer: COMMERCIAL

## 2024-07-03 VITALS — HEIGHT: 66 IN | BODY MASS INDEX: 23.49 KG/M2 | WEIGHT: 146.16 LBS

## 2024-07-03 DIAGNOSIS — M77.12 LATERAL EPICONDYLITIS OF LEFT ELBOW: Primary | ICD-10-CM

## 2024-07-03 PROCEDURE — 99214 OFFICE O/P EST MOD 30 MIN: CPT | Performed by: STUDENT IN AN ORGANIZED HEALTH CARE EDUCATION/TRAINING PROGRAM

## 2024-07-03 PROCEDURE — 4004F PT TOBACCO SCREEN RCVD TLK: CPT | Performed by: STUDENT IN AN ORGANIZED HEALTH CARE EDUCATION/TRAINING PROGRAM

## 2024-07-03 RX ORDER — NAPROXEN 500 MG/1
500 TABLET ORAL 2 TIMES DAILY
Qty: 60 TABLET | Refills: 0 | Status: SHIPPED | OUTPATIENT
Start: 2024-07-03 | End: 2024-08-02

## 2024-07-03 ASSESSMENT — PAIN DESCRIPTION - DESCRIPTORS: DESCRIPTORS: BURNING

## 2024-07-03 ASSESSMENT — PAIN - FUNCTIONAL ASSESSMENT: PAIN_FUNCTIONAL_ASSESSMENT: 0-10

## 2024-07-03 ASSESSMENT — PAIN SCALES - GENERAL: PAINLEVEL_OUTOF10: 5 - MODERATE PAIN

## 2024-07-03 NOTE — PROGRESS NOTES
PRIMARY CARE PHYSICIAN: HENRY Moore-CNS  REFERRING PROVIDER: No referring provider defined for this encounter.     CONSULT ORTHOPAEDIC: Elbow Evaluation    ASSESSMENT & PLAN    Impression: 63 y.o. male with a left elbow lateral epicondylitis.    Plan:   I explained to the patient the nature of their diagnosis.  I reviewed their imaging studies with them.    Based on the history, physical exam and imaging studies above, the patient's presentation is consistent with the above diagnosis.  I had a long discussion with the patient regarding their presentation and the treatment options.  We discussed initial nonoperative versus operative management options as well as potential further diagnostic imaging.  I reviewed the patient's x-ray findings with him.  We discussed the initial nonoperative management for lateral epicondylitis.  I provided him with a prescription for naproxen as an anti-inflammatory.  He will work on gentle stretching and strengthening.  He will avoid aggravating motions and positions.  If he does not improve sufficiently with the above then we will discuss the possibility of corticosteroid injection versus bracing/formal Occupational Therapy    Follow-Up: Patient will follow-up as needed    At the end of the visit, all questions were answered in full. The patient is in agreement with the plan and recommendations. They will call the office with any questions/concerns.    Note dictated with Twillion software. Completed without full typed error editing and sent to avoid delay.     SUBJECTIVE  CHIEF COMPLAINT: Left elbow pain    HPI: Isael Haji is a 63 y.o. patient who presents today with left elbow injury. Pt has a bump lateral to the elbow and describes the pain as burning around the bump. Pt does not report any trauma.  Pain with resisted wrist and finger extension..  He recalls.    They deny any associated neck pain.  No numbness, tingling, or  paresthesias.    REVIEW OF SYSTEMS  Constitutional: See HPI for pain assessment, No significant weight loss, recent trauma  Cardiovascular: No chest pain, shortness of breath  Respiratory: No difficulty breathing, cough  Gastrointestinal: No nausea, vomiting, diarrhea, constipation  Musculoskeletal: Noted in HPI, positive for pain, restricted motion, stiffness  Integumentary: No rashes, easy bruising, redness   Neurological: no numbness or tingling in extremities, no gait disturbances   Psychiatric: No mood changes, memory changes, social issues  Heme/Lymph: no excessive swelling, easy bruising, excessive bleeding  ENT: No hearing changes  Eyes: No vision changes    Past Medical History:   Diagnosis Date    Long term (current) use of opiate analgesic 05/04/2022    Long term (current) use of opiate analgesic    Other bursal cyst, unspecified elbow 04/29/2022    Synovial cyst of elbow    Personal history of other diseases of the circulatory system 12/09/2020    History of hypertension        Allergies   Allergen Reactions    Codeine Unknown     Gastrointestinal upset         Past Surgical History:   Procedure Laterality Date    MR HEAD ANGIO WO IV CONTRAST  11/29/2020    MR HEAD ANGIO WO IV CONTRAST 11/29/2020 [facility]    MR NECK ANGIO WO IV CONTRAST  11/29/2020    MR NECK ANGIO WO IV CONTRAST 11/29/2020 [facility]    OTHER SURGICAL HISTORY  12/09/2020    Rotator cuff repair    OTHER SURGICAL HISTORY  12/09/2020    Cardiac catheterization with stent placement    OTHER SURGICAL HISTORY  12/29/2022    Lower back surgery    OTHER SURGICAL HISTORY  08/31/2021    Arm surgery        Family History   Problem Relation Name Age of Onset    Diabetes Mother      Lung cancer Mother      Coronary artery disease Father          Social History     Socioeconomic History    Marital status: Single     Spouse name: Not on file    Number of children: Not on file    Years of education: Not on file    Highest  education level: Not on file   Occupational History    Not on file   Tobacco Use    Smoking status: Every Day     Current packs/day: 1.00     Types: Cigarettes     Passive exposure: Current    Smokeless tobacco: Never   Vaping Use    Vaping status: Never Used   Substance and Sexual Activity    Alcohol use: Yes     Alcohol/week: 60.0 standard drinks of alcohol     Types: 60 Cans of beer per week    Drug use: Never    Sexual activity: Not on file   Other Topics Concern    Not on file   Social History Narrative    Not on file     Social Determinants of Health     Financial Resource Strain: Not on file   Food Insecurity: No Food Insecurity (6/12/2024)    Hunger Vital Sign     Worried About Running Out of Food in the Last Year: Never true     Ran Out of Food in the Last Year: Never true   Transportation Needs: Not on file   Physical Activity: Not on file   Stress: Not on file   Social Connections: Not on file   Intimate Partner Violence: Not on file   Housing Stability: Not on file        CURRENT MEDICATIONS:   Current Outpatient Medications   Medication Sig Dispense Refill    albuterol 90 mcg/actuation inhaler Inhale 2 puffs every 4 hours if needed.      amLODIPine (Norvasc) 10 mg tablet Take 1 tablet (10 mg) by mouth once daily. 90 tablet 3    aspirin 81 mg chewable tablet Chew 1 tablet (81 mg) once daily. Chew and swallow.      atorvastatin (Lipitor) 80 mg tablet Take 0.5 tablets (40 mg) by mouth once daily.      carvedilol (Coreg) 6.25 mg tablet Take 1 tablet (6.25 mg) by mouth 2 times daily (morning and late afternoon).      ezetimibe (Zetia) 10 mg tablet Take 1 tablet (10 mg) by mouth once daily. 90 tablet 3    gabapentin (Neurontin) 300 mg capsule Take 3 capsules (900 mg) by mouth once daily at bedtime. 90 capsule 3    lisinopril 40 mg tablet TAKE ONE TABLET BY MOUTH DAILY 90 tablet 3    methocarbamol (Robaxin) 500 mg tablet Take 1 tablet (500 mg) by mouth once daily as needed for muscle spasms. (Patient taking  "differently: Take 0.5 tablets (250 mg) by mouth once daily as needed for muscle spasms.) 30 tablet 3    miscellaneous medical supply (Blood Pressure Cuff) misc 1 Units once daily. 1 each 0    [START ON 7/7/2024] nicotine (Nicoderm CQ) 14 mg/24 hr patch Place 1 patch over 24 hours on the skin once every 24 hours for 28 days. Do not fill before July 7, 2024. 28 patch 0    nicotine (Nicoderm CQ) 21 mg/24 hr patch Place 1 patch over 24 hours on the skin once every 24 hours. 28 patch 0    [START ON 8/6/2024] nicotine (Nicoderm CQ) 7 mg/24 hr patch Place 1 patch over 24 hours on the skin once every 24 hours for 28 days. Do not fill before August 6, 2024. 28 patch 0    nicotine polacrilex (Nicorette) 4 mg gum Chew 1 each (4 mg) if needed for smoking cessation. 100 each 2    sildenafil (Viagra) 100 mg tablet Take 1 tablet (100 mg) by mouth once daily as needed for erectile dysfunction. 10 tablet 3    umeclidinium-vilanteroL (Anoro Ellipta) 62.5-25 mcg/actuation blister with device Inhale 1 puff once daily.       No current facility-administered medications for this visit.        OBJECTIVE    PHYSICAL EXAM      9/15/2023     9:05 AM 10/9/2023     8:53 AM 12/12/2023     8:30 AM 4/8/2024     8:08 AM 5/6/2024     3:18 PM 6/12/2024     8:15 AM 6/24/2024    10:40 AM   Vitals   Systolic 118 111 121 120 130 124 135   Diastolic 72 75 75 78 72 72 83   Heart Rate 65 59 79 65 72 56 57   Temp 37 °C (98.6 °F)  37.2 °C (98.9 °F)   36.1 °C (96.9 °F)    Resp 16 16 16 16  20 16   Height (in)  1.651 m (5' 5\") 1.651 m (5' 5\") 1.651 m (5' 5\") 1.651 m (5' 5\") 1.651 m (5' 5\") 1.683 m (5' 6.25\")   Weight (lb) 143.6 144.9 146 148.7 149.4 148.5 146.1   BMI 23.53 kg/m2 24.11 kg/m2 24.3 kg/m2 24.74 kg/m2 24.86 kg/m2 24.71 kg/m2 23.4 kg/m2   BSA (m2) 1.73 m2 1.74 m2 1.74 m2 1.76 m2 1.76 m2 1.76 m2 1.76 m2   Visit Report  Report Report Report Report Report Report      There is no height or weight on file to calculate BMI.    GENERAL: A/Ox3, NAD. Appears " healthy, well nourished  PSYCHIATRIC: Mood stable, appropriate memory recall  EYES: EOM intact, no scleral icterus  CARDIOVASCULAR: Palpable peripheral pulses  LUNGS: Breathing non-labored on room air  SKIN: no erythema, rashes, or ecchymosis     MUSCULOSKELETAL:  Laterality: left Elbow Exam  - Negative Spurlings, full painless neck and shoulder ROM  - Skin intact  - No erythema or warmth  - No ecchymosis or soft tissue swelling  - Elbow ROM: 0-5-125  - Strength:      Flexion 5-/5     Extension 5-/5     Pronation/supination 5-/5  - Palpation: Positive tenderness lateral epicondyle of the distal humerus  - Stability: Varus/valgus stable  - Special Tests: Positive pain with resisted wrist and finger extension    NEUROVASCULAR:  - Neurovascular Status: sensation intact to light touch distally, upper extremity motor grossly intact  - Capillary refill brisk at extremities, Bilateral peripheral pulses 2+    Imaging: Multiple views of the affected left elbow(s) demonstrate: No significant osseous normality, no fracture, no significant degenerative change.   Images were personally reviewed and interpreted by me.  Radiology reports were reviewed by me as well, if readily available at the time.      Jose Bartlett MD  Attending Surgeon    Sports Medicine Orthopaedic Surgery  [facility] [place]  [facility] [employer]

## 2024-09-18 ENCOUNTER — APPOINTMENT (OUTPATIENT)
Dept: PRIMARY CARE | Facility: CLINIC | Age: 64
End: 2024-09-18
Payer: COMMERCIAL

## 2024-09-18 ENCOUNTER — LAB (OUTPATIENT)
Dept: LAB | Facility: LAB | Age: 64
End: 2024-09-18
Payer: COMMERCIAL

## 2024-09-18 VITALS
BODY MASS INDEX: 22.66 KG/M2 | HEIGHT: 66 IN | WEIGHT: 141 LBS | SYSTOLIC BLOOD PRESSURE: 124 MMHG | DIASTOLIC BLOOD PRESSURE: 76 MMHG | OXYGEN SATURATION: 98 % | HEART RATE: 58 BPM | TEMPERATURE: 97.3 F | RESPIRATION RATE: 16 BRPM

## 2024-09-18 DIAGNOSIS — F10.20 ALCOHOLISM, CHRONIC (MULTI): ICD-10-CM

## 2024-09-18 DIAGNOSIS — R25.2 MUSCLE CRAMPING: ICD-10-CM

## 2024-09-18 DIAGNOSIS — R74.8 ELEVATED LIVER ENZYMES: ICD-10-CM

## 2024-09-18 DIAGNOSIS — Z72.0 TOBACCO USE: ICD-10-CM

## 2024-09-18 DIAGNOSIS — I10 ESSENTIAL HYPERTENSION: Primary | ICD-10-CM

## 2024-09-18 DIAGNOSIS — R25.2 LEG CRAMPING: ICD-10-CM

## 2024-09-18 DIAGNOSIS — M62.838 MUSCLE SPASM: ICD-10-CM

## 2024-09-18 PROBLEM — G47.10 HYPERSOMNIA: Status: ACTIVE | Noted: 2024-09-18

## 2024-09-18 PROBLEM — G56.00 CARPAL TUNNEL SYNDROME: Status: ACTIVE | Noted: 2024-09-18

## 2024-09-18 PROBLEM — Z86.79 HISTORY OF HYPERTENSION: Status: ACTIVE | Noted: 2024-09-18

## 2024-09-18 PROBLEM — M25.9 ELBOW DISORDER: Status: RESOLVED | Noted: 2024-09-18 | Resolved: 2024-09-18

## 2024-09-18 LAB
ALBUMIN SERPL BCP-MCNC: 4.5 G/DL (ref 3.4–5)
ALP SERPL-CCNC: 68 U/L (ref 33–136)
ALT SERPL W P-5'-P-CCNC: 40 U/L (ref 10–52)
AST SERPL W P-5'-P-CCNC: 42 U/L (ref 9–39)
BILIRUB DIRECT SERPL-MCNC: 0.1 MG/DL (ref 0–0.3)
BILIRUB SERPL-MCNC: 0.5 MG/DL (ref 0–1.2)
MAGNESIUM SERPL-MCNC: 1.95 MG/DL (ref 1.6–2.4)
PROT SERPL-MCNC: 7 G/DL (ref 6.4–8.2)

## 2024-09-18 PROCEDURE — 4004F PT TOBACCO SCREEN RCVD TLK: CPT

## 2024-09-18 PROCEDURE — 3078F DIAST BP <80 MM HG: CPT

## 2024-09-18 PROCEDURE — 99213 OFFICE O/P EST LOW 20 MIN: CPT

## 2024-09-18 PROCEDURE — 36415 COLL VENOUS BLD VENIPUNCTURE: CPT

## 2024-09-18 PROCEDURE — 83735 ASSAY OF MAGNESIUM: CPT

## 2024-09-18 PROCEDURE — 80076 HEPATIC FUNCTION PANEL: CPT

## 2024-09-18 PROCEDURE — 3008F BODY MASS INDEX DOCD: CPT

## 2024-09-18 PROCEDURE — 3074F SYST BP LT 130 MM HG: CPT

## 2024-09-18 RX ORDER — METHOCARBAMOL 500 MG/1
250 TABLET, FILM COATED ORAL DAILY PRN
Start: 2024-09-18 | End: 2024-10-18

## 2024-09-18 SDOH — ECONOMIC STABILITY: FOOD INSECURITY: WITHIN THE PAST 12 MONTHS, THE FOOD YOU BOUGHT JUST DIDN'T LAST AND YOU DIDN'T HAVE MONEY TO GET MORE.: NEVER TRUE

## 2024-09-18 SDOH — ECONOMIC STABILITY: FOOD INSECURITY: WITHIN THE PAST 12 MONTHS, YOU WORRIED THAT YOUR FOOD WOULD RUN OUT BEFORE YOU GOT MONEY TO BUY MORE.: NEVER TRUE

## 2024-09-18 ASSESSMENT — PATIENT HEALTH QUESTIONNAIRE - PHQ9
SUM OF ALL RESPONSES TO PHQ9 QUESTIONS 1 & 2: 2
2. FEELING DOWN, DEPRESSED OR HOPELESS: SEVERAL DAYS
10. IF YOU CHECKED OFF ANY PROBLEMS, HOW DIFFICULT HAVE THESE PROBLEMS MADE IT FOR YOU TO DO YOUR WORK, TAKE CARE OF THINGS AT HOME, OR GET ALONG WITH OTHER PEOPLE: SOMEWHAT DIFFICULT
1. LITTLE INTEREST OR PLEASURE IN DOING THINGS: SEVERAL DAYS

## 2024-09-18 ASSESSMENT — ENCOUNTER SYMPTOMS
PSYCHIATRIC NEGATIVE: 1
ENDOCRINE NEGATIVE: 1
RESPIRATORY NEGATIVE: 1
DEPRESSION: 0
GASTROINTESTINAL NEGATIVE: 1
CARDIOVASCULAR NEGATIVE: 1
CONSTITUTIONAL NEGATIVE: 1
OCCASIONAL FEELINGS OF UNSTEADINESS: 1
HEMATOLOGIC/LYMPHATIC NEGATIVE: 1
EYES NEGATIVE: 1
ARTHRALGIAS: 1
NEUROLOGICAL NEGATIVE: 1
LOSS OF SENSATION IN FEET: 1

## 2024-09-18 ASSESSMENT — LIFESTYLE VARIABLES
HOW OFTEN DO YOU HAVE SIX OR MORE DRINKS ON ONE OCCASION: DAILY OR ALMOST DAILY
HOW OFTEN DO YOU HAVE A DRINK CONTAINING ALCOHOL: 4 OR MORE TIMES A WEEK
HOW MANY STANDARD DRINKS CONTAINING ALCOHOL DO YOU HAVE ON A TYPICAL DAY: 7 TO 9
SKIP TO QUESTIONS 9-10: 0
AUDIT-C TOTAL SCORE: 11

## 2024-09-18 ASSESSMENT — PAIN SCALES - GENERAL: PAINLEVEL: 0-NO PAIN

## 2024-09-18 NOTE — ASSESSMENT & PLAN NOTE
Continues to have right calf cramping at bedtime    Demonstrated gastrocnemius stretches in office today, advised to try at home prior to bedtime  Check mag level with HFP today

## 2024-09-18 NOTE — ASSESSMENT & PLAN NOTE
States he's down to 1 PPD from 2 PPD   Has not tried patches as he's concerned with using them, his friend had a stroke after being on patches and smoking at the same time    Encouraged to try patches and gum  Congratulated on decreased intake

## 2024-09-18 NOTE — ASSESSMENT & PLAN NOTE
Has not had any success with cutting back alcohol intake  Did not have repeat liver function panel done prior to visit as directed    Again discussed importance of EtOH cessation given findings of elevated liver enzymes  Advised to have repeat liver enzymes checked  If they remain elevated, consider sending for liver US to rule out malignancy or other nefarious pathology contributing to elevation

## 2024-09-18 NOTE — PATIENT INSTRUCTIONS
Thank you for coming to see me today.  If you have any questions or concerns following our visit, please contact the office.  Phone: [phone]    Follow up with me in 6 months or sooner as needed    1)  Work on cutting back drinking to no more than 3 drinks per week    2) Work on quitting smoking    3) Get labwork today on your way out

## 2024-09-18 NOTE — ASSESSMENT & PLAN NOTE
Normotensive in office at 124/76  Home blood pressures running 120-140/80-90s however home blood pressure cuff appears to be 10-12 mmHg higher than manual reading thus home readings likely in normal range    Continue amlodipine 10mg daily, carvedilol 6.25mg BID, lisinopril 40mg daily

## 2024-09-18 NOTE — PROGRESS NOTES
Subjective   Patient ID: Isael Haji is a 64 y.o. male who presents for 3 month follow up of hypertension.    Started on NRT and gum for tobacco cravings, has not started using them due to concern of a friend who had a stroke after starting NRT and continuing to smoke while on it.    Home blood pressures running 110-140/80-90s.     Did not have repeat labwork done prior to visit as directed at last appt.    Diet: sandwiches, pork chops cooked in crock pot. Fried chicken monthly. Pork loins. Beef hamburgers and steak x3 times per month. Greenbeans. No at lot of processed foods. X2 cups of coffee per day with milk. Sweat tea 20oz bottle per day.   Exercise: No regular exercise, due to COPD, easily short of breath  Weight: stable  Water: 2-3 bottle per day  Sleep: Not good due to leg cramping. Six hours of sleep.   Social: . 1 son, near by. Mobile home. Sister lives with him, has a pit bull.   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     Review of Systems   Constitutional: Negative.    HENT: Negative.     Eyes: Negative.    Respiratory: Negative.     Cardiovascular: Negative.    Gastrointestinal: Negative.    Endocrine: Negative.    Genitourinary: Negative.    Musculoskeletal:  Positive for arthralgias.        Right ankle pain   Skin: Negative.    Neurological: Negative.    Hematological: Negative.    Psychiatric/Behavioral: Negative.          Current Outpatient Medications   Medication Sig Dispense Refill    albuterol 90 mcg/actuation inhaler Inhale 2 puffs every 4 hours if needed.      amLODIPine (Norvasc) 10 mg tablet Take 1 tablet (10 mg) by mouth once daily. 90 tablet 3    aspirin 81 mg chewable tablet Chew 1 tablet (81 mg) once daily. Chew and swallow.      atorvastatin (Lipitor) 80 mg tablet Take 0.5 tablets (40 mg) by mouth once daily.      carvedilol (Coreg) 6.25 mg tablet Take 1 tablet (6.25 mg) by mouth 2 times daily (morning and late afternoon).      ezetimibe (Zetia) 10 mg tablet  Take 1 tablet (10 mg) by mouth once daily. 90 tablet 3    gabapentin (Neurontin) 300 mg capsule Take 3 capsules (900 mg) by mouth once daily at bedtime. 90 capsule 3    lisinopril 40 mg tablet TAKE ONE TABLET BY MOUTH DAILY 90 tablet 3    miscellaneous medical supply (Blood Pressure Cuff) misc 1 Units once daily. 1 each 0    umeclidinium-vilanteroL (Anoro Ellipta) 62.5-25 mcg/actuation blister with device Inhale 1 puff once daily.      methocarbamol (Robaxin) 500 mg tablet Take 0.5 tablets (250 mg) by mouth once daily as needed for muscle spasms.      nicotine (Nicoderm CQ) 14 mg/24 hr patch Place 1 patch over 24 hours on the skin once every 24 hours for 28 days. Do not fill before July 7, 2024. 28 patch 0    nicotine (Nicoderm CQ) 21 mg/24 hr patch Place 1 patch over 24 hours on the skin once every 24 hours. (Patient not taking: Reported on 9/18/2024) 28 patch 0    nicotine (Nicoderm CQ) 7 mg/24 hr patch Place 1 patch over 24 hours on the skin once every 24 hours for 28 days. Do not fill before August 6, 2024. 28 patch 0    nicotine polacrilex (Nicorette) 4 mg gum Chew 1 each (4 mg) if needed for smoking cessation. 100 each 2    sildenafil (Viagra) 100 mg tablet Take 1 tablet (100 mg) by mouth once daily as needed for erectile dysfunction. 10 tablet 3     No current facility-administered medications for this visit.     Past Surgical History:   Procedure Laterality Date    MR HEAD ANGIO WO IV CONTRAST  11/29/2020    MR HEAD ANGIO WO IV CONTRAST 11/29/2020 [facility]    MR NECK ANGIO WO IV CONTRAST  11/29/2020    MR NECK ANGIO WO IV CONTRAST 11/29/2020 [facility]    OTHER SURGICAL HISTORY  12/09/2020    Rotator cuff repair    OTHER SURGICAL HISTORY  12/09/2020    Cardiac catheterization with stent placement    OTHER SURGICAL HISTORY  12/29/2022    Lower back surgery    OTHER SURGICAL HISTORY  08/31/2021    Arm surgery     Family History   Problem Relation Name Age of Onset    Diabetes Mother       "Lung cancer Mother      Coronary artery disease Father        Social History     Tobacco Use    Smoking status: Every Day     Current packs/day: 1.00     Types: Cigarettes     Passive exposure: Current    Smokeless tobacco: Never   Vaping Use    Vaping status: Never Used   Substance Use Topics    Alcohol use: Yes     Alcohol/week: 60.0 standard drinks of alcohol     Types: 60 Cans of beer per week    Drug use: Never        Objective     Visit Vitals  /76 (BP Location: Left arm, Patient Position: Sitting, BP Cuff Size: Large adult)   Pulse 58   Temp 36.3 °C (97.3 °F)   Resp 16   Ht 1.683 m (5' 6.25\")   Wt 64 kg (141 lb)   SpO2 98%   BMI 22.59 kg/m²   Smoking Status Every Day   BSA 1.73 m²        Physical Exam      Assessment/Plan   Problem List Items Addressed This Visit       Alcoholism, chronic (Multi)     Has not had any success with cutting back alcohol intake  Did not have repeat liver function panel done prior to visit as directed    Again discussed importance of EtOH cessation given findings of elevated liver enzymes  Advised to have repeat liver enzymes checked  If they remain elevated, consider sending for liver US to rule out malignancy or other nefarious pathology contributing to elevation         Relevant Orders    Hepatic function panel    Essential hypertension - Primary     Normotensive in office at 124/76  Home blood pressures running 120-140/80-90s however home blood pressure cuff appears to be 10-12 mmHg higher than manual reading thus home readings likely in normal range    Continue amlodipine 10mg daily, carvedilol 6.25mg BID, lisinopril 40mg daily         Muscle cramping     Continues to have right calf cramping at bedtime    Demonstrated gastrocnemius stretches in office today, advised to try at home prior to bedtime  Check mag level with HFP today         Tobacco use     States he's down to 1 PPD from 2 PPD   Has not tried patches as he's concerned with using them, his friend had a stroke " after being on patches and smoking at the same time    Encouraged to try patches and gum  Congratulated on decreased intake          Other Visit Diagnoses       Muscle spasm        Relevant Medications    methocarbamol (Robaxin) 500 mg tablet    Leg cramping        Relevant Orders    Magnesium            All pertinent lab work and results were reviewed with patient.     Follow up with me in 6 months for liver enzymes, BP, tobacco dependence    Arelis Zee, HENRY-CNS

## 2024-10-03 ENCOUNTER — APPOINTMENT (OUTPATIENT)
Dept: RADIOLOGY | Facility: CLINIC | Age: 64
End: 2024-10-03
Payer: COMMERCIAL

## 2024-10-08 ENCOUNTER — OFFICE VISIT (OUTPATIENT)
Dept: PAIN MEDICINE | Facility: HOSPITAL | Age: 64
End: 2024-10-08
Payer: COMMERCIAL

## 2024-10-08 VITALS
SYSTOLIC BLOOD PRESSURE: 125 MMHG | OXYGEN SATURATION: 96 % | BODY MASS INDEX: 23.82 KG/M2 | HEIGHT: 65 IN | RESPIRATION RATE: 16 BRPM | WEIGHT: 143 LBS | DIASTOLIC BLOOD PRESSURE: 80 MMHG | HEART RATE: 60 BPM

## 2024-10-08 DIAGNOSIS — M54.16 LUMBAR RADICULOPATHY: Primary | ICD-10-CM

## 2024-10-08 DIAGNOSIS — M48.07 SPINAL STENOSIS OF LUMBOSACRAL REGION: ICD-10-CM

## 2024-10-08 PROCEDURE — 3079F DIAST BP 80-89 MM HG: CPT | Performed by: CLINICAL NURSE SPECIALIST

## 2024-10-08 PROCEDURE — 99214 OFFICE O/P EST MOD 30 MIN: CPT | Performed by: CLINICAL NURSE SPECIALIST

## 2024-10-08 PROCEDURE — 3074F SYST BP LT 130 MM HG: CPT | Performed by: CLINICAL NURSE SPECIALIST

## 2024-10-08 PROCEDURE — 4004F PT TOBACCO SCREEN RCVD TLK: CPT | Performed by: CLINICAL NURSE SPECIALIST

## 2024-10-08 PROCEDURE — 3008F BODY MASS INDEX DOCD: CPT | Performed by: CLINICAL NURSE SPECIALIST

## 2024-10-08 RX ORDER — GABAPENTIN 300 MG/1
900 CAPSULE ORAL NIGHTLY
Qty: 270 CAPSULE | Refills: 1 | Status: SHIPPED | OUTPATIENT
Start: 2024-10-08

## 2024-10-08 ASSESSMENT — ENCOUNTER SYMPTOMS
DEPRESSION: 0
OCCASIONAL FEELINGS OF UNSTEADINESS: 1
LOSS OF SENSATION IN FEET: 0

## 2024-10-08 ASSESSMENT — PATIENT HEALTH QUESTIONNAIRE - PHQ9
1. LITTLE INTEREST OR PLEASURE IN DOING THINGS: NOT AT ALL
2. FEELING DOWN, DEPRESSED OR HOPELESS: NOT AT ALL
SUM OF ALL RESPONSES TO PHQ9 QUESTIONS 1 AND 2: 0

## 2024-10-08 ASSESSMENT — COLUMBIA-SUICIDE SEVERITY RATING SCALE - C-SSRS
1. IN THE PAST MONTH, HAVE YOU WISHED YOU WERE DEAD OR WISHED YOU COULD GO TO SLEEP AND NOT WAKE UP?: NO
6. HAVE YOU EVER DONE ANYTHING, STARTED TO DO ANYTHING, OR PREPARED TO DO ANYTHING TO END YOUR LIFE?: NO
2. HAVE YOU ACTUALLY HAD ANY THOUGHTS OF KILLING YOURSELF?: NO

## 2024-10-08 NOTE — PROGRESS NOTES
Subjective   Patient ID: Isael Haji is a 64 y.o. male who presents for lumbar postlaminectomy syndrome  HPI    64-year-old male with history of CAD, COPD, CVA, hyperlipidemia, increased LFTs/chronic alcoholism, spinal stenosis status post laminectomy/discectomy and ulnar neuropathy presents for follow-up.  Patient has a history of previous lumbar surgery in November 2022 which patient states provided limited relief for back pain.  He has completed a formal course of physical therapy including aqua therapy x 2 with limited relief.  Recently increased his gabapentin to 900 mg at bedtime for persistent neuropathic symptoms and radicular pain.  Added methocarbamol for muscle tightness and spasms.  Patient followed up with Ortho/spine surgeon for reevaluation with notes indicating that the patient needs to stop smoking and decrease alcohol use prior to any consideration of surgical intervention.  Not willing to stop smoking or decrease alcohol use at this time.  Also discussed the possibility of epidural steroid injections targeting lumbar radicular symptoms.  Patient not interested in injections at this time.  Presents at today's office visit for routine follow-up.  Continues to experience low back pain with radiation into bilateral hips and lower extremities.  Pain accompanied by numbness and tingling bilateral lower extremities.  He denies any increasing weakness or changes in bowel/bladder function.  He has noted an increase in cramping/spasms in his low back and lower legs.  States that spasms keep him up at night.  His pain is aching, throbbing and can be burning.  Standing, bending and walking increases pain.  He does feel that the increase in gabapentin to 900 mg at bedtime has been beneficial.  Tolerates without adverse effects.  Using methocarbamol sparingly for muscle tightness and cramping.  He would like to continue to avoid any further surgical intervention or epidural steroid injections at this time.   Again, he is not willing to stop smoking or decrease alcohol use.  Continues home exercise and stretches.    Location of Pain: pt is here for interval follow up. Pt has low back pain along with numbness and tingling down the legs and also burning pain at site of surgery. Intermittent pain in bilateral hips. Cramping in legs at bedtime         Pain Score:  5/10    Other pain medication/neuromodulator: Gabapentin-needs refill/Methocarbamol- no refill needed    Therapeutic Goals: I would like to sleep better    Therapeutic Assessment: I am up most of the night and I cramp up    Injections and/or Procedures: denies    Other: Water therapy twice-not beneficial  OARRS:  Sola Crowder, APRN-CNP, APRN-CNS on 10/8/2024  8:21 AM  I have personally reviewed the OARRS report for Isael Haji. I have considered the risks of abuse, dependence, addiction and diversion    Is the patient prescribed a combination of a benzodiazepine and opioid?  No    Last Urine Drug Screen / ordered today: No  No results found for this or any previous visit (from the past 8760 hour(s)).  N/A    Controlled Substance Agreement:  Date of the Last Agreement:       Monitoring and compliance:    ORT:    PDUQ:    Office Agreement:      Review of Systems    ROS:   General: No fevers, chills, weight loss  Skin: Negative for lesions  Eyes: No acute vision changes  Ears: No vertigo  Nose, mouth, throat: No difficulty swallowing or speaking  Respiratory: No cough, shortness of breath, cyanosis  Cardiovascular: Negative for chest pain syncope or palpitation  Gastrointestinal: No constipation, nausea, vomiting  Neurological: Negative for headache, positive for: Paresthesia  Psychological: Negative for severe or debilitating anxiety, depression. Negative memory loss  Musculoskeletal: Positive for arthralgia, myalgia, pain and spasm  Endocrine: Negative for weight gain, appetite changes, excessive sweating  Allergy/immune: Negative    All 13 systems were  reviewed and are within normal levels except as noted or in the history of present illness.  Positive or pertinent negative responses are noted or were in the history of present illness. As noted, the patient denies significant or impairing weakness in the bilateral upper and lower extremities, medication induced constipation, and bowel or bladder incontinence.     Current Outpatient Medications:     albuterol 90 mcg/actuation inhaler, Inhale 2 puffs every 4 hours if needed., Disp: , Rfl:     amLODIPine (Norvasc) 10 mg tablet, Take 1 tablet (10 mg) by mouth once daily., Disp: 90 tablet, Rfl: 3    aspirin 81 mg chewable tablet, Chew 1 tablet (81 mg) once daily. Chew and swallow., Disp: , Rfl:     atorvastatin (Lipitor) 80 mg tablet, Take 0.5 tablets (40 mg) by mouth once daily., Disp: , Rfl:     carvedilol (Coreg) 6.25 mg tablet, Take 1 tablet (6.25 mg) by mouth 2 times daily (morning and late afternoon)., Disp: , Rfl:     ezetimibe (Zetia) 10 mg tablet, Take 1 tablet (10 mg) by mouth once daily., Disp: 90 tablet, Rfl: 3    lisinopril 40 mg tablet, TAKE ONE TABLET BY MOUTH DAILY, Disp: 90 tablet, Rfl: 3    methocarbamol (Robaxin) 500 mg tablet, Take 0.5 tablets (250 mg) by mouth once daily as needed for muscle spasms., Disp: , Rfl:     miscellaneous medical supply (Blood Pressure Cuff) misc, 1 Units once daily., Disp: 1 each, Rfl: 0    umeclidinium-vilanteroL (Anoro Ellipta) 62.5-25 mcg/actuation blister with device, Inhale 1 puff once daily., Disp: , Rfl:     gabapentin (Neurontin) 300 mg capsule, Take 3 capsules (900 mg) by mouth once daily at bedtime., Disp: 270 capsule, Rfl: 1    nicotine (Nicoderm CQ) 14 mg/24 hr patch, Place 1 patch over 24 hours on the skin once every 24 hours for 28 days. Do not fill before July 7, 2024., Disp: 28 patch, Rfl: 0    nicotine (Nicoderm CQ) 21 mg/24 hr patch, Place 1 patch over 24 hours on the skin once every 24 hours. (Patient not taking: Reported on 9/18/2024), Disp: 28 patch,  "Rfl: 0    nicotine (Nicoderm CQ) 7 mg/24 hr patch, Place 1 patch over 24 hours on the skin once every 24 hours for 28 days. Do not fill before August 6, 2024., Disp: 28 patch, Rfl: 0    nicotine polacrilex (Nicorette) 4 mg gum, Chew 1 each (4 mg) if needed for smoking cessation., Disp: 100 each, Rfl: 2    sildenafil (Viagra) 100 mg tablet, Take 1 tablet (100 mg) by mouth once daily as needed for erectile dysfunction., Disp: 10 tablet, Rfl: 3     Past Medical History:   Diagnosis Date    Elbow disorder 09/18/2024    Long term (current) use of opiate analgesic 05/04/2022    Long term (current) use of opiate analgesic    Other bursal cyst, unspecified elbow 04/29/2022    Synovial cyst of elbow    Personal history of other diseases of the circulatory system 12/09/2020    History of hypertension        Past Surgical History:   Procedure Laterality Date    MR HEAD ANGIO WO IV CONTRAST  11/29/2020    MR HEAD ANGIO WO IV CONTRAST 11/29/2020 [facility]    MR NECK ANGIO WO IV CONTRAST  11/29/2020    MR NECK ANGIO WO IV CONTRAST 11/29/2020 [facility]    OTHER SURGICAL HISTORY  12/09/2020    Rotator cuff repair    OTHER SURGICAL HISTORY  12/09/2020    Cardiac catheterization with stent placement    OTHER SURGICAL HISTORY  12/29/2022    Lower back surgery    OTHER SURGICAL HISTORY  08/31/2021    Arm surgery        Family History   Problem Relation Name Age of Onset    Diabetes Mother      Lung cancer Mother      Coronary artery disease Father          Allergies   Allergen Reactions    Codeine Unknown     Gastrointestinal upset         Objective     Visit Vitals  /80   Pulse 60   Resp 16   Ht 1.651 m (5' 5\")   Wt 64.9 kg (143 lb)   SpO2 96%   BMI 23.80 kg/m²   Smoking Status Every Day   BSA 1.73 m²        Physical Exam    PE:  General: Well-developed, well-nourished, no acute distress. The patient demonstrates no pain behavior, symptom magnification or overt drug-seeking behavior.  Eye: Pupils " appropriate for room lighting  Neck/thyroid: No obvious goiter or enlargement of neck noted  Respiratory exam: Normal respiratory effort, unlabored respiration. No accessory muscle use noted  Cardiac exam: Bilateral radial pulses intact  Abdominal: Nondistended  Spine, lumbar: The patient is able to rise from a seated to standing position without hesitancy, push off, or delay. Gait is grossly nonantalgic.  Posture is forward leaning.  Chronically elevates his left hip and left shoulder.  Tenderness to paraspinous musculature is noted lower lumbar region.  Flexion and extension both limited with extension increasing pain across his low back.  Neurologic exam: Muscle strength is antigravity in all 4 extremities.  Equal muscle strength bilateral lower extremities 5/5.  Normal sensation bilateral lower extremities.  Psychiatric exam: Judgment and insight normal, affect normal, speech is fluent, affect appropriate, demonstrating no signs of hypersomnolence, sedation, or confusion        Assessment/Plan   Problem List Items Addressed This Visit             ICD-10-CM    Lumbar radiculopathy - Primary M54.16    Spinal stenosis of lumbosacral region M48.07       64-year-old male with history of lumbar discectomy in November 2022 which provided limited relief from lumbar radicular symptoms.  Reevaluated by Ortho/spine surgeon for persistent lumbar radicular symptoms.  Ortho/spine surgeon recommending patient decrease alcohol use and proceed with smoking cessation prior to any consideration of further surgery.  Patient not willing to stop smoking or decrease alcohol use at this time.  Also discussed epidural steroid injections targeting lumbar radicular symptoms.  He would need a repeat MRI if he would like to pursue injections.  Patient states that he has done injections in the past and is not interested in pursuing injections at this time.  Managing his pain with gabapentin increased to 900 mg at bedtime and methocarbamol as  needed for muscle tightness and spasm.  Presenting at today's office visit for routine follow-up.  Continues to experience low back pain with radiation to bilateral lower extremities accompanied by numbness/tingling.  Denies any change or increase in neurologic symptoms at this time.  Patient is doing fairly well with the increased dose of gabapentin.  He does feel that the increased dose has provided additional neuropathic pain relief.  Utilizing Robaxin for muscle tightness and spasms.  Recommended he take his Robaxin at bedtime when his muscle spasms are most intense. He has completed a formal course of physical therapy multiple times including aqua therapy.  Admits to not consistently doing exercises at home.  Advised patient to consistently do home therapy stretches and exercises to strengthen his core/target his low back.  Plan discussed with patient at today's office visit.    -Continue gabapentin to 900 mg at bedtime.  The patient was counseled on the risks and potential side effects of gabapentin as well as its importance for dosage titration. Side effects included but were not limited to, drowsiness, sedation, cognitive decline, peripheral edema, weight gain, seizures, with abrupt withdrawal.  Patient was instructed to call the office with any concerns or side effects before abruptly stopping medication.   -Continue Robaxin 500 mg daily for muscle tightness and spasm.  Encouraged the patient to take this medication at bedtime when muscle spasm/cramping is most intense.  -Further discussion about potential injections including lumbar epidural steroid injections or surgical intervention if symptoms should worsen.  If patient would like to proceed with injections we will repeat lumbar MRI at that time.  -Patient will follow-up in 6 months or sooner if needed.  Advised patient to notify our office if symptoms do not improve or increase prior to his next visit.    Disclaimer: This note was transcribed using an  audio transcription device.  As such, minor errors may be present with regard to spelling, punctuation, and inadvertent word insertion.  Please disregard such errors.              Relevant Medications    gabapentin (Neurontin) 300 mg capsule

## 2024-10-08 NOTE — ASSESSMENT & PLAN NOTE
64-year-old male with history of lumbar discectomy in November 2022 which provided limited relief from lumbar radicular symptoms.  Reevaluated by Ortho/spine surgeon for persistent lumbar radicular symptoms.  Ortho/spine surgeon recommending patient decrease alcohol use and proceed with smoking cessation prior to any consideration of further surgery.  Patient not willing to stop smoking or decrease alcohol use at this time.  Also discussed epidural steroid injections targeting lumbar radicular symptoms.  He would need a repeat MRI if he would like to pursue injections.  Patient states that he has done injections in the past and is not interested in pursuing injections at this time.  Managing his pain with gabapentin increased to 900 mg at bedtime and methocarbamol as needed for muscle tightness and spasm.  Presenting at today's office visit for routine follow-up.  Continues to experience low back pain with radiation to bilateral lower extremities accompanied by numbness/tingling.  Denies any change or increase in neurologic symptoms at this time.  Patient is doing fairly well with the increased dose of gabapentin.  He does feel that the increased dose has provided additional neuropathic pain relief.  Utilizing Robaxin for muscle tightness and spasms.  Recommended he take his Robaxin at bedtime when his muscle spasms are most intense. He has completed a formal course of physical therapy multiple times including aqua therapy.  Admits to not consistently doing exercises at home.  Advised patient to consistently do home therapy stretches and exercises to strengthen his core/target his low back.  Plan discussed with patient at today's office visit.    -Continue gabapentin to 900 mg at bedtime.  The patient was counseled on the risks and potential side effects of gabapentin as well as its importance for dosage titration. Side effects included but were not limited to, drowsiness, sedation, cognitive decline, peripheral  edema, weight gain, seizures, with abrupt withdrawal.  Patient was instructed to call the office with any concerns or side effects before abruptly stopping medication.   -Continue Robaxin 500 mg daily for muscle tightness and spasm.  Encouraged the patient to take this medication at bedtime when muscle spasm/cramping is most intense.  -Further discussion about potential injections including lumbar epidural steroid injections or surgical intervention if symptoms should worsen.  If patient would like to proceed with injections we will repeat lumbar MRI at that time.  -Patient will follow-up in 6 months or sooner if needed.  Advised patient to notify our office if symptoms do not improve or increase prior to his next visit.    Disclaimer: This note was transcribed using an audio transcription device.  As such, minor errors may be present with regard to spelling, punctuation, and inadvertent word insertion.  Please disregard such errors.

## 2024-10-10 ENCOUNTER — HOSPITAL ENCOUNTER (OUTPATIENT)
Dept: RADIOLOGY | Facility: CLINIC | Age: 64
Discharge: HOME | End: 2024-10-10
Payer: COMMERCIAL

## 2024-10-10 DIAGNOSIS — F17.210 CIGARETTE SMOKER: ICD-10-CM

## 2024-10-10 PROCEDURE — 71271 CT THORAX LUNG CANCER SCR C-: CPT

## 2024-10-16 ENCOUNTER — TELEPHONE (OUTPATIENT)
Dept: PULMONOLOGY | Facility: HOSPITAL | Age: 64
End: 2024-10-16
Payer: COMMERCIAL

## 2024-10-16 NOTE — TELEPHONE ENCOUNTER
Called and spoke with patient regarding results and let him know that we will be doing a yearly screening instead of a 6 month. Answered all questions at this time and instructed him to call us back with any further questions or concerns. Patient was agreeable to treatment plan and acknowledged understanding.     ----- Message from Coco Gonsalez sent at 10/15/2024  9:55 AM EDT -----  Please call the patient and let him know that his CT chest was mostly stable from prior. Can you let him know that if he sees it on the portal the radiologist that read this scan recommended a 6 month follow up, however I disagreed with this and discussed and reviewed case with radiologist Dr. Art and also with Dr. Garcia and it is very similar to prior there is no indication to repeat this in 6 months, we will continue with yearly screening for him.

## 2024-11-12 DIAGNOSIS — J44.9 CHRONIC OBSTRUCTIVE PULMONARY DISEASE, UNSPECIFIED COPD TYPE (MULTI): Primary | ICD-10-CM

## 2024-11-12 RX ORDER — ALBUTEROL SULFATE 90 UG/1
2 INHALANT RESPIRATORY (INHALATION) EVERY 4 HOURS PRN
Qty: 8.5 G | Refills: 11 | Status: SHIPPED | OUTPATIENT
Start: 2024-11-12

## 2024-11-29 DIAGNOSIS — I10 ESSENTIAL HYPERTENSION: ICD-10-CM

## 2024-11-29 RX ORDER — AMLODIPINE BESYLATE 10 MG/1
10 TABLET ORAL DAILY
Qty: 90 TABLET | Refills: 3 | Status: SHIPPED | OUTPATIENT
Start: 2024-11-29 | End: 2025-11-29

## 2024-11-29 RX ORDER — CARVEDILOL 6.25 MG/1
6.25 TABLET ORAL
Qty: 180 TABLET | Refills: 1 | Status: SHIPPED | OUTPATIENT
Start: 2024-11-29

## 2024-12-02 DIAGNOSIS — J44.9 CHRONIC OBSTRUCTIVE PULMONARY DISEASE, UNSPECIFIED COPD TYPE (MULTI): ICD-10-CM

## 2024-12-02 RX ORDER — UMECLIDINIUM BROMIDE AND VILANTEROL TRIFENATATE 62.5; 25 UG/1; UG/1
1 POWDER RESPIRATORY (INHALATION) DAILY
Qty: 3 EACH | Refills: 3 | Status: SHIPPED | OUTPATIENT
Start: 2024-12-02

## 2024-12-02 RX ORDER — ALBUTEROL SULFATE 90 UG/1
2 INHALANT RESPIRATORY (INHALATION) EVERY 4 HOURS PRN
Qty: 8.5 G | Refills: 11 | Status: SHIPPED | OUTPATIENT
Start: 2024-12-02

## 2024-12-07 DIAGNOSIS — M48.07 SPINAL STENOSIS OF LUMBOSACRAL REGION: ICD-10-CM

## 2024-12-10 RX ORDER — GABAPENTIN 300 MG/1
900 CAPSULE ORAL NIGHTLY
Qty: 90 CAPSULE | Refills: 0 | OUTPATIENT
Start: 2024-12-10

## 2025-01-10 ENCOUNTER — LAB (OUTPATIENT)
Dept: LAB | Facility: LAB | Age: 65
End: 2025-01-10
Payer: COMMERCIAL

## 2025-01-10 ENCOUNTER — APPOINTMENT (OUTPATIENT)
Dept: UROLOGY | Facility: CLINIC | Age: 65
End: 2025-01-10
Payer: COMMERCIAL

## 2025-01-10 VITALS
DIASTOLIC BLOOD PRESSURE: 75 MMHG | BODY MASS INDEX: 24.66 KG/M2 | HEART RATE: 71 BPM | WEIGHT: 148 LBS | SYSTOLIC BLOOD PRESSURE: 118 MMHG | HEIGHT: 65 IN

## 2025-01-10 DIAGNOSIS — N52.8 OTHER MALE ERECTILE DYSFUNCTION: ICD-10-CM

## 2025-01-10 DIAGNOSIS — R39.9 URINARY SYMPTOM OR SIGN: ICD-10-CM

## 2025-01-10 DIAGNOSIS — N40.0 BENIGN PROSTATIC HYPERPLASIA, UNSPECIFIED WHETHER LOWER URINARY TRACT SYMPTOMS PRESENT: Primary | ICD-10-CM

## 2025-01-10 DIAGNOSIS — N40.0 BENIGN PROSTATIC HYPERPLASIA, UNSPECIFIED WHETHER LOWER URINARY TRACT SYMPTOMS PRESENT: ICD-10-CM

## 2025-01-10 LAB
POC APPEARANCE, URINE: CLEAR
POC BILIRUBIN, URINE: NEGATIVE
POC BLOOD, URINE: NEGATIVE
POC COLOR, URINE: YELLOW
POC GLUCOSE, URINE: NEGATIVE MG/DL
POC KETONES, URINE: NEGATIVE MG/DL
POC LEUKOCYTES, URINE: NEGATIVE
POC NITRITE,URINE: NEGATIVE
POC PH, URINE: 5 PH
POC PROTEIN, URINE: NEGATIVE MG/DL
POC SPECIFIC GRAVITY, URINE: 1.02
POC UROBILINOGEN, URINE: 0.2 EU/DL
PSA SERPL-MCNC: 5.01 NG/ML

## 2025-01-10 PROCEDURE — 3008F BODY MASS INDEX DOCD: CPT | Performed by: UROLOGY

## 2025-01-10 PROCEDURE — 84153 ASSAY OF PSA TOTAL: CPT

## 2025-01-10 PROCEDURE — 3074F SYST BP LT 130 MM HG: CPT | Performed by: UROLOGY

## 2025-01-10 PROCEDURE — 81003 URINALYSIS AUTO W/O SCOPE: CPT | Performed by: UROLOGY

## 2025-01-10 PROCEDURE — 99213 OFFICE O/P EST LOW 20 MIN: CPT | Performed by: UROLOGY

## 2025-01-10 PROCEDURE — 3078F DIAST BP <80 MM HG: CPT | Performed by: UROLOGY

## 2025-01-10 NOTE — PROGRESS NOTES
01/10/2025  Voiding well, Viagra working    Patient has no nausea, no vomiting, no fever.    MAHENDRA: Deferred    PSA: Pending    We discussed the effectiveness of Viagra, increased dose to 100 mg  We discussed the benign prostate hypertrophy, PSA screening, normal PSA  All the questions were answered, the patient expressed understanding and agreed to the plan.     Impression  ED  BPH  PSA screening     Plan  PSA now and in a year  Continue Viagra 100 mg twice a week as needed, call for refill  Yearly MAHENDRA and PSA    Chief Complaint   Patient presents with    Follow-up     Pt is here for his yearly follow up male erectile dysfunction    ED   BPH  PSA screening             Physical Exam     TODAYS LAB RESULTS:    OCT UA Automated manually resulted  Order: [id number]   Status: Final result       Visible to patient: Yes (not seen)       Next appt: 03/18/2025 at 08:30 AM in Primary [facility] (Arelis Zee, APRN-CNS)       Dx: Urinary symptom or sign    0 Result Notes       Component  Ref Range & Units 07:03 1 yr ago   POC Color, Urine  Straw, Yellow, Light-Yellow Yellow    POC Appearance, Urine  Clear Clear    POC Glucose, Urine  NEGATIVE mg/dl NEGATIVE NEGATIVE   POC Bilirubin, Urine  NEGATIVE NEGATIVE NEGATIVE   POC Ketones, Urine  NEGATIVE mg/dl NEGATIVE NEGATIVE   POC Specific Gravity, Urine  1.005 - 1.035 1.025 1.020   POC Blood, Urine  NEGATIVE NEGATIVE NEGATIVE   POC PH, Urine  No Reference Range Established PH 5.0 5.5   POC Protein, Urine  NEGATIVE mg/dl NEGATIVE NEGATIVE R   POC Urobilinogen, Urine  0.2, 1.0 EU/DL 0.2 0.2   Poc Nitrite, Urine  NEGATIVE NEGATIVE NEGATIVE   POC Leukocytes, Urine  NEGATIVE NEGATIVE         mponent  Ref Range & Units 1 yr ago 3 yr ago   Prostate Specific AG  <=4.00 ng/mL 2.53 2.14 R, CM   Resulting Agency [facility]     ASSESSMENT&PLAN:      IMPRESSIONS:          01/03/2023  Voiding well, Viagra worked well 100 mg     Patient has no nausea, no vomiting, no fever.      MAHENDRA: Deferred     PSA: Normal        We discussed the effectiveness of Viagra, increased dose to 100 mg  We discussed the benign prostate hypertrophy, PSA screening, normal PSA  All the questions were answered, the patient expressed understanding and agreed to the plan.     Impression  ED  BPH  PSA screening     Plan  Change to Viagra 100 mg twice a week as needed  Yearly MAHENDRA and PSA             Chief Complaint   Patient presents with    Benign Prostatic Hypertrophy       Voiding well.     Erectile Dysfunction       Patient is happy with Viagra 100 mg         Physical Exam      TODAYS LAB RESULTS:     PSA 11/13/2023  2.53     POC Glucose, Urine  NEGATIVE mg/dl NEGATIVE   POC Bilirubin, Urine  NEGATIVE NEGATIVE   POC Ketones, Urine  NEGATIVE mg/dl NEGATIVE   POC Specific Gravity, Urine  1.005 - 1.035 1.020   POC Blood, Urine  NEGATIVE NEGATIVE   POC PH, Urine  No Reference Range Established PH 5.5   POC Protein, Urine  NEGATIVE, 30 (1+) mg/dl NEGATIVE   POC Urobilinogen, Urine  0.2, 1.0 EU/DL 0.2   Poc Nitrite, Urine  NEGATIVE NEGATIVE   POC Leukocytes, Urine  NEGATIVE NEGATIVE         ASSESSMENT&PLAN:        IMPRESSIONS:      12/29/2022  50 mg Viagra worked partially, some headache; status post back surgery     Patient has no nausea, no vomiting, no fever.     Patient here today for 2 month follow up on ED, BPH. Patient was given script for Viagra 50mg PRN.   PSA 11/09/22 1.53  Patient states the Viagra is not helping how he would like it to.   He had back surgery done 11/22/22 per Dr. Lin, he is still having pain due to this. Otherwise patient states he is doing well.   -JMorgenstern CMA     We discussed the effectiveness of Viagra, increased dose to 100 mg  We discussed the benign prostate hypertrophy, PSA screening, normal PSA  All the questions were answered, the patient expressed understanding and agreed to the plan.     Impression  ED  BPH  PSA screening     Plan  Change to Viagra 100 mg twice a week as  needed  Yearly MAHENDRA and PSA           10/31/2022  62-year-old gentleman developed erectile dysfunction over a year, no voiding complaints     Patient has no nausea, no vomiting, no fever.     MAHENDRA: 1+ no nodule     Exam: Circumcised, normal testes normal penis     Patient here today as a new patient.  Referral from Dr. Geneva Skelton for Erectile dysfunction.     IO Glucose - Urine Negative REQUIRED    IO Bilirubin Negative REQUIRED    IO Ketones Negative REQUIRED    IO Specific Gravity 1.020 REQUIRED    IO Blood Negative REQUIRED    IO pH 6.0 REQUIRED    IO Protein, Urine Negative REQUIRED    IO Urobilinogen Normal (0.2-1.0 mg/dl) REQUIRED    IO Nitrite, Urine Negative REQUIRED    IO Leukocytes Negative REQUIRED      Marylin Derrit CMA     We discussed erectile dysfunction, multifactorial including smoking high blood pressure, age etc.  We discussed Viagra trial indication risk of benefit side effect  We discussed benign prostate hypertrophy and PSA screening  All the questions were answered, the patient expressed understanding and agreed to the plan.     Impression  ED  BPH  PSA screening     Plan  PSA check  Viagra 50 mg twice a week as needed x5, call back for refill  Appointment in 2 months        PSA   11/13/2023  2.53  11/09/22 1.53

## 2025-01-15 ENCOUNTER — TELEPHONE (OUTPATIENT)
Dept: CARDIOLOGY | Facility: HOSPITAL | Age: 65
End: 2025-01-15
Payer: COMMERCIAL

## 2025-01-15 NOTE — TELEPHONE ENCOUNTER
1/15/25  0917  Called and clarified that generic okay for carvedilol; spoke [employer] and Kristan, pharmacist.

## 2025-01-22 ENCOUNTER — APPOINTMENT (OUTPATIENT)
Dept: PRIMARY CARE | Facility: CLINIC | Age: 65
End: 2025-01-22
Payer: COMMERCIAL

## 2025-02-05 ENCOUNTER — HOSPITAL ENCOUNTER (OUTPATIENT)
Dept: RADIOLOGY | Facility: CLINIC | Age: 65
Discharge: HOME | End: 2025-02-05
Payer: COMMERCIAL

## 2025-02-05 ENCOUNTER — APPOINTMENT (OUTPATIENT)
Dept: PRIMARY CARE | Facility: CLINIC | Age: 65
End: 2025-02-05
Payer: COMMERCIAL

## 2025-02-05 VITALS
DIASTOLIC BLOOD PRESSURE: 79 MMHG | HEIGHT: 65 IN | TEMPERATURE: 96.9 F | WEIGHT: 144 LBS | RESPIRATION RATE: 16 BRPM | HEART RATE: 65 BPM | SYSTOLIC BLOOD PRESSURE: 118 MMHG | BODY MASS INDEX: 23.99 KG/M2 | OXYGEN SATURATION: 98 %

## 2025-02-05 DIAGNOSIS — M79.89 PAIN AND SWELLING OF RIGHT LOWER LEG: Primary | ICD-10-CM

## 2025-02-05 DIAGNOSIS — M79.661 PAIN AND SWELLING OF RIGHT LOWER LEG: Primary | ICD-10-CM

## 2025-02-05 DIAGNOSIS — M79.89 PAIN AND SWELLING OF RIGHT LOWER LEG: ICD-10-CM

## 2025-02-05 DIAGNOSIS — M79.661 PAIN AND SWELLING OF RIGHT LOWER LEG: ICD-10-CM

## 2025-02-05 DIAGNOSIS — M25.552 PAIN OF LEFT HIP: ICD-10-CM

## 2025-02-05 PROCEDURE — 3074F SYST BP LT 130 MM HG: CPT | Performed by: STUDENT IN AN ORGANIZED HEALTH CARE EDUCATION/TRAINING PROGRAM

## 2025-02-05 PROCEDURE — 93971 EXTREMITY STUDY: CPT

## 2025-02-05 PROCEDURE — 99214 OFFICE O/P EST MOD 30 MIN: CPT | Performed by: STUDENT IN AN ORGANIZED HEALTH CARE EDUCATION/TRAINING PROGRAM

## 2025-02-05 PROCEDURE — 3008F BODY MASS INDEX DOCD: CPT | Performed by: STUDENT IN AN ORGANIZED HEALTH CARE EDUCATION/TRAINING PROGRAM

## 2025-02-05 PROCEDURE — 3078F DIAST BP <80 MM HG: CPT | Performed by: STUDENT IN AN ORGANIZED HEALTH CARE EDUCATION/TRAINING PROGRAM

## 2025-02-05 SDOH — ECONOMIC STABILITY: FOOD INSECURITY: WITHIN THE PAST 12 MONTHS, YOU WORRIED THAT YOUR FOOD WOULD RUN OUT BEFORE YOU GOT MONEY TO BUY MORE.: NEVER TRUE

## 2025-02-05 SDOH — ECONOMIC STABILITY: FOOD INSECURITY: WITHIN THE PAST 12 MONTHS, THE FOOD YOU BOUGHT JUST DIDN'T LAST AND YOU DIDN'T HAVE MONEY TO GET MORE.: NEVER TRUE

## 2025-02-05 ASSESSMENT — ENCOUNTER SYMPTOMS
FEVER: 0
PALPITATIONS: 0
COLOR CHANGE: 0
UNEXPECTED WEIGHT CHANGE: 0
BACK PAIN: 1
CHILLS: 0
NAUSEA: 0
COUGH: 0
SHORTNESS OF BREATH: 0
DIARRHEA: 0
VOMITING: 0
HEADACHES: 0
ARTHRALGIAS: 1
WHEEZING: 0
CONSTIPATION: 0
ABDOMINAL PAIN: 0
FATIGUE: 0
DIZZINESS: 0
LOSS OF SENSATION IN FEET: 1
DEPRESSION: 0
OCCASIONAL FEELINGS OF UNSTEADINESS: 1
CONFUSION: 0

## 2025-02-05 ASSESSMENT — LIFESTYLE VARIABLES
HOW OFTEN DO YOU HAVE SIX OR MORE DRINKS ON ONE OCCASION: LESS THAN MONTHLY
HOW MANY STANDARD DRINKS CONTAINING ALCOHOL DO YOU HAVE ON A TYPICAL DAY: 7 TO 9
SKIP TO QUESTIONS 9-10: 0
HOW OFTEN DO YOU HAVE A DRINK CONTAINING ALCOHOL: 4 OR MORE TIMES A WEEK
AUDIT-C TOTAL SCORE: 8

## 2025-02-05 ASSESSMENT — PATIENT HEALTH QUESTIONNAIRE - PHQ9
2. FEELING DOWN, DEPRESSED OR HOPELESS: NOT AT ALL
1. LITTLE INTEREST OR PLEASURE IN DOING THINGS: NOT AT ALL
SUM OF ALL RESPONSES TO PHQ9 QUESTIONS 1 & 2: 0

## 2025-02-05 NOTE — PROGRESS NOTES
"Subjective   Patient ID: Isael Haji is a 64 y.o. male who presents for Sick Visit (Pt c/o swelling right lower leg and painful veins in the ankle area.  Pt also has pain in right hip.  States he can't sleep.  Taking gabapentin at night but it isn't helping).    HPI   MM pt here for sick visit.  Here with friend. He complain of right leg swelling and painful vein in the ankle area x 2-3 mo.  Also complaining of L hip pain all the time. Taking gabapentin 900 mg at bedtime w/ minimal help, rating 9/10 & is affecting his sleep. Prev saw ortho for back pain s/p surgery. Reports he is still having back pain, attended PT w/o help. He is already following with pain mgmt.     Review of Systems   Constitutional:  Negative for chills, fatigue, fever and unexpected weight change.   HENT: Negative.     Respiratory:  Negative for cough, shortness of breath and wheezing.    Cardiovascular:  Positive for leg swelling. Negative for chest pain and palpitations.   Gastrointestinal:  Negative for abdominal pain, constipation, diarrhea, nausea and vomiting.   Musculoskeletal:  Positive for arthralgias and back pain.   Skin:  Negative for color change and rash.   Neurological:  Negative for dizziness and headaches.   Psychiatric/Behavioral:  Negative for behavioral problems and confusion.        Objective   /79 (BP Location: Left arm, Patient Position: Sitting, BP Cuff Size: Adult)   Pulse 65   Temp 36.1 °C (96.9 °F) (Temporal)   Resp 16   Ht 1.651 m (5' 5\")   Wt 65.3 kg (144 lb)   SpO2 98%   BMI 23.96 kg/m²     Physical Exam  Vitals and nursing note reviewed.   Constitutional:       General: He is not in acute distress.     Appearance: Normal appearance. He is not ill-appearing.      Comments: Female friend in the room   Cardiovascular:      Rate and Rhythm: Normal rate and regular rhythm.      Pulses: Normal pulses.      Heart sounds: Normal heart sounds.   Pulmonary:      Effort: Pulmonary effort is normal.      " Breath sounds: Normal breath sounds. No wheezing or rhonchi.   Abdominal:      General: Abdomen is flat. Bowel sounds are normal.      Palpations: Abdomen is soft.   Musculoskeletal:         General: Normal range of motion.      Comments: Back: Mild tender to palpate across the lower back.  Left hip: Moderate tender to palpate, ROM slightly limited due to pain.  SLR negative in bilateral legs.  Patient ambulating normally.    Right lower leg: Mild to moderate tender to palpate at posterior calf and ankle area; has slightly prominent superficial vein at ankle area.  Otherwise no swelling or skin changes noted.  Left leg appears normal.   Neurological:      General: No focal deficit present.      Mental Status: He is alert.   Psychiatric:         Mood and Affect: Mood normal.         Behavior: Behavior normal.         Assessment/Plan   MM patient here for sick visit.  Female friend in the room.  His left hip pain likely from OA in the setting of chronicity VS worsening of degenerative changes of lower back; will put referral to see Ortho hip for further evaluation and management as needed.  Patient will continue follow-up with the pain team as usual or sooner if needed.  Continue gabapentin as usual for now.  Unclear etiology of his right leg calf and ankle pain, likely MSK in origin in absence of swelling, skin changes and duration however other etiology as DVT VS superficial thrombophlebitis cannot be ruled out completely.  Will obtain stat vascular duplex to rule out DVT/other pathologies.  Advised to seek ER care if symptom worsens.  Recommend Tylenol/ibuprofen as needed for pain.    Problem List Items Addressed This Visit    None  Visit Diagnoses         Codes    Pain and swelling of right lower leg    -  Primary M79.661, M79.89    Relevant Orders    Vascular US lower extremity venous duplex right (Completed)    Pain of left hip     M25.552    Relevant Orders    Referral to Orthopaedic Surgery             This  note was partially generated using the Dragon Voice recognition system. There may be some incorrect wording, spelling and/or spelling errors or punctuation errors that were not corrected prior to committing the note to the medical record.      MD SANDRA Duckworth, Family Medicine

## 2025-02-14 DIAGNOSIS — M25.552 LEFT HIP PAIN: ICD-10-CM

## 2025-02-19 ENCOUNTER — OFFICE VISIT (OUTPATIENT)
Dept: ORTHOPEDIC SURGERY | Facility: HOSPITAL | Age: 65
End: 2025-02-19
Payer: COMMERCIAL

## 2025-02-19 ENCOUNTER — HOSPITAL ENCOUNTER (OUTPATIENT)
Dept: RADIOLOGY | Facility: HOSPITAL | Age: 65
Discharge: HOME | End: 2025-02-19
Payer: COMMERCIAL

## 2025-02-19 VITALS — WEIGHT: 144 LBS | HEIGHT: 65 IN | BODY MASS INDEX: 23.99 KG/M2

## 2025-02-19 DIAGNOSIS — M25.552 LEFT HIP PAIN: ICD-10-CM

## 2025-02-19 DIAGNOSIS — M25.552 PAIN OF LEFT HIP: ICD-10-CM

## 2025-02-19 PROCEDURE — 73502 X-RAY EXAM HIP UNI 2-3 VIEWS: CPT | Mod: LT

## 2025-02-19 PROCEDURE — 99214 OFFICE O/P EST MOD 30 MIN: CPT | Performed by: SPECIALIST

## 2025-02-19 PROCEDURE — 3008F BODY MASS INDEX DOCD: CPT | Performed by: SPECIALIST

## 2025-02-19 ASSESSMENT — ENCOUNTER SYMPTOMS
LOSS OF SENSATION IN FEET: 0
DEPRESSION: 0
OCCASIONAL FEELINGS OF UNSTEADINESS: 1

## 2025-02-19 ASSESSMENT — PAIN - FUNCTIONAL ASSESSMENT: PAIN_FUNCTIONAL_ASSESSMENT: 0-10

## 2025-02-19 ASSESSMENT — PAIN SCALES - GENERAL: PAINLEVEL_OUTOF10: 5 - MODERATE PAIN

## 2025-02-19 NOTE — PROGRESS NOTES
NPV Referred by Dr. Dejan Peña M.D. Left Hip Pain   Pain radiates all he way down his leg  No surgery / no injury  XR Done Today       GENERAL: A/Ox3, NAD. Appears healthy, well nourished  SKIN: no erythema, rashes, or ecchymoses     MUSCULOSKELETAL:  Laterality: Bilateral Hip Exam  - ROM, Extension: full, no flexion contracture  - Strength: Abduction 5/5, Flexion 5/5. Abductor pain against resistance: Intact  - Palpation:  TTP along greater trochanter, posterolateral border  - Log roll/IR exam: non painful, good IR  - Straight leg raise: Positive  - EHL/PF/DF motor intact  - Gait: normal, negative Trendelenburg  - Special Tests: Mild perineal muscle weakness on the left    RADIOGRAPHS: Hips with minimal to no degenerative change or acute abnormalities on x-ray    ASSESSMENT Chronic lumbar radiculopathy with secondary IT band syndrome    PLAN I offered the patient injections today and a course of physical therapy.  Most importantly I suggested he be seen by his prior spine caregivers as that is the primary underlying problem.  Mostly he is reassured his hip joints are okay today.    This note was dictated using speech recognition software and was not corrected for spelling or grammatical errors

## 2025-03-06 ENCOUNTER — TELEPHONE (OUTPATIENT)
Dept: CARDIOLOGY | Facility: HOSPITAL | Age: 65
End: 2025-03-06
Payer: COMMERCIAL

## 2025-03-06 DIAGNOSIS — I25.119 CORONARY ARTERY DISEASE INVOLVING NATIVE HEART WITH ANGINA PECTORIS, UNSPECIFIED VESSEL OR LESION TYPE: ICD-10-CM

## 2025-03-06 DIAGNOSIS — E78.2 MIXED HYPERLIPIDEMIA: ICD-10-CM

## 2025-03-06 DIAGNOSIS — I25.10 ATHEROSCLEROSIS OF NATIVE CORONARY ARTERY OF NATIVE HEART WITHOUT ANGINA PECTORIS: ICD-10-CM

## 2025-03-06 RX ORDER — ATORVASTATIN CALCIUM 40 MG/1
40 TABLET, FILM COATED ORAL DAILY
Qty: 90 TABLET | Refills: 1 | Status: SHIPPED | OUTPATIENT
Start: 2025-03-06

## 2025-03-06 NOTE — TELEPHONE ENCOUNTER
Patient called in asking for Medication refill of Atorvastatin, wanted to know if instead of cutting 80mg in half if 40mg could be sent.      Please send to [facility], as home delivery has not sent yet and is almost out.

## 2025-03-11 DIAGNOSIS — E78.5 HYPERLIPIDEMIA, UNSPECIFIED HYPERLIPIDEMIA TYPE: ICD-10-CM

## 2025-03-12 RX ORDER — EZETIMIBE 10 MG/1
10 TABLET ORAL DAILY
Qty: 90 TABLET | Refills: 3 | Status: SHIPPED | OUTPATIENT
Start: 2025-03-12

## 2025-03-17 ENCOUNTER — APPOINTMENT (OUTPATIENT)
Dept: PULMONOLOGY | Facility: HOSPITAL | Age: 65
End: 2025-03-17
Payer: COMMERCIAL

## 2025-03-17 VITALS
HEART RATE: 56 BPM | RESPIRATION RATE: 16 BRPM | SYSTOLIC BLOOD PRESSURE: 122 MMHG | TEMPERATURE: 97.7 F | WEIGHT: 144 LBS | BODY MASS INDEX: 23.99 KG/M2 | OXYGEN SATURATION: 97 % | DIASTOLIC BLOOD PRESSURE: 82 MMHG | HEIGHT: 65 IN

## 2025-03-17 DIAGNOSIS — J44.9 CHRONIC OBSTRUCTIVE PULMONARY DISEASE, UNSPECIFIED COPD TYPE (MULTI): Primary | ICD-10-CM

## 2025-03-17 DIAGNOSIS — F17.210 CIGARETTE SMOKER: ICD-10-CM

## 2025-03-17 PROCEDURE — 3008F BODY MASS INDEX DOCD: CPT | Performed by: NURSE PRACTITIONER

## 2025-03-17 PROCEDURE — 3074F SYST BP LT 130 MM HG: CPT | Performed by: NURSE PRACTITIONER

## 2025-03-17 PROCEDURE — 99213 OFFICE O/P EST LOW 20 MIN: CPT | Performed by: NURSE PRACTITIONER

## 2025-03-17 PROCEDURE — 3079F DIAST BP 80-89 MM HG: CPT | Performed by: NURSE PRACTITIONER

## 2025-03-17 RX ORDER — ALBUTEROL SULFATE 90 UG/1
2 INHALANT RESPIRATORY (INHALATION) EVERY 4 HOURS PRN
Qty: 18 G | Refills: 11 | Status: SHIPPED | OUTPATIENT
Start: 2025-03-17

## 2025-03-17 ASSESSMENT — ENCOUNTER SYMPTOMS
CHILLS: 0
RHINORRHEA: 0
FATIGUE: 0
SHORTNESS OF BREATH: 1
COUGH: 1
UNEXPECTED WEIGHT CHANGE: 0
WHEEZING: 0
FEVER: 0

## 2025-03-17 NOTE — PATIENT INSTRUCTIONS
Continue on Anoro one puff once a day.   Continue albuterol as needed.  Please get CT scan of your chest in October.   Call with any questions or concerns.  Follow up with me in November.

## 2025-03-17 NOTE — PROGRESS NOTES
Subjective   Patient ID: Isael Haji is a 64 y.o. male who presents for COPD follow up.     HPI: Patient has PMHx s/f CAD (reports prior stent but none visualized on last cardiac cath in 2015, moderate non obstructive disease then), CVA last year, COPD (no PFTs, no baseline O2 requirement), HTN and DLD. I saw him as a hospital wellness patient in the past. He states that he is getting short of breath on any activity. He is coughing up thick sputum every day as well. He is currently on Spiriva and using his albuterol about 5 times per day but does not feel that it is helping. He only reports occasional sinus drainage and congestion. He states he had sleep study and will be started on CPAP with neurology, it has been mailed to him they said. PFTs were done in January did show air trapping with RV 13. He denies any fever, chills, chest pain, leg swelling, unintentional weight loss, or hemoptysis. He is a current smoker at 1/2 ppd but has mostly smoked at 2 ppd , he started at 10 years old. He states that he has not smoked for the past 3 days. He is currently dependent on ETOH at 6-8 beers per day. 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     Today he is here for follow up. He states that his breathing has remained stable on Anoro and typically uses albuterol 2-3 times per day. He is still smoking at 1 ppd. He gets an occasional cough. He has no other concerns.     Review of Systems   Constitutional:  Negative for chills, fatigue, fever and unexpected weight change.   HENT:  Negative for congestion, postnasal drip and rhinorrhea.    Respiratory:  Positive for cough (denies hemoptysis.) and shortness of breath. Negative for wheezing.    Cardiovascular:  Negative for chest pain and leg swelling.   All other systems reviewed and are negative.      Objective   Physical Exam  Vitals reviewed.   Constitutional:       Appearance: Normal appearance.   HENT:      Head: Normocephalic.   Cardiovascular:       Rate and Rhythm: Normal rate and regular rhythm.   Pulmonary:      Effort: Pulmonary effort is normal.      Breath sounds: Decreased breath sounds present.   Skin:     General: Skin is warm and dry.   Neurological:      Mental Status: He is alert.       Assessment/Plan      1. COPD, mild  2. SHAHZAD  3. Nicotine dependence  4. S/P CVA      Plan     -PFTs done in January 2021 with air trapping, , no obstruction. Patient has chronic bronchitis clinically as well. Continue Anoro and albuterol prn. AAT phenotype not determined but levels are normal. Repeat PFTs done October 2023 with mild COPD. FEV1/FVC 67, FEV1 110.      -He had a sleep study done with neurology and with mild SHAHZAD. He has not been able to tolerate CPAP due to leg cramping at night, he is following up with Dr. Jose.      -He is currently smoking at 1-1.5 ppd at this time, he has smoked at 2 ppd mostly and started when he was 10 years old. Smoking cessation counseling provided for 5 min, he declines any NRT, he does still want to quit. LDCT was done October 2023 and no suspicious nodules, recommend continue yearly screening. This was done October 2024 and was reported a new 3 mm nodule that appeared to be mucous impaction then small mildly thick walled cyst in the RUL new since 2015 recommendation was for 6 month follow up. I had reviewed this with pulmonologist and interventional radiologist who agreed no indication to repeat this in 6 months, we decided to continue yearly screening October 2025, I ordered this today.     Overall I will continue with current regimen. We will get LDCT in October and I will see him back in November. I instructed patient to call sooner if needed.      Total time:  25 min.

## 2025-03-18 ENCOUNTER — APPOINTMENT (OUTPATIENT)
Dept: PRIMARY CARE | Facility: CLINIC | Age: 65
End: 2025-03-18
Payer: COMMERCIAL

## 2025-03-18 VITALS
OXYGEN SATURATION: 95 % | BODY MASS INDEX: 23.82 KG/M2 | RESPIRATION RATE: 18 BRPM | TEMPERATURE: 97.7 F | WEIGHT: 143 LBS | HEART RATE: 56 BPM | HEIGHT: 65 IN | DIASTOLIC BLOOD PRESSURE: 82 MMHG | SYSTOLIC BLOOD PRESSURE: 132 MMHG

## 2025-03-18 DIAGNOSIS — Z00.00 ROUTINE GENERAL MEDICAL EXAMINATION AT HEALTH CARE FACILITY: Primary | ICD-10-CM

## 2025-03-18 DIAGNOSIS — Z00.00 ENCOUNTER FOR MEDICARE ANNUAL WELLNESS EXAM: ICD-10-CM

## 2025-03-18 DIAGNOSIS — J44.9 CHRONIC OBSTRUCTIVE PULMONARY DISEASE, UNSPECIFIED COPD TYPE (MULTI): ICD-10-CM

## 2025-03-18 DIAGNOSIS — M62.838 MUSCLE SPASM: ICD-10-CM

## 2025-03-18 DIAGNOSIS — I10 ESSENTIAL HYPERTENSION: ICD-10-CM

## 2025-03-18 DIAGNOSIS — Z00.00 ROUTINE ADULT HEALTH MAINTENANCE: ICD-10-CM

## 2025-03-18 DIAGNOSIS — Z13.29 SCREENING FOR THYROID DISORDER: ICD-10-CM

## 2025-03-18 DIAGNOSIS — F10.20 ALCOHOLISM, CHRONIC (MULTI): ICD-10-CM

## 2025-03-18 PROBLEM — Z86.79 HISTORY OF HYPERTENSION: Status: RESOLVED | Noted: 2024-09-18 | Resolved: 2025-03-18

## 2025-03-18 PROCEDURE — 3079F DIAST BP 80-89 MM HG: CPT

## 2025-03-18 PROCEDURE — 3008F BODY MASS INDEX DOCD: CPT

## 2025-03-18 PROCEDURE — 99213 OFFICE O/P EST LOW 20 MIN: CPT

## 2025-03-18 PROCEDURE — 3075F SYST BP GE 130 - 139MM HG: CPT

## 2025-03-18 PROCEDURE — 99396 PREV VISIT EST AGE 40-64: CPT

## 2025-03-18 PROCEDURE — G0439 PPPS, SUBSEQ VISIT: HCPCS

## 2025-03-18 RX ORDER — METHOCARBAMOL 500 MG/1
250 TABLET, FILM COATED ORAL DAILY PRN
Qty: 90 TABLET | Refills: 1 | Status: SHIPPED | OUTPATIENT
Start: 2025-03-18

## 2025-03-18 SDOH — ECONOMIC STABILITY: FOOD INSECURITY: WITHIN THE PAST 12 MONTHS, YOU WORRIED THAT YOUR FOOD WOULD RUN OUT BEFORE YOU GOT MONEY TO BUY MORE.: NEVER TRUE

## 2025-03-18 SDOH — ECONOMIC STABILITY: FOOD INSECURITY: WITHIN THE PAST 12 MONTHS, THE FOOD YOU BOUGHT JUST DIDN'T LAST AND YOU DIDN'T HAVE MONEY TO GET MORE.: NEVER TRUE

## 2025-03-18 SDOH — ECONOMIC STABILITY: FOOD INSECURITY: WITHIN THE PAST 12 MONTHS, THE FOOD YOU BOUGHT JUST DIDN'T LAST AND YOU DIDN'T HAVE MONEY TO GET MORE.: SOMETIMES TRUE

## 2025-03-18 SDOH — ECONOMIC STABILITY: FOOD INSECURITY: WITHIN THE PAST 12 MONTHS, YOU WORRIED THAT YOUR FOOD WOULD RUN OUT BEFORE YOU GOT MONEY TO BUY MORE.: SOMETIMES TRUE

## 2025-03-18 ASSESSMENT — ENCOUNTER SYMPTOMS
HEMATOLOGIC/LYMPHATIC NEGATIVE: 1
OCCASIONAL FEELINGS OF UNSTEADINESS: 1
CONSTITUTIONAL NEGATIVE: 1
CARDIOVASCULAR NEGATIVE: 1
EYES NEGATIVE: 1
PSYCHIATRIC NEGATIVE: 1
NEUROLOGICAL NEGATIVE: 1
ENDOCRINE NEGATIVE: 1
RESPIRATORY NEGATIVE: 1
LOSS OF SENSATION IN FEET: 0
GASTROINTESTINAL NEGATIVE: 1
MUSCULOSKELETAL NEGATIVE: 1
DEPRESSION: 0

## 2025-03-18 ASSESSMENT — ACTIVITIES OF DAILY LIVING (ADL)
DOING_HOUSEWORK: INDEPENDENT
TAKING_MEDICATION: INDEPENDENT
BATHING: INDEPENDENT
MANAGING_FINANCES: INDEPENDENT
DRESSING: INDEPENDENT
GROCERY_SHOPPING: INDEPENDENT

## 2025-03-18 ASSESSMENT — PATIENT HEALTH QUESTIONNAIRE - PHQ9
SUM OF ALL RESPONSES TO PHQ9 QUESTIONS 1 & 2: 2
1. LITTLE INTEREST OR PLEASURE IN DOING THINGS: SEVERAL DAYS
2. FEELING DOWN, DEPRESSED OR HOPELESS: SEVERAL DAYS
10. IF YOU CHECKED OFF ANY PROBLEMS, HOW DIFFICULT HAVE THESE PROBLEMS MADE IT FOR YOU TO DO YOUR WORK, TAKE CARE OF THINGS AT HOME, OR GET ALONG WITH OTHER PEOPLE: SOMEWHAT DIFFICULT

## 2025-03-18 ASSESSMENT — PAIN SCALES - GENERAL: PAINLEVEL_OUTOF10: 0-NO PAIN

## 2025-03-18 ASSESSMENT — LIFESTYLE VARIABLES
HOW OFTEN DO YOU HAVE A DRINK CONTAINING ALCOHOL: 4 OR MORE TIMES A WEEK
HOW MANY STANDARD DRINKS CONTAINING ALCOHOL DO YOU HAVE ON A TYPICAL DAY: 10 OR MORE
AUDIT-C TOTAL SCORE: 12
SKIP TO QUESTIONS 9-10: 0
HOW OFTEN DO YOU HAVE SIX OR MORE DRINKS ON ONE OCCASION: DAILY OR ALMOST DAILY

## 2025-03-18 NOTE — ASSESSMENT & PLAN NOTE
Continues to drink 10-12 beers per day  Liver enzymes improved on labs 9/2024 compared to 5/2024 but AST remains mildly elevated  Discussed implications of continued alcoholism on his health including liver cirrhosis/failure and B12/folate deficiencies    Obtain B12, folate, mag, liver enzymes  Check liver US to evaluate for cirrhosis  
Flu vaccination: Recommended annually  Pneumococcal 23v: Previously given  PCV 13/20: Previously given  Shingrix vaccine: 1/2 completed shingrix  RSV: Recommended to obtain from local pharmacy  Colon cancer screening: Repeat in 2028  PSA: Per Dr. Osman  
Normotensive in office at 132/82  Home blood pressures running between 110-160/60-90s    Continue lisinopril 40mg daily, amlodipine 10mg daily, carvedilol 6.25mg BID      
99

## 2025-03-18 NOTE — PROGRESS NOTES
Subjective   Patient ID: Isael Haji is a 64 y.o. male who presents for medicare wellness visit and 6 month follow up of hypertension.    Home blood pressure running 110-160/60-90s.     Diet: sandwiches, pork chops cooked in crock pot. Fried chicken monthly. Pork loins. Beef hamburgers and steak x3 times per month. Greenbeans. No at lot of processed foods. X2 cups of coffee per day with milk. Sweat tea 20oz bottle per day.   Drinking 10-12 beers per day  Exercise: No regular exercise, due to COPD, easily short of breath  Weight: stable  Water: 2-3 bottle per day  Sleep: Not good due to leg cramping. Six hours of sleep.   Social: . 1 son, near by. Mobile home. Sister lives with him, has a pit bull. Reports friend of his helps manage his medications- pharmacy training.   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     Review of Systems   Constitutional: Negative.    HENT: Negative.     Eyes: Negative.    Respiratory: Negative.     Cardiovascular: Negative.    Gastrointestinal: Negative.    Endocrine: Negative.    Genitourinary: Negative.    Musculoskeletal: Negative.    Skin: Negative.    Neurological: Negative.    Hematological: Negative.    Psychiatric/Behavioral: Negative.          Current Outpatient Medications   Medication Sig Dispense Refill    albuterol 90 mcg/actuation inhaler Inhale 2 puffs every 4 hours if needed for wheezing. 18 g 11    amLODIPine (Norvasc) 10 mg tablet Take 1 tablet (10 mg) by mouth once daily. 90 tablet 3    aspirin 81 mg chewable tablet Chew 1 tablet (81 mg) once daily. Chew and swallow.      atorvastatin (Lipitor) 40 mg tablet Take 1 tablet (40 mg) by mouth once daily. 90 tablet 1    carvedilol (Coreg) 6.25 mg tablet Take 1 tablet (6.25 mg) by mouth 2 times daily (morning and late afternoon). 180 tablet 1    ezetimibe (Zetia) 10 mg tablet TAKE ONE TABLET BY MOUTH DAILY AT 9AM 90 tablet 3    gabapentin (Neurontin) 300 mg capsule Take 3 capsules (900 mg) by mouth  once daily at bedtime. 270 capsule 1    lisinopril 40 mg tablet TAKE ONE TABLET BY MOUTH DAILY 90 tablet 3    miscellaneous medical supply (Blood Pressure Cuff) misc 1 Units once daily. 1 each 0    umeclidinium-vilanteroL (Anoro Ellipta) 62.5-25 mcg/actuation blister with device Inhale 1 puff once daily. 3 each 3    methocarbamol (Robaxin) 500 mg tablet Take 0.5 tablets (250 mg) by mouth once daily as needed for muscle spasms. 90 tablet 1    respiratory syncytial virus, RSV, vaccine, adjuvanted, age 60y+ (Arexvy) 120 mcg/0.5 mL suspension for reconstitution Inject 0.5 mL into the muscle 1 time for 1 dose. 0.5 mL 0    sildenafil (Viagra) 100 mg tablet Take 1 tablet (100 mg) by mouth once daily as needed for erectile dysfunction. 10 tablet 3     No current facility-administered medications for this visit.     Past Surgical History:   Procedure Laterality Date    MR HEAD ANGIO WO IV CONTRAST  11/29/2020    MR HEAD ANGIO WO IV CONTRAST 11/29/2020 [facility]    MR NECK ANGIO WO IV CONTRAST  11/29/2020    MR NECK ANGIO WO IV CONTRAST 11/29/2020 [facility]    OTHER SURGICAL HISTORY  12/09/2020    Rotator cuff repair    OTHER SURGICAL HISTORY  12/09/2020    Cardiac catheterization with stent placement    OTHER SURGICAL HISTORY  12/29/2022    Lower back surgery    OTHER SURGICAL HISTORY  08/31/2021    Arm surgery     Family History   Problem Relation Name Age of Onset    Diabetes Mother      Lung cancer Mother      Coronary artery disease Father        Social History     Tobacco Use    Smoking status: Every Day     Current packs/day: 1.00     Types: Cigarettes     Passive exposure: Current    Smokeless tobacco: Never   Vaping Use    Vaping status: Never Used   Substance Use Topics    Alcohol use: Yes     Alcohol/week: 60.0 standard drinks of alcohol     Types: 60 Cans of beer per week     Comment: depends on day, up to 8-12 a day    Drug use: Never        Objective     Visit Vitals  /82 (BP  "Location: Right arm, Patient Position: Sitting, BP Cuff Size: Adult)   Pulse 56   Temp 36.5 °C (97.7 °F)   Resp 18   Ht 1.651 m (5' 5\")   Wt 64.9 kg (143 lb)   SpO2 95%   BMI 23.80 kg/m²   Smoking Status Every Day   BSA 1.73 m²        Physical Exam  Constitutional:       Appearance: Normal appearance.   HENT:      Head: Normocephalic and atraumatic.   Eyes:      Extraocular Movements: Extraocular movements intact.      Pupils: Pupils are equal, round, and reactive to light.   Cardiovascular:      Rate and Rhythm: Normal rate and regular rhythm.   Pulmonary:      Effort: Pulmonary effort is normal.      Breath sounds: Examination of the right-upper field reveals decreased breath sounds. Decreased breath sounds present.   Abdominal:      General: Abdomen is flat. Bowel sounds are normal.      Palpations: Abdomen is soft.   Musculoskeletal:         General: Normal range of motion.   Skin:     General: Skin is warm and dry.      Capillary Refill: Capillary refill takes less than 2 seconds.   Neurological:      General: No focal deficit present.      Mental Status: He is alert and oriented to person, place, and time.   Psychiatric:         Mood and Affect: Mood normal.         Behavior: Behavior normal.           Assessment/Plan   Problem List Items Addressed This Visit       Alcoholism, chronic (Multi)     Continues to drink 10-12 beers per day  Liver enzymes improved on labs 9/2024 compared to 5/2024 but AST remains mildly elevated  Discussed implications of continued alcoholism on his health including liver cirrhosis/failure and B12/folate deficiencies    Obtain B12, folate, mag, liver enzymes  Check liver US to evaluate for cirrhosis         Relevant Medications    respiratory syncytial virus, RSV, vaccine, adjuvanted, age 60y+ (Arexvy) 120 mcg/0.5 mL suspension for reconstitution    Other Relevant Orders    US abdomen limited liver    Magnesium    Vitamin B12    Folate    Vitamin B1, Whole Blood    Chronic " obstructive pulmonary disease, unspecified    Relevant Medications    respiratory syncytial virus, RSV, vaccine, adjuvanted, age 60y+ (Arexvy) 120 mcg/0.5 mL suspension for reconstitution    Essential hypertension     Normotensive in office at 132/82  Home blood pressures running between 110-160/60-90s    Continue lisinopril 40mg daily, amlodipine 10mg daily, carvedilol 6.25mg BID             Relevant Medications    respiratory syncytial virus, RSV, vaccine, adjuvanted, age 60y+ (Arexvy) 120 mcg/0.5 mL suspension for reconstitution    Other Relevant Orders    CBC    Basic Metabolic Panel    Lipid Panel    Encounter for Medicare annual wellness exam     Flu vaccination: Recommended annually  Pneumococcal 23v: Previously given  PCV 13/20: Previously given  Shingrix vaccine: 1/2 completed shingrix  RSV: Recommended to obtain from local pharmacy  Colon cancer screening: Repeat in 2028  PSA: Per Dr. Osman          Other Visit Diagnoses       Routine general medical examination at health care facility    -  Primary    Relevant Orders    1 Year Follow Up In Primary Care - Wellness Exam    Muscle spasm        Relevant Medications    methocarbamol (Robaxin) 500 mg tablet    Routine adult health maintenance        Screening for thyroid disorder        Relevant Orders    TSH with reflex to Free T4 if abnormal              All pertinent lab work and results were reviewed with patient.     Follow up with me in 6 months    HENRY Moore-CNS

## 2025-03-18 NOTE — PATIENT INSTRUCTIONS
Thank you for coming to see me today.  If you have any questions or concerns following our visit, please contact the office.  Phone: [phone]    Follow up with me in 6 months or sooner as needed    1) Work on cutting back alcohol, goal of no more than 3-5 drinks per week. Cut back by 1 daily drink every month to get to this goal.     2) Get fasting labwork in the 1-2 weeks.  The lab is down the [place] from our office.     3) Please schedule a liver US to evaluate for liver cirrhosis - please call [phone] or schedule at  on your way out today     4) Consider getting Shingrix vaccine series from local pharmacy- 2 shots given 2-6 months apart.  Covered with medicare advantage plans, has about 92% effectiveness at preventing Shingles infection.     5) Consider getting Arexvy vaccine from local pharmacy to protect from RSV    6) Continue checking blood pressure every 3-4 days, record values and bring log to next office visit    
Laps, needles and instruments count was reported as correct.

## 2025-03-24 ENCOUNTER — APPOINTMENT (OUTPATIENT)
Dept: PULMONOLOGY | Facility: HOSPITAL | Age: 65
End: 2025-03-24
Payer: COMMERCIAL

## 2025-04-01 ENCOUNTER — HOSPITAL ENCOUNTER (OUTPATIENT)
Dept: RADIOLOGY | Facility: CLINIC | Age: 65
Discharge: HOME | End: 2025-04-01
Payer: COMMERCIAL

## 2025-04-01 DIAGNOSIS — F10.20 ALCOHOLISM, CHRONIC (MULTI): ICD-10-CM

## 2025-04-01 PROCEDURE — 76705 ECHO EXAM OF ABDOMEN: CPT | Performed by: RADIOLOGY

## 2025-04-01 PROCEDURE — 76705 ECHO EXAM OF ABDOMEN: CPT

## 2025-04-02 ENCOUNTER — OFFICE VISIT (OUTPATIENT)
Dept: PAIN MEDICINE | Facility: HOSPITAL | Age: 65
End: 2025-04-02
Payer: COMMERCIAL

## 2025-04-02 VITALS
BODY MASS INDEX: 24.34 KG/M2 | OXYGEN SATURATION: 96 % | SYSTOLIC BLOOD PRESSURE: 123 MMHG | WEIGHT: 146.1 LBS | HEART RATE: 68 BPM | HEIGHT: 65 IN | DIASTOLIC BLOOD PRESSURE: 79 MMHG | RESPIRATION RATE: 16 BRPM

## 2025-04-02 DIAGNOSIS — M48.07 SPINAL STENOSIS OF LUMBOSACRAL REGION: ICD-10-CM

## 2025-04-02 PROCEDURE — 3074F SYST BP LT 130 MM HG: CPT | Performed by: CLINICAL NURSE SPECIALIST

## 2025-04-02 PROCEDURE — 3078F DIAST BP <80 MM HG: CPT | Performed by: CLINICAL NURSE SPECIALIST

## 2025-04-02 PROCEDURE — G2211 COMPLEX E/M VISIT ADD ON: HCPCS | Performed by: CLINICAL NURSE SPECIALIST

## 2025-04-02 PROCEDURE — 3008F BODY MASS INDEX DOCD: CPT | Performed by: CLINICAL NURSE SPECIALIST

## 2025-04-02 PROCEDURE — 99214 OFFICE O/P EST MOD 30 MIN: CPT | Performed by: CLINICAL NURSE SPECIALIST

## 2025-04-02 RX ORDER — GABAPENTIN 300 MG/1
900 CAPSULE ORAL NIGHTLY
Qty: 270 CAPSULE | Refills: 1 | Status: SHIPPED | OUTPATIENT
Start: 2025-04-02

## 2025-04-02 ASSESSMENT — PAIN SCALES - GENERAL: PAINLEVEL_OUTOF10: 7

## 2025-04-02 NOTE — PROGRESS NOTES
Subjective   Patient ID: Isael Haji is a 64 y.o. male who presents for Back Pain.  HPI    64-year-old male with history of CAD, COPD, CVA, hyperlipidemia, increased LFTs/chronic alcoholism, spinal stenosis status post laminectomy/discectomy and ulnar neuropathy presents for follow-up. Patient has a history of previous lumbar surgery in November 2022 which patient states provided limited relief for back pain. He has completed a formal course of physical therapy including aqua therapy x 2 with limited relief. Increased his gabapentin to 900 mg at bedtime for persistent neuropathic symptoms and radicular pain. Added methocarbamol for muscle tightness and spasms. Patient followed up with Ortho/spine surgeon for reevaluation with notes indicating that the patient needs to stop smoking and decrease alcohol use prior to any consideration of surgical intervention. Not willing to stop smoking or decrease alcohol use at this time. Also discussed the possibility of epidural steroid injections targeting lumbar radicular symptoms. Patient not interested in injections at this time.  Presenting at today's office visit for routine follow-up.  Continues to experience low back pain with radiation into bilateral hips and posteriorly to his lower extremities to the level of his feet.  His pain is accompanied by numbness and tingling in his lower extremities and feet.  Endorses weakness in his lower extremities.  Denies any changes in bowel/bladder function.  Pain accompanied by lower back spasms and muscle tightness.  Describes his pain as aching, throbbing and occasionally sharp.  Patient is having difficulty sleeping secondary to pain.  Standing, bending and walking increases pain.  Managing his pain with gabapentin 900 mg at bedtime which she is tolerating without adverse effects.  Using methocarbamol 1/2 tablet once daily as needed for muscle tightness and spasms.  Patient states he uses sparingly.  Patient states he remains  "active caring for his home.  Currently not consistently doing home therapy exercises and stretches.  States that many of the exercises increases pain. Continues a walking regimen when weather is nice.  He would like to continue to avoid surgical intervention or epidural steroid injections at this time.     Location of Pain: pt is here for interval follow up. Pt has low back pain along with numbness and tingling down the legs and also burning pain at site of surgery. Intermittent pain in bilateral hips. Cramping in legs at bedtime         Pain Score:  \"7/10\"    Other pain medication/neuromodulator: Gabapentin-needs refill/Methocarbamol- no refill needed    Therapeutic Goals: I would like to sleep better    Therapeutic Assessment: I am up most of the night and I cramp up    Injections and/or Procedures: denies    Other: Water therapy twice-not beneficial  OARRS:  Sola Crowder, APRN-CNP, APRN-CNS on 4/2/2025  8:11 AM  I have personally reviewed the OARRS report for Isael Haji. I have considered the risks of abuse, dependence, addiction and diversion          Review of Systems    ROS:   General: No fevers, chills, weight loss  Skin: Negative for lesions  Eyes: No acute vision changes  Ears: No vertigo  Nose, mouth, throat: No difficulty swallowing or speaking  Respiratory: No cough, shortness of breath, cyanosis  Cardiovascular: Negative for chest pain syncope or palpitation  Gastrointestinal: No constipation, nausea, vomiting  Neurological: Negative for headache, positive for: Paresthesia and weakness  Psychological: Negative for severe or debilitating anxiety, depression. Negative memory loss  Musculoskeletal: Positive for arthralgia, myalgia, pain and spasm  Endocrine: Negative for weight gain, appetite changes, excessive sweating  Allergy/immune: Negative    All 13 systems were reviewed and are within normal levels except as noted or in the history of present illness.  Positive or pertinent negative responses " are noted or were in the history of present illness. As noted, the patient denies significant or impairing weakness in the bilateral upper and lower extremities, medication induced constipation, and bowel or bladder incontinence.     Current Outpatient Medications:     albuterol 90 mcg/actuation inhaler, Inhale 2 puffs every 4 hours if needed for wheezing., Disp: 18 g, Rfl: 11    amLODIPine (Norvasc) 10 mg tablet, Take 1 tablet (10 mg) by mouth once daily., Disp: 90 tablet, Rfl: 3    aspirin 81 mg chewable tablet, Chew 1 tablet (81 mg) once daily. Chew and swallow., Disp: , Rfl:     atorvastatin (Lipitor) 40 mg tablet, Take 1 tablet (40 mg) by mouth once daily., Disp: 90 tablet, Rfl: 1    carvedilol (Coreg) 6.25 mg tablet, Take 1 tablet (6.25 mg) by mouth 2 times daily (morning and late afternoon)., Disp: 180 tablet, Rfl: 1    ezetimibe (Zetia) 10 mg tablet, TAKE ONE TABLET BY MOUTH DAILY AT 9AM, Disp: 90 tablet, Rfl: 3    lisinopril 40 mg tablet, TAKE ONE TABLET BY MOUTH DAILY, Disp: 90 tablet, Rfl: 3    methocarbamol (Robaxin) 500 mg tablet, Take 0.5 tablets (250 mg) by mouth once daily as needed for muscle spasms., Disp: 90 tablet, Rfl: 1    miscellaneous medical supply (Blood Pressure Cuff) misc, 1 Units once daily., Disp: 1 each, Rfl: 0    umeclidinium-vilanteroL (Anoro Ellipta) 62.5-25 mcg/actuation blister with device, Inhale 1 puff once daily., Disp: 3 each, Rfl: 3    gabapentin (Neurontin) 300 mg capsule, Take 3 capsules (900 mg) by mouth once daily at bedtime., Disp: 270 capsule, Rfl: 1    sildenafil (Viagra) 100 mg tablet, Take 1 tablet (100 mg) by mouth once daily as needed for erectile dysfunction., Disp: 10 tablet, Rfl: 3     Past Medical History:   Diagnosis Date    Elbow disorder 09/18/2024    Long term (current) use of opiate analgesic 05/04/2022    Long term (current) use of opiate analgesic    Other bursal cyst, unspecified elbow 04/29/2022    Synovial cyst of elbow    Personal history of other  "diseases of the circulatory system 12/09/2020    History of hypertension        Past Surgical History:   Procedure Laterality Date    MR HEAD ANGIO WO IV CONTRAST  11/29/2020    MR HEAD ANGIO WO IV CONTRAST 11/29/2020 [facility]    MR NECK ANGIO WO IV CONTRAST  11/29/2020    MR NECK ANGIO WO IV CONTRAST 11/29/2020 [facility]    OTHER SURGICAL HISTORY  12/09/2020    Rotator cuff repair    OTHER SURGICAL HISTORY  12/09/2020    Cardiac catheterization with stent placement    OTHER SURGICAL HISTORY  12/29/2022    Lower back surgery    OTHER SURGICAL HISTORY  08/31/2021    Arm surgery        Family History   Problem Relation Name Age of Onset    Diabetes Mother      Lung cancer Mother      Coronary artery disease Father          Allergies   Allergen Reactions    Codeine Unknown     Gastrointestinal upset         Objective     Visit Vitals  /79 (BP Location: Right arm)   Pulse 68   Resp 16   Ht 1.651 m (5' 5\")   Wt 66.3 kg (146 lb 1.6 oz)   SpO2 96%   BMI 24.31 kg/m²   Smoking Status Every Day   BSA 1.74 m²        Physical Exam    PE:  General: Well-developed, well-nourished, no acute distress. The patient demonstrates no pain behavior, symptom magnification or overt drug-seeking behavior.  Eye: Pupils appropriate for room lighting  Neck/thyroid: No obvious goiter or enlargement of neck noted  Respiratory exam: Normal respiratory effort, unlabored respiration. No accessory muscle use noted  Cardiac exam: Bilateral radial pulses intact  Abdominal: Nondistended  Spine, lumbar: The patient is able to rise from a seated to standing position with some hesitancy.  Gait is slow, deliberate and slightly forward leaning.  Chronic elevation of left hip and left shoulder.  Tenderness to paraspinous musculature lower lumbar region bilaterally.  Flexion intact with extension more limited and increasing pain.  Positive straight leg raise bilateral lower extremities.   Neurologic exam: Muscle strength is " antigravity in all 4 extremities.  Equal muscle strength bilateral lower extremities 5/5.  Psychiatric exam: Judgment and insight normal, affect normal, speech is fluent, affect appropriate, demonstrating no signs of hypersomnolence, sedation, or confusion        Assessment/Plan   Problem List Items Addressed This Visit             ICD-10-CM    Spinal stenosis of lumbosacral region M48.07       64-year-old male with history of lumbar discectomy in November 2022 which provided limited relief from lumbar radicular symptoms.  Reevaluated by Ortho/spine surgeon for persistent lumbar radicular symptoms.  Ortho/spine surgeon recommending patient decrease alcohol use and proceed with smoking cessation prior to any consideration of further surgery.  Patient not willing to stop smoking or decrease alcohol use at this time.  Also discussed epidural steroid injections targeting lumbar radicular symptoms.  He would need a repeat MRI if he would like to pursue injections.  Patient states that he has done injections in the past and is not interested in pursuing injections at this time.  Managing his pain with gabapentin which we increased to 900 mg at bedtime.  He does feel that the gabapentin is providing some neuropathic pain relief.  Also added methocarbamol 500 mg and instructed the patient to take 1/2 tablet for muscle tightness and spasms once daily.  He states he uses this sparingly.  He does feel the methocarbamol was beneficial for muscle spasms. Presenting at today's office visit for routine follow-up.  Continues to experience low back pain with radiation to bilateral lower extremities accompanied by numbness/tingling.  Endorsing weakness in his lower extremities.  Denies any changes in bowel/bladder function or saddle anesthesia. Patient is doing fairly well with the increased dose of gabapentin and methocarbamol. He has completed a formal course of physical therapy multiple times including aqua therapy.  Admits to not  consistently doing exercises at home.  Advised patient to consistently do home therapy stretches and exercises to strengthen his core/target his low back.  Plan discussed with patient at today's office visit.    -Continue gabapentin to 900 mg at bedtime.  The patient was counseled on the risks and potential side effects of gabapentin as well as its importance for dosage titration. Side effects included but were not limited to, drowsiness, sedation, cognitive decline, peripheral edema, weight gain, seizures, with abrupt withdrawal.  Patient was instructed to call the office with any concerns or side effects before abruptly stopping medication.   -Continue Robaxin 500 mg taking 1/2 tablet daily as needed for muscle spasms.  Encouraged the patient to take this medication at bedtime when muscle spasm/cramping is most intense.  -Further discussion about potential injections including lumbar epidural steroid injections or surgical intervention if symptoms should worsen.  If patient would like to proceed with injections we will repeat lumbar MRI at that time.  -Patient will follow-up in 6 months or sooner if needed.  Advised patient to watch for increasing neurologic symptoms and if noted to be evaluated in the ED.                  Relevant Medications    gabapentin (Neurontin) 300 mg capsule

## 2025-04-02 NOTE — ASSESSMENT & PLAN NOTE
64-year-old male with history of lumbar discectomy in November 2022 which provided limited relief from lumbar radicular symptoms.  Reevaluated by Ortho/spine surgeon for persistent lumbar radicular symptoms.  Ortho/spine surgeon recommending patient decrease alcohol use and proceed with smoking cessation prior to any consideration of further surgery.  Patient not willing to stop smoking or decrease alcohol use at this time.  Also discussed epidural steroid injections targeting lumbar radicular symptoms.  He would need a repeat MRI if he would like to pursue injections.  Patient states that he has done injections in the past and is not interested in pursuing injections at this time.  Managing his pain with gabapentin which we increased to 900 mg at bedtime.  He does feel that the gabapentin is providing some neuropathic pain relief.  Also added methocarbamol 500 mg and instructed the patient to take 1/2 tablet for muscle tightness and spasms once daily.  He states he uses this sparingly.  He does feel the methocarbamol was beneficial for muscle spasms. Presenting at today's office visit for routine follow-up.  Continues to experience low back pain with radiation to bilateral lower extremities accompanied by numbness/tingling.  Endorsing weakness in his lower extremities.  Denies any changes in bowel/bladder function or saddle anesthesia. Patient is doing fairly well with the increased dose of gabapentin and methocarbamol. He has completed a formal course of physical therapy multiple times including aqua therapy.  Admits to not consistently doing exercises at home.  Advised patient to consistently do home therapy stretches and exercises to strengthen his core/target his low back.  Plan discussed with patient at today's office visit.    -Continue gabapentin to 900 mg at bedtime.  The patient was counseled on the risks and potential side effects of gabapentin as well as its importance for dosage titration. Side  effects included but were not limited to, drowsiness, sedation, cognitive decline, peripheral edema, weight gain, seizures, with abrupt withdrawal.  Patient was instructed to call the office with any concerns or side effects before abruptly stopping medication.   -Continue Robaxin 500 mg taking 1/2 tablet daily as needed for muscle spasms.  Encouraged the patient to take this medication at bedtime when muscle spasm/cramping is most intense.  -Further discussion about potential injections including lumbar epidural steroid injections or surgical intervention if symptoms should worsen.  If patient would like to proceed with injections we will repeat lumbar MRI at that time.  -Patient will follow-up in 6 months or sooner if needed.  Advised patient to watch for increasing neurologic symptoms and if noted to be evaluated in the ED.

## 2025-04-08 ENCOUNTER — APPOINTMENT (OUTPATIENT)
Dept: PAIN MEDICINE | Facility: HOSPITAL | Age: 65
End: 2025-04-08
Payer: COMMERCIAL

## 2025-04-17 NOTE — PROGRESS NOTES
[facility] Heart and Vascular Cardiology    Patient Name: Isael Haji  Patient : 1960    Scribe Attestation  By signing my name below, ILu Scribe attest that this documentation has been prepared under the direction and in the presence of Cassius Jeffers DO.    Scribe Attestation  By signing my name below, Callie MORTENSEN Scribe attest that this documentation has been prepared under the direction and in the presence of Cassius Jeffers DO.     Physician Attestation  Cassius MORTENSEN DO, personally performed the services described in the documentation as scribed by Callie Jain, in my presence, and confirm it is both accurate and complete.    Reason for visit:  This is a 64-year-old male here for follow-up regarding his history of coronary artery disease as seen on prior cardiac catheterization, hypertension, dyslipidemia, and COPD/tobacco use.     HPI:  This is a 64-year-old male here for follow-up regarding his history of coronary artery disease as seen on prior cardiac catheterization, hypertension, dyslipidemia, and COPD/tobacco use.  The patient was last evaluated by me in May 2024.  At that visit I increased amlodipine to 10 mg daily, ordered blood work including CMP/lipid/magnesium/CBC, and asked the patient to follow-up in 1 year and sooner if necessary.  CMP done 2025 showed a serum sodium of 134, serum potassium of 3.8, serum creatinine is 0.64, serum magnesium of 1.92.  CBC showed a hemoglobin of 13.1.  Lipid panel done 2024 showed an LDL cholesterol of 69 and triglycerides of 78 atorvastatin 40 mg daily and Zetia 10 mg daily.  LFTs done in 2024 showed an ALT of 40 and AST of 42.  Ultrasound of the abdomen done in 2025 showed nonspecific mild coarsened appearance of the hepatic parenchyma possibly representing fatty infiltration.  Lower extremity ultrasound done 2025 showed no evidence of DVT.  ECG done today showed sinus rhythm with a heart  rate of 71 bpm.  The patient reports that he has been feeling generally well from the cardiac standpoint.  He denies any new chest pain, shortness of breath, palpitations and lightheadedness.  He reports an incident on 4/26/2025 where he was having his beard and head shaved and he starting shaking in the chair.  During this episode, he notes blacking out and that his eyes rolled back.  He denies feeling palpitations, shortness of breath, or chest pain during the episode.  He spent one night at [facility] and states he is not exactly sure what happened.  He continues to smoke cigarettes and reports he is down to half a pack a day.  He states that he takes all of his medications as prescribed. During my exam, he was resting comfortably on the exam table.            Assessment/Plan:   1. Coronary artery disease  Patient has a history of coronary artery disease as seen on prior cardiac catheterization.   ECG done today showed sinus rhythm with a heart rate of 71 bpm.    He denies anginal chest discomfort.  Blood pressure appears controlled on exam today.  Blood pressure appears controlled on exam today.  He should continue his current antihypertensive medications and antiplatelet therapy.  Echocardiogram done 11/30/2020 showed normal left ventricular size and systolic function with an ejection fraction of 65%, normal right ventricular size and systolic function, and no significant valve abnormalities.  Recent lab works as noted in the HPI.   Lipid panel done 5/14/2024 showed an LDL cholesterol of 69 and triglycerides of 78 atorvastatin 40 mg daily and Zetia 10 mg daily.   Lab work was ordered as noted below.   Please see lifestyle recommendations below.  Follow up in 3 months and sooner if necessary.      2. Hypertension  The patient has a history of hypertension which appears controlled on exam today.  He should continue his current antihypertensive medications and monitor his blood pressure at home.       3. Dyslipidemia   Lipid panel done 5/14/2024 showed an LDL cholesterol of 69 and triglycerides of 78 atorvastatin 40 mg daily and Zetia 10 mg daily.   Lab work was ordered as noted below.   Please see lifestyle recommendations below.     4. COPD/tobacco use  Patient is a current smoker.  I discussed with him the importance of smoking cessation as well as several strategies to assist him in quitting smoking.  I will refer him to the  smoking cessation program.     5. Near syncope  He reports an incident on 4/26/2025 where he was having his beard and head shaved and he starting shaking in the chair.  During this episode, he notes blacking out and that his eyes rolled back.  He denies feeling palpitations, shortness of breath, or chest pain during the episode.  I will order him an echocardiogram and Holter monitor.   Follow-up in 3 months and sooner if necessary.       Orders:   Lipid panel,   Holter monitor,  Echocardiogram,   Follow-up in 3 months.    Lifestyle Recommendations  I recommend a whole-food plant-based diet, an eating pattern that encourages the consumption of unrefined plant foods (such as fruits, vegetables, tubers, whole grains, legumes, nuts and seeds) and discourages meats, dairy products, eggs and processed foods.     The AHA/ACC recommends that the patient consume a dietary pattern that emphasizes intake of vegetables, fruits, and whole grains; includes low-fat dairy products, poultry, fish, legumes, non-tropical vegetable oils, and nuts; and limits intake of sodium, sweets, sugar-sweetened beverages, and red meats.  Adapt this dietary pattern to appropriate calorie requirements (a 500-750 kcal/day deficit to loose weight), personal and cultural food preferences, and nutrition therapy for other medical conditions (including diabetes).  Achieve this pattern by following plans such as the [place] Mediterranean, DASH dietary pattern, or AHA diet.     Engage in 2 hours and 30 minutes per week of  moderate-intensity physical activity, or 1 hour and 15 minutes (75 minutes) per week of vigorous-intensity aerobic physical activity, or an equivalent combination of moderate and vigorous-intensity aerobic physical activity. Aerobic activity should be performed in episodes of at least 10 minutes preferably spread throughout the week.     Adhering to a heart healthy diet, regular exercise habits, avoidance of tobacco products, and maintenance of a healthy weight are crucial components of their heart disease risk reduction.     Any positive review of systems not specifically addressed in the office visit today should be evaluated and treated by the patients primary care physician or in an emergency department if necessary     Patient was notified that results from ordered tests will be called to the patient if it changes current management; it will otherwise be discussed at a future appointment and available on  [facility] [place].     Thank you for allowing me to participate in the care of this patient.        This document was generated using the assistance of voice recognition software. If there are any errors of spelling, grammar, syntax, or meaning; please feel free to contact me directly for clarification.    Past Medical History:  He has a past medical history of Elbow disorder (09/18/2024), Long term (current) use of opiate analgesic (05/04/2022), Other bursal cyst, unspecified elbow (04/29/2022), and Personal history of other diseases of the circulatory system (12/09/2020).    Past Surgical History:  He has a past surgical history that includes Other surgical history (12/09/2020); Other surgical history (12/09/2020); Other surgical history (12/29/2022); Other surgical history (08/31/2021); MR angio head wo IV contrast (11/29/2020); and MR angio neck wo IV contrast (11/29/2020).      Social History:  He reports that he has been smoking cigarettes. He has been exposed to tobacco smoke. He has never used smokeless  "tobacco. He reports current alcohol use of about 60.0 standard drinks of alcohol per week. He reports that he does not use drugs.    Family History:  Family History  Problem Relation Name Age of Onset    Diabetes Mother      Lung cancer Mother      Coronary artery disease Father          Allergies:  Codeine    Outpatient Medications:  Current Outpatient Medications   Medication Instructions    albuterol 90 mcg/actuation inhaler 2 puffs, inhalation, Every 4 hours PRN    amLODIPine (NORVASC) 10 mg, oral, Daily    aspirin 81 mg, Daily    atorvastatin (LIPITOR) 40 mg, oral, Daily    carvedilol (COREG) 6.25 mg, oral, 2 times daily (morning and late afternoon)    ezetimibe (ZETIA) 10 mg, oral, Daily, Take at 9 AM    gabapentin (NEURONTIN) 900 mg, oral, Nightly    lisinopril 40 mg, oral, Daily    methocarbamol (ROBAXIN) 250 mg, oral, Daily PRN    miscellaneous medical supply (Blood Pressure Cuff) misc 1 Units, miscellaneous, Daily    sildenafil (VIAGRA) 100 mg, oral, Daily PRN    umeclidinium-vilanteroL (Anoro Ellipta) 62.5-25 mcg/actuation blister with device 1 puff, inhalation, Daily        ROS:  A 14 point review of systems was done and is negative other than as stated in HPI    Vitals:      10/8/2024     8:05 AM 1/10/2025     7:01 AM 2/5/2025     9:41 AM 2/19/2025     9:42 AM 3/17/2025     9:22 AM 3/18/2025     8:51 AM 4/2/2025     8:01 AM   Vitals   Systolic 125 118 118  122 132 123   Diastolic 80 75 79  82 82 79   BP Location  Left arm Left arm   Right arm Right arm   Heart Rate 60 71 65  56 56 68   Temp   36.1 °C (96.9 °F)  36.5 °C (97.7 °F) 36.5 °C (97.7 °F)    Resp 16  16  16 18 16   Height 1.651 m (5' 5\") 1.651 m (5' 5\") 1.651 m (5' 5\") 1.651 m (5' 5\") 1.651 m (5' 5\") 1.651 m (5' 5\") 1.651 m (5' 5\")   Weight (lb) 143 148 144 144 144 143 146.1   BMI 23.8 kg/m2 24.63 kg/m2 23.96 kg/m2 23.96 kg/m2 23.96 kg/m2 23.8 kg/m2 24.31 kg/m2   BSA (m2) 1.73 m2 1.75 m2 1.73 m2 1.73 m2 1.73 m2 1.73 m2 1.74 m2        Physical " Exam:   Constitutional: Cooperative, in no acute distress, alert, appears stated age.  Skin: Skin color, texture, turgor normal. No rashes or lesions.  Head: Normocephalic. No masses, lesions, tenderness or abnormalities  Eyes: Extraocular movements are grossly intact.  Mouth and throat: Mucous membranes moist  Neck: Neck supple, no carotid bruits, no JVD  Respiratory: Lungs clear to auscultation, no wheezing or rhonchi, no use of accessory muscles  Chest wall: No scars, normal excursion with respiration  Cardiovascular: Regular rhythm without murmur, gallop, or rubs  Gastrointestinal: Abdomen soft, nontender. Bowel sounds normal.  Musculoskeletal: Strength equal in upper extremities  Extremities: No pitting edema  Neurologic: Sensation grossly intact, alert and oriented x3         Intake/Output:   No intake/output data recorded.    Outpatient Medications  Current Outpatient Medications on File Prior to Visit   Medication Sig Dispense Refill    albuterol 90 mcg/actuation inhaler Inhale 2 puffs every 4 hours if needed for wheezing. 18 g 11    amLODIPine (Norvasc) 10 mg tablet Take 1 tablet (10 mg) by mouth once daily. 90 tablet 3    aspirin 81 mg chewable tablet Chew 1 tablet (81 mg) once daily. Chew and swallow.      atorvastatin (Lipitor) 40 mg tablet Take 1 tablet (40 mg) by mouth once daily. 90 tablet 1    carvedilol (Coreg) 6.25 mg tablet Take 1 tablet (6.25 mg) by mouth 2 times daily (morning and late afternoon). 180 tablet 1    ezetimibe (Zetia) 10 mg tablet TAKE ONE TABLET BY MOUTH DAILY AT 9AM 90 tablet 3    gabapentin (Neurontin) 300 mg capsule Take 3 capsules (900 mg) by mouth once daily at bedtime. 270 capsule 1    lisinopril 40 mg tablet TAKE ONE TABLET BY MOUTH DAILY 90 tablet 3    methocarbamol (Robaxin) 500 mg tablet Take 0.5 tablets (250 mg) by mouth once daily as needed for muscle spasms. 90 tablet 1    miscellaneous medical supply (Blood Pressure Cuff) misc 1 Units once daily. 1 each 0     sildenafil (Viagra) 100 mg tablet Take 1 tablet (100 mg) by mouth once daily as needed for erectile dysfunction. 10 tablet 3    umeclidinium-vilanteroL (Anoro Ellipta) 62.5-25 mcg/actuation blister with device Inhale 1 puff once daily. 3 each 3     No current facility-administered medications on file prior to visit.     Last Labs:  CBC -  Lab Results   Component Value Date    WBC 7.2 04/26/2025    HGB 13.1 (L) 04/26/2025    HCT 39.6 (L) 04/26/2025    MCV 95 04/26/2025     04/26/2025       CMP -  Lab Results   Component Value Date    CALCIUM 9.1 04/26/2025    PHOS 4.1 11/29/2020    PROT 7.6 04/26/2025    ALBUMIN 4.6 04/26/2025    AST 30 04/26/2025    ALT 21 04/26/2025    ALKPHOS 79 04/26/2025    BILITOT 0.4 04/26/2025       LIPID PANEL -   Lab Results   Component Value Date    CHOL 133 05/14/2024    TRIG 78 05/14/2024    HDL 48.3 05/14/2024    CHHDL 2.8 05/14/2024    LDLF 48 06/02/2023    VLDL 16 05/14/2024    NHDL 85 05/14/2024       RENAL FUNCTION PANEL -   Lab Results   Component Value Date    GLUCOSE 72 (L) 04/26/2025     (L) 04/26/2025    K 3.8 04/26/2025     04/26/2025    CO2 24 04/26/2025    ANIONGAP 14 04/26/2025    BUN 10 04/26/2025    CREATININE 0.64 04/26/2025    GFRMALE >90 06/02/2023    CALCIUM 9.1 04/26/2025    PHOS 4.1 11/29/2020    ALBUMIN 4.6 04/26/2025        Lab Results   Component Value Date    BNP 76 04/26/2025    HGBA1C 5.7 11/29/2020       ECG  No results found for this or any previous visit (from the past 4464 hours).    Echocardiogram  No results found for this or any previous visit from the past 1095 days.      CV Studies:  EKG: No results found for this or any previous visit (from the past 4464 hours).  Echocardiogram:   Echocardiogram     [address] [address]  Phone [phone] Fax [phone]    TRANSTHORACIC ECHOCARDIOGRAM REPORT      Patient Name:     COLLIN Morin Physician:   [id number] Hany  Arun WU  Study Date:       11/30/2020          Referring Physician: George Hammond  MRN/PID:          [id number]            PCP:  Accession/Order#: [id number]           Department Location: [facility]  YOB: 1960            Fellow:  Gender:           M                   Nurse:               Monserrat Llanos RN  Admit Date:       11/28/2020          Sonographer:         Siomara Munoz [facility]  Admission Status: Inpatient - Routine Additional Staff:  Height:           165.10 cm           CC Report to:  Weight:           53.52 kg            Study Type:          Echocardiogram  BSA:              1.58 m2  Blood Pressure: 167 /86 mmHg    Diagnosis/ICD: G45.8-Other transient cerebral ischemic attacks and related  syndromes  Indication:    Stroke  Procedure/CPT: Echo Complete w Full [id number]    Patient History:  Pertinent History: Stent (2007).    Study Detail: The following Echo studies were performed: 2D, M-Mode, Doppler and  color flow. Agitated saline used as a contrast agent for  intraseptal flow evaluation.      PHYSICIAN INTERPRETATION:  Left Ventricle: The left ventricular systolic function is normal. The calculated ejection fraction is normal at 65 % using the Esparza's Bi-plane MOD calculation. There are no regional wall motion abnormalities. The left ventricular cavity size is normal. The left ventricular septal wall thickness is normal. There is normal left ventricular posterior wall thickness. Spectral Doppler shows an impaired relaxation pattern of left ventricular diastolic filling.  Left Atrium: The left atrium is normal in size. A bubble study using agitated saline was performed. Bubble study is negative.  Right Ventricle: The right ventricle is normal in size. There is normal right ventricular global systolic function.  Right Atrium: The right atrium is normal in size.  Aortic Valve: The aortic valve is trileaflet. There is no evidence of aortic valve stenosis.  There is no evidence of  aortic valve regurgitation. The peak instantaneous gradient of the aortic valve is 11.6 mmHg. The mean gradient of the aortic valve is 6.0 mmHg.  Mitral Valve: The mitral valve is normal in structure. There is no evidence of mitral valve stenosis. There is trace mitral valve regurgitation.  Tricuspid Valve: The tricuspid valve is structurally normal. There is trace tricuspid regurgitation. The Doppler estimated RVSP is within normal limits at 27.6 mmHg.  Pulmonic Valve: The pulmonic valve is structurally normal. There is physiologic pulmonic valve regurgitation.  Pericardium: There is no pericardial effusion noted.  Aorta: The aortic root is normal.  Pulmonary Artery: The pulmonary artery is normal in size.  Pulmonary Veins: There is no evidence of pulmonary vein flow reversal.  Systemic Veins: The inferior vena cava appears to be of normal size. There is IVC inspiratory collapse greater than 50%.      CONCLUSIONS:  1. The left ventricular systolic function is normal.  2. Spectral Doppler shows an impaired relaxation pattern of left ventricular diastolic filling.  3. RVSP within normal limits.  4. Aortic valve stenosis is not present.    QUANTITATIVE DATA SUMMARY:  2D MEASUREMENTS:  Normal Ranges:  Ao Root d:     3.30 cm   (2.0-3.7cm)  LAs:           2.80 cm   (2.7-4.0cm)  IVSd:          0.81 cm   (0.6-1.1cm)  LVPWd:         0.81 cm   (0.6-1.1cm)  LVIDd:         4.83 cm   (3.9-5.9cm)  LVIDs:         3.36 cm  LV Mass Index: 82.4 g/m2  LV % FS        30.4 %    LA VOLUME:  Normal Ranges:  LA Vol A4C:        52.6 ml    (22+/-6mL/m2)  LA Vol A2C:        39.0 ml  LA Vol BP:         47.8 ml  LA Vol Index A4C:  33.3ml/m2  LA Vol Index A2C:  24.7 ml/m2  LA Vol Index BP:   30.2 ml/m2  LA Area A4C:       16.5 cm2  LA Area A2C:       15.0 cm2  LA Major Axis A4C: 4.4 cm  LA Major Axis A2C: 4.9 cm  LA Volume Index:   29.0 ml/m2    RA VOLUME BY A/L METHOD:  Normal Ranges:  RA Area A4C: 13.3 cm2    AORTA MEASUREMENTS:  Normal  Ranges:  Ao Sinus, d: 3.30 cm (2.1-3.5cm)  Ao STJ, d:   2.60 cm (1.7-3.4cm)    LV SYSTOLIC FUNCTION BY 2D PLANIMETRY (MOD):  Normal Ranges:  EF-A4C View: 66.9 % (>55%)  EF-A2C View: 64.2 %  EF-Biplane:  65.4 %    LV DIASTOLIC FUNCTION:  Normal Ranges:  MV Peak E:        0.73 m/s    (0.7-1.2 m/s)  MV Peak A:        0.68 m/s    (0.42-0.7 m/s)  E/A Ratio:        1.07        (1.0-2.2)  MV e'             0.08 m/s    (>8.0)  MV lateral e'     0.10 m/s  MV medial e'      0.07 m/s  MV A Dur:         113.00 msec  E/e' Ratio:       8.56        (<8.0)  PulmV A Revs Dur: 155.00 msec    AORTIC VALVE:  Normal Ranges:  AoV Vmax:                1.70 m/s  (<1.7m/s)  AoV Peak P.6 mmHg (<20mmHg)  AoV Mean P.0 mmHg  (1.7-11.5mmHg)  LVOT Max Eloy:            1.07 m/s  (<1.1m/s)  AoV VTI:                 38.00 cm  (18-25cm)  LVOT VTI:                22.50 cm  LVOT Diameter:           1.70 cm   (1.8-2.4cm)  AoV Area, VTI:           1.34 cm2  (2.5-5.5cm2)  AoV Area,Vmax:           1.43 cm2  (2.5-4.5cm2)  AoV Dimensionless Index: 0.59    RIGHT VENTRICLE:  RV 1   2.7 cm  RV 2   2.0 cm  RV 3   5.6 cm  TAPSE: 25.6 mm  RV s'  0.14 m/s    TRICUSPID VALVE/RVSP:  Normal Ranges:  Peak TR Velocity: 2.48 m/s  RV Syst Pressure: 27.6 mmHg (< 30mmHg)  TV E Vmax:        0.34 m/s  (0.3-0.7m/s)  TV A Vmax:        0.33 m/s  IVC Diam:         1.20 cm    Pulmonary Veins:  PulmV A Revs Dur: 155.00 msec      [id number] Hany Dias MD  Electronically signed on 2020 at 3:32:50 PM        Final     Stress Testing IMGRESULT([id number]:1:1825):   NM CARDIAC STRESS REST (MYOCARDIAL PERFUSION MIBI) 2022    Narrative  MRN: [id number]  Patient Name: COLLIN FREY    STUDY:  CARDIAC STRESS/REST INJECTION;  3/21/2022 9:16 am    INDICATION:  SOBOE  I25.10: Athscl heart disease of native coronary artery w/o ang  pctrs J44.9: Chronic obstructive pulmonary disease, unspecified COPD  type I10: Essential  hypertension.    COMPARISON:  None.    ACCESSION NUMBER(S):  [id number]    ORDERING CLINICIAN:  MAYKEL BOWEN    TECHNIQUE:  DIVISION OF NUCLEAR MEDICINE  PHARMACOLOGIC STRESS MYOCARDIAL PERFUSION SCAN, ONE DAY PROTOCOL    The patient received an intravenous dose of  11.2 mCi of Tc-99m  tetrofosmin and resting emission tomographic (SPECT) images of the  myocardium were acquired. The patient then received an intravenous  infusion of 0.4mg regadenoson (Lexiscan) followed by an additional  dose of  33.4 mCi of Tc-99m tetrofosmin. Stress phase SPECT images of  the myocardium were then acquired. These included ECG-gated images to  assess and quantify ventricular function.    FINDINGS:  Stress and rest images both demonstrate a normal distribution of  perfusion throughout all LV segments with no sign of ischemia.    ECG-gated images demonstrate normal LV size and myocardial  contractility with an LV ejection fraction of   67 % (normal above 50  percent). Summed stress score is 0 summed rest score 0 summed  difference score 0.    Impression  1. Normal stress myocardial perfusion imaging in response to  pharmacologic stress.  2. Well-maintained left ventricular function.    Cardiac Catheterization: No results found for this or any previous visit from the past 1825 days.  No results found for this or any previous visit from the past 3650 days.     Cardiac Scoring: No results found for this or any previous visit from the past 1825 days.    AAA : No results found for this or any previous visit from the past 1825 days.    OTHER: No results found for this or any previous visit from the past 1825 days.    LAST IMAGING RESULTS  US abdomen limited liver  Narrative: Interpreted By:  Joo Pagan,   STUDY:  US ABDOMEN LIMITED LIVER;  4/1/2025 9:13 am      INDICATION:  Signs/Symptoms:elevated liver enzymes, hx EtOH dependence.      ,F10.20 Alcohol dependence, uncomplicated (Multi)      COMPARISON:  None.      ACCESSION  NUMBER(S):  [id number]      ORDERING CLINICIAN:  LIBRA HALEY      TECHNIQUE:  Real-time sonographic evaluation of the right upper quadrant was  performed.      FINDINGS:  LIVER:  There is nonspecific mild coarsened hyperechoic appearance of the  hepatic parenchyma. No focal hepatic lesion is demonstrated. Liver  measures 13 cm in sagittal dimension.      GALLBLADDER:  The gallbladder is nondistended and demonstrates no evidence of  gallstones, wall thickening or surrounding fluid. The gallbladder  wall measures 2 mm. Sonographic Patel's sign is negative.      BILIARY TREE:  Common bile duct is not dilated measuring 4 mm in diameter.      PANCREAS:  The visualized pancreas is unremarkable in appearance. Pancreatic  tail is obscured by bowel gas.      RIGHT KIDNEY:  Right kidney measures  10.5cm in length.  No right hydronephrosis is  seen.      Impression: Nonspecific mild coarsened appearance of the hepatic parenchyma  possibly representing fatty infiltration. No focal hepatic lesion  demonstrated.      MACRO:  None.      Signed by: Joo Pagan 4/1/2025 4:26 PM  Dictation workstation:   [id number]      Problem List Items Addressed This Visit       Atherosclerotic heart disease of native coronary artery without angina pectoris - Primary    Chronic obstructive pulmonary disease, unspecified    Overview   2 PPD smoker x 50 years           Essential hypertension    Overview   Home blood pressure cuff checked against manual cuff in office on 9/18, running 10-12 points higher systolically than manual reading         Hyperlipidemia    Tobacco use          Cassius Jeffers DO, FACC, FACOI

## 2025-04-26 ENCOUNTER — APPOINTMENT (OUTPATIENT)
Dept: RADIOLOGY | Facility: HOSPITAL | Age: 65
End: 2025-04-26
Payer: COMMERCIAL

## 2025-04-26 ENCOUNTER — HOSPITAL ENCOUNTER (EMERGENCY)
Facility: HOSPITAL | Age: 65
Discharge: HOME | End: 2025-04-27
Attending: EMERGENCY MEDICINE
Payer: COMMERCIAL

## 2025-04-26 ENCOUNTER — APPOINTMENT (OUTPATIENT)
Dept: CARDIOLOGY | Facility: HOSPITAL | Age: 65
End: 2025-04-26
Payer: COMMERCIAL

## 2025-04-26 DIAGNOSIS — R93.7 ABNORMAL CT SCAN, LUMBAR SPINE: ICD-10-CM

## 2025-04-26 DIAGNOSIS — R20.0 LOWER EXTREMITY NUMBNESS: ICD-10-CM

## 2025-04-26 DIAGNOSIS — M79.605 PAIN OF LEFT LOWER EXTREMITY: Primary | ICD-10-CM

## 2025-04-26 LAB
ALBUMIN SERPL BCP-MCNC: 4.6 G/DL (ref 3.4–5)
ALP SERPL-CCNC: 79 U/L (ref 33–136)
ALT SERPL W P-5'-P-CCNC: 21 U/L (ref 10–52)
ANION GAP SERPL CALC-SCNC: 14 MMOL/L (ref 10–20)
APPEARANCE UR: CLEAR
AST SERPL W P-5'-P-CCNC: 30 U/L (ref 9–39)
BASOPHILS # BLD AUTO: 0.05 X10*3/UL (ref 0–0.1)
BASOPHILS NFR BLD AUTO: 0.7 %
BILIRUB SERPL-MCNC: 0.4 MG/DL (ref 0–1.2)
BILIRUB UR STRIP.AUTO-MCNC: NEGATIVE MG/DL
BNP SERPL-MCNC: 76 PG/ML (ref 0–99)
BUN SERPL-MCNC: 10 MG/DL (ref 6–23)
CALCIUM SERPL-MCNC: 9.1 MG/DL (ref 8.6–10.3)
CARDIAC TROPONIN I PNL SERPL HS: 7 NG/L (ref 0–20)
CHLORIDE SERPL-SCNC: 100 MMOL/L (ref 98–107)
CO2 SERPL-SCNC: 24 MMOL/L (ref 21–32)
COLOR UR: COLORLESS
CREAT SERPL-MCNC: 0.64 MG/DL (ref 0.5–1.3)
CRP SERPL-MCNC: 1.97 MG/DL
EGFRCR SERPLBLD CKD-EPI 2021: >90 ML/MIN/1.73M*2
EOSINOPHIL # BLD AUTO: 0.27 X10*3/UL (ref 0–0.7)
EOSINOPHIL NFR BLD AUTO: 3.8 %
ERYTHROCYTE [DISTWIDTH] IN BLOOD BY AUTOMATED COUNT: 13.2 % (ref 11.5–14.5)
ERYTHROCYTE [SEDIMENTATION RATE] IN BLOOD BY WESTERGREN METHOD: 26 MM/H (ref 0–20)
GLUCOSE SERPL-MCNC: 72 MG/DL (ref 74–99)
GLUCOSE UR STRIP.AUTO-MCNC: NORMAL MG/DL
HCT VFR BLD AUTO: 39.6 % (ref 41–52)
HGB BLD-MCNC: 13.1 G/DL (ref 13.5–17.5)
IMM GRANULOCYTES # BLD AUTO: 0.02 X10*3/UL (ref 0–0.7)
IMM GRANULOCYTES NFR BLD AUTO: 0.3 % (ref 0–0.9)
KETONES UR STRIP.AUTO-MCNC: NEGATIVE MG/DL
LACTATE SERPL-SCNC: 1.8 MMOL/L (ref 0.4–2)
LEUKOCYTE ESTERASE UR QL STRIP.AUTO: NEGATIVE
LYMPHOCYTES # BLD AUTO: 2 X10*3/UL (ref 1.2–4.8)
LYMPHOCYTES NFR BLD AUTO: 27.8 %
MAGNESIUM SERPL-MCNC: 1.92 MG/DL (ref 1.6–2.4)
MCH RBC QN AUTO: 31.3 PG (ref 26–34)
MCHC RBC AUTO-ENTMCNC: 33.1 G/DL (ref 32–36)
MCV RBC AUTO: 95 FL (ref 80–100)
MONOCYTES # BLD AUTO: 1.06 X10*3/UL (ref 0.1–1)
MONOCYTES NFR BLD AUTO: 14.7 %
NEUTROPHILS # BLD AUTO: 3.79 X10*3/UL (ref 1.2–7.7)
NEUTROPHILS NFR BLD AUTO: 52.7 %
NITRITE UR QL STRIP.AUTO: NEGATIVE
NRBC BLD-RTO: 0 /100 WBCS (ref 0–0)
PH UR STRIP.AUTO: 5.5 [PH]
PLATELET # BLD AUTO: 198 X10*3/UL (ref 150–450)
POTASSIUM SERPL-SCNC: 3.8 MMOL/L (ref 3.5–5.3)
PROT SERPL-MCNC: 7.6 G/DL (ref 6.4–8.2)
PROT UR STRIP.AUTO-MCNC: NEGATIVE MG/DL
RBC # BLD AUTO: 4.19 X10*6/UL (ref 4.5–5.9)
RBC # UR STRIP.AUTO: NEGATIVE MG/DL
SODIUM SERPL-SCNC: 134 MMOL/L (ref 136–145)
SP GR UR STRIP.AUTO: 1
UROBILINOGEN UR STRIP.AUTO-MCNC: NORMAL MG/DL
WBC # BLD AUTO: 7.2 X10*3/UL (ref 4.4–11.3)

## 2025-04-26 PROCEDURE — 72131 CT LUMBAR SPINE W/O DYE: CPT

## 2025-04-26 PROCEDURE — 85025 COMPLETE CBC W/AUTO DIFF WBC: CPT | Performed by: EMERGENCY MEDICINE

## 2025-04-26 PROCEDURE — 83880 ASSAY OF NATRIURETIC PEPTIDE: CPT | Performed by: EMERGENCY MEDICINE

## 2025-04-26 PROCEDURE — 81003 URINALYSIS AUTO W/O SCOPE: CPT | Performed by: EMERGENCY MEDICINE

## 2025-04-26 PROCEDURE — 99285 EMERGENCY DEPT VISIT HI MDM: CPT | Mod: 25 | Performed by: EMERGENCY MEDICINE

## 2025-04-26 PROCEDURE — 85652 RBC SED RATE AUTOMATED: CPT | Performed by: EMERGENCY MEDICINE

## 2025-04-26 PROCEDURE — 86140 C-REACTIVE PROTEIN: CPT | Performed by: EMERGENCY MEDICINE

## 2025-04-26 PROCEDURE — 80053 COMPREHEN METABOLIC PANEL: CPT | Performed by: EMERGENCY MEDICINE

## 2025-04-26 PROCEDURE — 72131 CT LUMBAR SPINE W/O DYE: CPT | Performed by: SURGERY

## 2025-04-26 PROCEDURE — 84484 ASSAY OF TROPONIN QUANT: CPT | Performed by: EMERGENCY MEDICINE

## 2025-04-26 PROCEDURE — 83605 ASSAY OF LACTIC ACID: CPT | Performed by: EMERGENCY MEDICINE

## 2025-04-26 PROCEDURE — 36415 COLL VENOUS BLD VENIPUNCTURE: CPT | Performed by: EMERGENCY MEDICINE

## 2025-04-26 PROCEDURE — 83735 ASSAY OF MAGNESIUM: CPT | Performed by: EMERGENCY MEDICINE

## 2025-04-26 PROCEDURE — 70450 CT HEAD/BRAIN W/O DYE: CPT

## 2025-04-26 PROCEDURE — 93005 ELECTROCARDIOGRAM TRACING: CPT

## 2025-04-26 PROCEDURE — 70450 CT HEAD/BRAIN W/O DYE: CPT | Performed by: SURGERY

## 2025-04-26 RX ORDER — LANOLIN ALCOHOL/MO/W.PET/CERES
100 CREAM (GRAM) TOPICAL DAILY
Status: DISCONTINUED | OUTPATIENT
Start: 2025-04-29 | End: 2025-04-27 | Stop reason: HOSPADM

## 2025-04-26 RX ORDER — MULTIVIT-MIN/IRON FUM/FOLIC AC 7.5 MG-4
1 TABLET ORAL DAILY
Status: DISCONTINUED | OUTPATIENT
Start: 2025-04-27 | End: 2025-04-27 | Stop reason: HOSPADM

## 2025-04-26 RX ORDER — THIAMINE HYDROCHLORIDE 100 MG/ML
100 INJECTION, SOLUTION INTRAMUSCULAR; INTRAVENOUS DAILY
Status: DISCONTINUED | OUTPATIENT
Start: 2025-04-27 | End: 2025-04-27 | Stop reason: HOSPADM

## 2025-04-26 RX ORDER — FOLIC ACID 1 MG/1
1 TABLET ORAL DAILY
Status: DISCONTINUED | OUTPATIENT
Start: 2025-04-27 | End: 2025-04-27 | Stop reason: HOSPADM

## 2025-04-26 RX ORDER — DIAZEPAM 5 MG/ML
10 INJECTION, SOLUTION INTRAMUSCULAR; INTRAVENOUS EVERY 2 HOUR PRN
Status: DISCONTINUED | OUTPATIENT
Start: 2025-04-26 | End: 2025-04-27 | Stop reason: HOSPADM

## 2025-04-26 ASSESSMENT — LIFESTYLE VARIABLES
EVER HAD A DRINK FIRST THING IN THE MORNING TO STEADY YOUR NERVES TO GET RID OF A HANGOVER: YES
HAVE PEOPLE ANNOYED YOU BY CRITICIZING YOUR DRINKING: YES
HAVE YOU EVER FELT YOU SHOULD CUT DOWN ON YOUR DRINKING: YES
EVER FELT BAD OR GUILTY ABOUT YOUR DRINKING: YES
TOTAL SCORE: 4

## 2025-04-26 ASSESSMENT — PAIN - FUNCTIONAL ASSESSMENT: PAIN_FUNCTIONAL_ASSESSMENT: 0-10

## 2025-04-26 ASSESSMENT — PAIN DESCRIPTION - PROGRESSION: CLINICAL_PROGRESSION: NOT CHANGED

## 2025-04-26 ASSESSMENT — PAIN SCALES - GENERAL: PAINLEVEL_OUTOF10: 0 - NO PAIN

## 2025-04-27 VITALS
HEIGHT: 65 IN | TEMPERATURE: 97.5 F | OXYGEN SATURATION: 97 % | SYSTOLIC BLOOD PRESSURE: 98 MMHG | BODY MASS INDEX: 24.32 KG/M2 | HEART RATE: 73 BPM | RESPIRATION RATE: 16 BRPM | DIASTOLIC BLOOD PRESSURE: 72 MMHG | WEIGHT: 146 LBS

## 2025-04-27 LAB — HOLD SPECIMEN: NORMAL

## 2025-04-27 RX ORDER — METHYLPREDNISOLONE 4 MG/1
TABLET ORAL
Qty: 21 TABLET | Refills: 0 | Status: SHIPPED | OUTPATIENT
Start: 2025-04-27 | End: 2025-05-03

## 2025-04-27 NOTE — ED PROVIDER NOTES
History/Exam limitations: none.   Additional history was obtained from patient.    HPI:       Isael Haji is a 64 y.o. with a history of prior stroke resulting in left-sided deficits, CAD, alcohol abuse.  Patient states that for the last 2 days he has had difficulty walking due to numbness and weakness in his left leg.  He also has numbness in his right leg as well.  He does have prior stroke resulting in left-sided deficits however the numbness and weakness in his left leg is much more severe than usual.  Additionally when he lays down he gets numbness from his waist down bilaterally.  He feels that his right leg is slightly weak, but not nearly as weak as his left.  He does have some weakness and altered sensation in his left arm that is at baseline and not different from baseline.  Right arm does not have any symptoms.  He has been more short of breath recently however he attributes this to his smoking.  He has had night sweats but denies any known fever.  No change in his cough.  The patient is a daily alcohol drinker.  He denies any recent change in the amount of alcohol he is drinking.     Last Known Well Time: 1100 4/25/25        ------------------------------------------------------------------------------------------------------------------------------------------     Physical Exam:    ED Triage Vitals [04/26/25 2050]   Temperature Heart Rate Respirations BP   36.4 °C (97.5 °F) 71 16 (!) 143/125      Pulse Ox Temp src Heart Rate Source Patient Position   98 % -- -- --      BP Location FiO2 (%)     -- --          Gen: Not in acute distress  Head/Neck: NCAT  Eyes: Anicteric sclerae, noninjected conjunctivae  Nose: No rhinorrhea  Mouth:  MMM  Heart: Regular rate and rhythm w/ no murmurs, rubs, gallops  Lungs: CTAB w/o wheezes, rales, rhonchi, no increased work of breathing  Abdomen: Soft, NT/ND  Musculoskeletal: No deformities.  Midline lumbar spine tenderness.  Extremities: No edema.  Neurologic: Please  see below for NIHSS  Skin: No rashes noted  Psychological: Calm        Interval: Baseline  Time: 9:29 PM  Person Administering Scale: Danisha Frederick DO     1a  Level of consciousness: 0=alert; keenly responsive   1b. LOC questions:  0=Performs both tasks correctly   1c. LOC commands: 0=Performs both tasks correctly   2.  Best Gaze: 0=normal   3. Visual: 0=No visual loss   4. Facial Palsy: 0=Normal symmetric movement   5a. Motor left arm: 1=Drift, limb holds 90 (or 45) degrees but drifts down before full 10 seconds: does not hit bed   5b.  Motor right arm: 0=No drift, limb holds 90 (or 45) degrees for full 10 seconds   6a. motor left le=Some effort against gravity, limb cannot get to or maintain (if cured) 90 (or 45) degrees, drifts down to bed, but has some effort against gravity   6b  Motor right le=Drift, limb holds 90 (or 45) degrees but drifts down before full 10 seconds: does not hit bed   7. Limb Ataxia: 0=Absent   8.  Sensory: 1=Mild to moderate sensory loss; patient feels pinprick is less sharp or is dull on the affected side; there is a loss of superficial pain with pinprick but patient is aware He is being touched   9. Best Language:  0=No aphasia, normal   10. Dysarthria: 0=Normal   11. Extinction and Inattention: 0=No abnormality     Total:   5         VAN: VAN: Negative     ------------------------------------------------------------------------------------------------------------------------------------------     Medical Decision Making: Patient with baseline left-sided deficits due to prior stroke with new worsening numbness and weakness of the left leg as well as new right lower extremity deficits.  The right-sided deficits wax and wane and are worse with certain positions.  Given the change in bilateral lower extremity symptoms I am suspicious for lumbar spine pathology contributing to his new symptoms.  Will obtain CT head as well given that he is a daily drinker.  He does have equal pulses in  all extremities and no pain in his torso making aortic pathology much less likely.          EKG interpreted by myself: ***     Independent Interpretation of Studies: I independently interpreted the CT head and see {Stroke CT Independent Interpretation:51441}    Chronic Medical Conditions Significantly Affecting Care: ***    Social Determinants of Health Significantly Affecting Care: {Social determinants:23891}     External Records Reviewed: I reviewed recent and relevant outside records including: ***     Discussion of Management with Other Providers:   I discussed the patient/results with: *** neurology and the admitting team      IV Thrombolysis IV Thrombolysis Checklist    {Pediatric patients aged <18 years with Acute Suspected Stroke - refer to the [facility] Clinical Practice Guideline for the Emergency Management of pediatric patients with Acute Suspected Stroke & the Pediatric IV Thrombolysis Consent :99}    {IV Thrombolysis Given:99001}  {For EVT therapy, type .EVT :99}      Procedure  Procedures        patient does seem mildly intoxicated and while I do believe he is capable of making his own medical decisions at his initial presentation I am concerned that this is interfering with his decision-making process.  He is agreeable to monitoring in the emergency department.  Despite this he continues to decline transfer or further spine evaluation.  He does seem to understand the risk and benefits of this.  He is able to repeat back my concerns and continues to decline this because he is concerned that any intervention might make his symptoms worse.  He was started on steroid burst.  He is encouraged to follow-up with a spine doctor as soon as possible.  He is also encouraged to return to the emergency department for any new or worsening symptoms.    Procedure  Procedures          Danisha Frederick DO  05/06/25 1949

## 2025-04-28 LAB
ATRIAL RATE: 197 BPM
P AXIS: 67 DEGREES
PR INTERVAL: 148 MS
Q ONSET: 253 MS
QRS COUNT: 10 BEATS
QRS DURATION: 99 MS
QT INTERVAL: 405 MS
QTC CALCULATION(BAZETT): 422 MS
QTC FREDERICIA: 415 MS
R AXIS: 51 DEGREES
T AXIS: 121 DEGREES
T OFFSET: 456 MS
VENTRICULAR RATE: 65 BPM

## 2025-05-09 ENCOUNTER — ANCILLARY PROCEDURE (OUTPATIENT)
Dept: CARDIOLOGY | Facility: HOSPITAL | Age: 65
End: 2025-05-09
Payer: COMMERCIAL

## 2025-05-09 ENCOUNTER — APPOINTMENT (OUTPATIENT)
Dept: CARDIOLOGY | Facility: CLINIC | Age: 65
End: 2025-05-09
Payer: COMMERCIAL

## 2025-05-09 VITALS
SYSTOLIC BLOOD PRESSURE: 122 MMHG | HEIGHT: 65 IN | WEIGHT: 146 LBS | BODY MASS INDEX: 24.32 KG/M2 | HEART RATE: 71 BPM | DIASTOLIC BLOOD PRESSURE: 78 MMHG

## 2025-05-09 DIAGNOSIS — J44.9 CHRONIC OBSTRUCTIVE PULMONARY DISEASE, UNSPECIFIED COPD TYPE (MULTI): ICD-10-CM

## 2025-05-09 DIAGNOSIS — R55 NEAR SYNCOPE: ICD-10-CM

## 2025-05-09 DIAGNOSIS — I10 ESSENTIAL HYPERTENSION: ICD-10-CM

## 2025-05-09 DIAGNOSIS — Z72.0 TOBACCO USE: ICD-10-CM

## 2025-05-09 DIAGNOSIS — I25.10 ATHEROSCLEROSIS OF NATIVE CORONARY ARTERY OF NATIVE HEART WITHOUT ANGINA PECTORIS: ICD-10-CM

## 2025-05-09 DIAGNOSIS — E78.00 PURE HYPERCHOLESTEROLEMIA: ICD-10-CM

## 2025-05-09 DIAGNOSIS — E78.2 MIXED HYPERLIPIDEMIA: ICD-10-CM

## 2025-05-09 DIAGNOSIS — I25.10 ATHEROSCLEROSIS OF NATIVE CORONARY ARTERY OF NATIVE HEART WITHOUT ANGINA PECTORIS: Primary | ICD-10-CM

## 2025-05-09 LAB
ATRIAL RATE: 71 BPM
BODY SURFACE AREA: 1.74 M2
P AXIS: 68 DEGREES
P OFFSET: 215 MS
P ONSET: 165 MS
PR INTERVAL: 116 MS
Q ONSET: 223 MS
QRS COUNT: 12 BEATS
QRS DURATION: 82 MS
QT INTERVAL: 376 MS
QTC CALCULATION(BAZETT): 408 MS
QTC FREDERICIA: 397 MS
R AXIS: 74 DEGREES
T AXIS: 50 DEGREES
T OFFSET: 411 MS
VENTRICULAR RATE: 71 BPM

## 2025-05-09 PROCEDURE — 3074F SYST BP LT 130 MM HG: CPT | Performed by: INTERNAL MEDICINE

## 2025-05-09 PROCEDURE — 3078F DIAST BP <80 MM HG: CPT | Performed by: INTERNAL MEDICINE

## 2025-05-09 PROCEDURE — 3008F BODY MASS INDEX DOCD: CPT | Performed by: INTERNAL MEDICINE

## 2025-05-09 PROCEDURE — 99214 OFFICE O/P EST MOD 30 MIN: CPT | Mod: 25 | Performed by: INTERNAL MEDICINE

## 2025-05-09 PROCEDURE — 93246 EXT ECG>7D<15D RECORDING: CPT

## 2025-05-09 PROCEDURE — 93005 ELECTROCARDIOGRAM TRACING: CPT | Performed by: INTERNAL MEDICINE

## 2025-05-09 PROCEDURE — 93247 EXT ECG>7D<15D SCAN A/R: CPT

## 2025-05-09 PROCEDURE — 99214 OFFICE O/P EST MOD 30 MIN: CPT | Performed by: INTERNAL MEDICINE

## 2025-05-09 NOTE — PROGRESS NOTES
Deloris RAY placed holter monitor on pt on 05/09/25     Pt was explained on what they can and cannot do while wearing the monitor for 14 days.    Pt verbalized understanding.

## 2025-05-10 LAB
ALBUMIN SERPL-MCNC: 4.6 G/DL (ref 3.6–5.1)
ALBUMIN/GLOB SERPL: 1.7 (CALC) (ref 1–2.5)
ALP SERPL-CCNC: 77 U/L (ref 35–144)
ALT SERPL-CCNC: 30 U/L (ref 9–46)
ANION GAP SERPL CALCULATED.4IONS-SCNC: 8 MMOL/L (CALC) (ref 7–17)
AST SERPL-CCNC: 35 U/L (ref 10–35)
BILIRUB DIRECT SERPL-MCNC: 0.1 MG/DL
BILIRUB INDIRECT SERPL-MCNC: 0.3 MG/DL (CALC) (ref 0.2–1.2)
BILIRUB SERPL-MCNC: 0.4 MG/DL (ref 0.2–1.2)
BUN SERPL-MCNC: 12 MG/DL (ref 7–25)
BUN/CREAT SERPL: NORMAL (CALC) (ref 6–22)
CALCIUM SERPL-MCNC: 9.5 MG/DL (ref 8.6–10.3)
CHLORIDE SERPL-SCNC: 102 MMOL/L (ref 98–110)
CHOLEST SERPL-MCNC: 137 MG/DL
CHOLEST/HDLC SERPL: 2.9 (CALC)
CO2 SERPL-SCNC: 26 MMOL/L (ref 20–32)
CREAT SERPL-MCNC: 0.72 MG/DL (ref 0.7–1.35)
EGFRCR SERPLBLD CKD-EPI 2021: 103 ML/MIN/1.73M2
ERYTHROCYTE [DISTWIDTH] IN BLOOD BY AUTOMATED COUNT: 12.7 % (ref 11–15)
FOLATE SERPL-MCNC: 16.1 NG/ML
GLOBULIN SER CALC-MCNC: 2.7 G/DL (CALC) (ref 1.9–3.7)
GLUCOSE SERPL-MCNC: 86 MG/DL (ref 65–99)
HCT VFR BLD AUTO: 39.6 % (ref 38.5–50)
HDLC SERPL-MCNC: 47 MG/DL
HGB BLD-MCNC: 14 G/DL (ref 13.2–17.1)
LDLC SERPL CALC-MCNC: 77 MG/DL (CALC)
MAGNESIUM SERPL-MCNC: 1.9 MG/DL (ref 1.5–2.5)
MCH RBC QN AUTO: 33.9 PG (ref 27–33)
MCHC RBC AUTO-ENTMCNC: 35.4 G/DL (ref 32–36)
MCV RBC AUTO: 95.9 FL (ref 80–100)
NONHDLC SERPL-MCNC: 90 MG/DL (CALC)
PLATELET # BLD AUTO: 243 THOUSAND/UL (ref 140–400)
PMV BLD REES-ECKER: 10 FL (ref 7.5–12.5)
POTASSIUM SERPL-SCNC: 4.8 MMOL/L (ref 3.5–5.3)
PROT SERPL-MCNC: 7.3 G/DL (ref 6.1–8.1)
RBC # BLD AUTO: 4.13 MILLION/UL (ref 4.2–5.8)
SODIUM SERPL-SCNC: 136 MMOL/L (ref 135–146)
TRIGL SERPL-MCNC: 54 MG/DL
TSH SERPL-ACNC: 2.4 MIU/L (ref 0.4–4.5)
VIT B1 BLD-SCNC: NORMAL NMOL/L
VIT B12 SERPL-MCNC: 502 PG/ML (ref 200–1100)
WBC # BLD AUTO: 6.5 THOUSAND/UL (ref 3.8–10.8)

## 2025-05-12 ENCOUNTER — APPOINTMENT (OUTPATIENT)
Dept: PRIMARY CARE | Facility: CLINIC | Age: 65
End: 2025-05-12
Payer: COMMERCIAL

## 2025-05-12 VITALS
TEMPERATURE: 97.5 F | HEART RATE: 60 BPM | SYSTOLIC BLOOD PRESSURE: 120 MMHG | OXYGEN SATURATION: 97 % | DIASTOLIC BLOOD PRESSURE: 77 MMHG | HEIGHT: 65 IN | BODY MASS INDEX: 23.99 KG/M2 | WEIGHT: 144 LBS | RESPIRATION RATE: 15 BRPM

## 2025-05-12 DIAGNOSIS — M47.816 LUMBAR SPONDYLOSIS: ICD-10-CM

## 2025-05-12 DIAGNOSIS — R29.898 WEAKNESS OF RIGHT LOWER EXTREMITY: Primary | ICD-10-CM

## 2025-05-12 DIAGNOSIS — R40.20 LOSS OF CONSCIOUSNESS (MULTI): ICD-10-CM

## 2025-05-12 DIAGNOSIS — I10 ESSENTIAL HYPERTENSION: ICD-10-CM

## 2025-05-12 PROCEDURE — 3008F BODY MASS INDEX DOCD: CPT

## 2025-05-12 PROCEDURE — 99212 OFFICE O/P EST SF 10 MIN: CPT

## 2025-05-12 PROCEDURE — 3074F SYST BP LT 130 MM HG: CPT

## 2025-05-12 PROCEDURE — G2211 COMPLEX E/M VISIT ADD ON: HCPCS

## 2025-05-12 PROCEDURE — 3078F DIAST BP <80 MM HG: CPT

## 2025-05-12 SDOH — ECONOMIC STABILITY: FOOD INSECURITY: WITHIN THE PAST 12 MONTHS, THE FOOD YOU BOUGHT JUST DIDN'T LAST AND YOU DIDN'T HAVE MONEY TO GET MORE.: NEVER TRUE

## 2025-05-12 SDOH — ECONOMIC STABILITY: FOOD INSECURITY: WITHIN THE PAST 12 MONTHS, YOU WORRIED THAT YOUR FOOD WOULD RUN OUT BEFORE YOU GOT MONEY TO BUY MORE.: NEVER TRUE

## 2025-05-12 ASSESSMENT — ANXIETY QUESTIONNAIRES
6. BECOMING EASILY ANNOYED OR IRRITABLE: NOT AT ALL
7. FEELING AFRAID AS IF SOMETHING AWFUL MIGHT HAPPEN: NOT AT ALL
1. FEELING NERVOUS, ANXIOUS, OR ON EDGE: NOT AT ALL
GAD7 TOTAL SCORE: 0
3. WORRYING TOO MUCH ABOUT DIFFERENT THINGS: NOT AT ALL
5. BEING SO RESTLESS THAT IT IS HARD TO SIT STILL: NOT AT ALL
2. NOT BEING ABLE TO STOP OR CONTROL WORRYING: NOT AT ALL
IF YOU CHECKED OFF ANY PROBLEMS ON THIS QUESTIONNAIRE, HOW DIFFICULT HAVE THESE PROBLEMS MADE IT FOR YOU TO DO YOUR WORK, TAKE CARE OF THINGS AT HOME, OR GET ALONG WITH OTHER PEOPLE: NOT DIFFICULT AT ALL
4. TROUBLE RELAXING: NOT AT ALL

## 2025-05-12 ASSESSMENT — ENCOUNTER SYMPTOMS
RESPIRATORY NEGATIVE: 1
ENDOCRINE NEGATIVE: 1
CONSTITUTIONAL NEGATIVE: 1
GASTROINTESTINAL NEGATIVE: 1
NEUROLOGICAL NEGATIVE: 1
EYES NEGATIVE: 1
HEMATOLOGIC/LYMPHATIC NEGATIVE: 1
CARDIOVASCULAR NEGATIVE: 1
MUSCULOSKELETAL NEGATIVE: 1
PSYCHIATRIC NEGATIVE: 1

## 2025-05-12 ASSESSMENT — PAIN SCALES - GENERAL: PAINLEVEL_OUTOF10: 0-NO PAIN

## 2025-05-12 ASSESSMENT — LIFESTYLE VARIABLES
HOW OFTEN DO YOU HAVE SIX OR MORE DRINKS ON ONE OCCASION: DAILY OR ALMOST DAILY
AUDIT-C TOTAL SCORE: 11
SKIP TO QUESTIONS 9-10: 0
HOW MANY STANDARD DRINKS CONTAINING ALCOHOL DO YOU HAVE ON A TYPICAL DAY: 7 TO 9
HOW OFTEN DO YOU HAVE A DRINK CONTAINING ALCOHOL: 4 OR MORE TIMES A WEEK

## 2025-05-12 ASSESSMENT — PATIENT HEALTH QUESTIONNAIRE - PHQ9
SUM OF ALL RESPONSES TO PHQ9 QUESTIONS 1 & 2: 0
2. FEELING DOWN, DEPRESSED OR HOPELESS: NOT AT ALL
1. LITTLE INTEREST OR PLEASURE IN DOING THINGS: NOT AT ALL

## 2025-05-12 NOTE — ASSESSMENT & PLAN NOTE
Was seen in ED on 4/26 for what he describes as neurological event (eyes rolling back in head, shaking bad, loss of consciousness) States he'd had 4 beers prior to going to this event.  Working on cutting back EtOH and cigarettes  ED diagnosed him with transient lower extremity weakness s/t lumbar spondylosis. Improved after medrol dose pack given to him in ED.    Continue following with pain management  Offered resources/medications to help with Tobacco/EtOH cessation, patient declining at this time

## 2025-05-12 NOTE — PROGRESS NOTES
Subjective   Patient ID: Isael Haji is a 64 y.o. male who presents for post ED visit for LLE pain.    Presented to ED On 4/26 for worsening numbness and weakness of the left leg and with new right lower extremity defecits. Right sided defecits waxing and waning with certain positions. Suspicious for lumbar spinal pathology. CT Head d/t hx drinking without any acute abnormalities. Per note from Sola Crowder 4/2/2025 patient was seen by ortho/spine surgery for reevaluation but was told that he needed to stop smoking and decrease EtOH use before any consideration of surgical intervention.     States that he was having his hair and beard shaved for an event at [employer], was told his eyes rolled back and he started shaking really bad and he wasn't responding. Doesn't remember any of this. States he recalls he was asked repeatedly if he was ready to go to [facility] for emergency surgery.     Reports he's down to about 1/2 PPD. Typically drinks 8-12 beers per day but hasn't maninder drinking much since this happened- states he had 4 beers the other day and that was it. Depends on the day.     States that he's trying to quit smoking on his own, not interested in patches, medications, gum etc.     Offered script for acomprosate and is afraid of being sick if he does drink while on therapy. Doesn't want to be sick.     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     Review of Systems   Constitutional: Negative.    HENT: Negative.     Eyes: Negative.    Respiratory: Negative.     Cardiovascular: Negative.    Gastrointestinal: Negative.    Endocrine: Negative.    Genitourinary: Negative.    Musculoskeletal: Negative.    Skin: Negative.    Neurological: Negative.    Hematological: Negative.    Psychiatric/Behavioral: Negative.          Current Medications[1]  Surgical History[2]  Family History[3]   Social History[4]     Objective  "    Visit Vitals  /77 (BP Location: Left arm, Patient Position: Sitting, BP Cuff Size: Adult)   Pulse 60   Temp 36.4 °C (97.5 °F) (Temporal)   Resp 15   Ht 1.651 m (5' 5\")   Wt 65.3 kg (144 lb)   SpO2 97%   BMI 23.96 kg/m²   Smoking Status Every Day   BSA 1.73 m²        Physical Exam  Constitutional:       Appearance: Normal appearance.   HENT:      Head: Normocephalic and atraumatic.   Eyes:      Extraocular Movements: Extraocular movements intact.      Pupils: Pupils are equal, round, and reactive to light.   Cardiovascular:      Rate and Rhythm: Normal rate and regular rhythm.   Pulmonary:      Effort: Pulmonary effort is normal.      Breath sounds: Normal breath sounds.   Abdominal:      General: Abdomen is flat. Bowel sounds are normal.      Palpations: Abdomen is soft.   Musculoskeletal:         General: Normal range of motion.   Skin:     General: Skin is warm and dry.      Capillary Refill: Capillary refill takes less than 2 seconds.   Neurological:      General: No focal deficit present.      Mental Status: He is alert and oriented to person, place, and time.   Psychiatric:         Mood and Affect: Mood normal.         Behavior: Behavior normal.           Assessment/Plan   Problem List Items Addressed This Visit       Essential hypertension    Normotensive in office at 120/77  Home blood pressure 110-140/70-90s    Continue current therapy           Lumbar spondylosis    Was seen in ED on 4/26 for what he describes as neurological event (eyes rolling back in head, shaking bad, loss of consciousness) States he'd had 4 beers prior to going to this event.  Working on cutting back EtOH and cigarettes  ED diagnosed him with transient lower extremity weakness s/t lumbar spondylosis. Improved after medrol dose pack given to him in ED.    Continue following with pain management  Offered resources/medications to help with Tobacco/EtOH cessation, patient declining at this time           Weakness of right lower " extremity - Primary     Other Visit Diagnoses         Loss of consciousness (Multi)        Reports LOC on 4/26 while at event for [facility]  Seizure like activity, hasn't happened since  Refer to neurology for further evaluation    Relevant Orders    Referral to Neurology            All pertinent lab work and results were reviewed with patient.     Follow up with me in as scheduled    HENRY Moore-CNS         [1]   Current Outpatient Medications   Medication Sig Dispense Refill    albuterol 90 mcg/actuation inhaler Inhale 2 puffs every 4 hours if needed for wheezing. 18 g 11    amLODIPine (Norvasc) 10 mg tablet Take 1 tablet (10 mg) by mouth once daily. 90 tablet 3    aspirin 81 mg chewable tablet Chew 1 tablet (81 mg) once daily. Chew and swallow.      atorvastatin (Lipitor) 40 mg tablet Take 1 tablet (40 mg) by mouth once daily. 90 tablet 1    carvedilol (Coreg) 6.25 mg tablet Take 1 tablet (6.25 mg) by mouth 2 times daily (morning and late afternoon). 180 tablet 1    ezetimibe (Zetia) 10 mg tablet TAKE ONE TABLET BY MOUTH DAILY AT 9AM 90 tablet 3    gabapentin (Neurontin) 300 mg capsule Take 3 capsules (900 mg) by mouth once daily at bedtime. 270 capsule 1    lisinopril 40 mg tablet TAKE ONE TABLET BY MOUTH DAILY 90 tablet 3    methocarbamol (Robaxin) 500 mg tablet Take 0.5 tablets (250 mg) by mouth once daily as needed for muscle spasms. 90 tablet 1    miscellaneous medical supply (Blood Pressure Cuff) misc 1 Units once daily. 1 each 0    umeclidinium-vilanteroL (Anoro Ellipta) 62.5-25 mcg/actuation blister with device Inhale 1 puff once daily. 3 each 3     No current facility-administered medications for this visit.   [2]   Past Surgical History:  Procedure Laterality Date    MR HEAD ANGIO WO IV CONTRAST  11/29/2020    MR HEAD ANGIO WO IV CONTRAST 11/29/2020 [facility]    MR NECK ANGIO WO IV CONTRAST  11/29/2020    MR NECK ANGIO WO IV CONTRAST 11/29/2020 [facility]    OTHER  SURGICAL HISTORY  12/09/2020    Rotator cuff repair    OTHER SURGICAL HISTORY  12/09/2020    Cardiac catheterization with stent placement    OTHER SURGICAL HISTORY  12/29/2022    Lower back surgery    OTHER SURGICAL HISTORY  08/31/2021    Arm surgery   [3]   Family History  Problem Relation Name Age of Onset    Diabetes Mother      Lung cancer Mother      Coronary artery disease Father     [4]   Social History  Tobacco Use    Smoking status: Every Day     Current packs/day: 1.00     Types: Cigarettes     Passive exposure: Current    Smokeless tobacco: Never   Vaping Use    Vaping status: Never Used   Substance Use Topics    Alcohol use: Yes     Alcohol/week: 60.0 standard drinks of alcohol     Types: 60 Cans of beer per week     Comment: depends on day, up to 8-12 a day    Drug use: Never

## 2025-05-12 NOTE — PATIENT INSTRUCTIONS
Thank you for coming to see me today.  If you have any questions or concerns following our visit, please contact the office.  Phone: [phone]    Follow up with me as previously scheduled    1)  I am referring you to neurology - please call [phone] to schedule an appointment or schedule at  on your way out. Can also call your insurance company to get the names of providers in network who can see you in the next 1-2 months

## 2025-05-12 NOTE — ASSESSMENT & PLAN NOTE
Normotensive in office at 120/77  Home blood pressure 110-140/70-90s    Continue current therapy

## 2025-05-13 DIAGNOSIS — I10 ESSENTIAL HYPERTENSION: ICD-10-CM

## 2025-05-13 DIAGNOSIS — I25.10 ATHEROSCLEROSIS OF NATIVE CORONARY ARTERY OF NATIVE HEART WITHOUT ANGINA PECTORIS: ICD-10-CM

## 2025-05-13 RX ORDER — LISINOPRIL 40 MG/1
40 TABLET ORAL DAILY
Qty: 90 TABLET | Refills: 1 | Status: SHIPPED | OUTPATIENT
Start: 2025-05-13

## 2025-05-17 LAB
ANION GAP SERPL CALCULATED.4IONS-SCNC: 8 MMOL/L (CALC) (ref 7–17)
BUN SERPL-MCNC: 12 MG/DL (ref 7–25)
BUN/CREAT SERPL: NORMAL (CALC) (ref 6–22)
CALCIUM SERPL-MCNC: 9.5 MG/DL (ref 8.6–10.3)
CHLORIDE SERPL-SCNC: 102 MMOL/L (ref 98–110)
CHOLEST SERPL-MCNC: 137 MG/DL
CHOLEST/HDLC SERPL: 2.9 (CALC)
CO2 SERPL-SCNC: 26 MMOL/L (ref 20–32)
CREAT SERPL-MCNC: 0.72 MG/DL (ref 0.7–1.35)
EGFRCR SERPLBLD CKD-EPI 2021: 103 ML/MIN/1.73M2
ERYTHROCYTE [DISTWIDTH] IN BLOOD BY AUTOMATED COUNT: 12.7 % (ref 11–15)
FOLATE SERPL-MCNC: 16.1 NG/ML
GLUCOSE SERPL-MCNC: 86 MG/DL (ref 65–99)
HCT VFR BLD AUTO: 39.6 % (ref 38.5–50)
HDLC SERPL-MCNC: 47 MG/DL
HGB BLD-MCNC: 14 G/DL (ref 13.2–17.1)
LDLC SERPL CALC-MCNC: 77 MG/DL (CALC)
MAGNESIUM SERPL-MCNC: 1.9 MG/DL (ref 1.5–2.5)
MCH RBC QN AUTO: 33.9 PG (ref 27–33)
MCHC RBC AUTO-ENTMCNC: 35.4 G/DL (ref 32–36)
MCV RBC AUTO: 95.9 FL (ref 80–100)
NONHDLC SERPL-MCNC: 90 MG/DL (CALC)
PLATELET # BLD AUTO: 243 THOUSAND/UL (ref 140–400)
PMV BLD REES-ECKER: 10 FL (ref 7.5–12.5)
POTASSIUM SERPL-SCNC: 4.8 MMOL/L (ref 3.5–5.3)
RBC # BLD AUTO: 4.13 MILLION/UL (ref 4.2–5.8)
SODIUM SERPL-SCNC: 136 MMOL/L (ref 135–146)
TRIGL SERPL-MCNC: 54 MG/DL
TSH SERPL-ACNC: 2.4 MIU/L (ref 0.4–4.5)
VIT B1 BLD-SCNC: 137 NMOL/L (ref 78–185)
VIT B12 SERPL-MCNC: 502 PG/ML (ref 200–1100)
WBC # BLD AUTO: 6.5 THOUSAND/UL (ref 3.8–10.8)

## 2025-05-21 RX ORDER — CARVEDILOL 6.25 MG/1
6.25 TABLET ORAL 2 TIMES DAILY
Qty: 180 TABLET | Refills: 1 | Status: SHIPPED | OUTPATIENT
Start: 2025-05-21

## 2025-05-28 LAB — BODY SURFACE AREA: 1.74 M2

## 2025-05-28 PROCEDURE — 93248 EXT ECG>7D<15D REV&INTERPJ: CPT | Performed by: INTERNAL MEDICINE

## 2025-06-24 ENCOUNTER — TELEPHONE (OUTPATIENT)
Dept: UROLOGY | Facility: CLINIC | Age: 65
End: 2025-06-24
Payer: COMMERCIAL

## 2025-06-24 DIAGNOSIS — N52.8 OTHER MALE ERECTILE DYSFUNCTION: ICD-10-CM

## 2025-06-24 DIAGNOSIS — N40.0 BENIGN PROSTATIC HYPERPLASIA, UNSPECIFIED WHETHER LOWER URINARY TRACT SYMPTOMS PRESENT: ICD-10-CM

## 2025-06-24 DIAGNOSIS — Z12.5 SCREENING PSA (PROSTATE SPECIFIC ANTIGEN): ICD-10-CM

## 2025-06-25 RX ORDER — SILDENAFIL 100 MG/1
100 TABLET, FILM COATED ORAL DAILY PRN
Qty: 10 TABLET | Refills: 3 | Status: SHIPPED | OUTPATIENT
Start: 2025-06-25

## 2025-06-25 RX ORDER — SILDENAFIL 100 MG/1
100 TABLET, FILM COATED ORAL DAILY PRN
Qty: 10 TABLET | Refills: 3 | Status: SHIPPED | OUTPATIENT
Start: 2025-06-25 | End: 2025-06-25 | Stop reason: SDUPTHER

## 2025-06-25 NOTE — TELEPHONE ENCOUNTER
Isael Haji [birth year]   refill please on Sildenafil 100mg  pt to check [facility] in [place] after 6pm

## 2025-07-08 RX ORDER — TADALAFIL 20 MG/1
20 TABLET ORAL DAILY PRN
Qty: 5 TABLET | Refills: 0 | Status: SHIPPED | OUTPATIENT
Start: 2025-07-08 | End: 2025-07-18

## 2025-07-09 ENCOUNTER — HOSPITAL ENCOUNTER (OUTPATIENT)
Dept: CARDIOLOGY | Facility: HOSPITAL | Age: 65
Discharge: HOME | End: 2025-07-09
Payer: COMMERCIAL

## 2025-07-09 ENCOUNTER — TELEPHONE (OUTPATIENT)
Dept: CARDIOLOGY | Facility: HOSPITAL | Age: 65
End: 2025-07-09

## 2025-07-09 DIAGNOSIS — J44.9 CHRONIC OBSTRUCTIVE PULMONARY DISEASE, UNSPECIFIED COPD TYPE (MULTI): ICD-10-CM

## 2025-07-09 DIAGNOSIS — E78.00 PURE HYPERCHOLESTEROLEMIA: ICD-10-CM

## 2025-07-09 DIAGNOSIS — I25.10 ATHEROSCLEROSIS OF NATIVE CORONARY ARTERY OF NATIVE HEART WITHOUT ANGINA PECTORIS: ICD-10-CM

## 2025-07-09 DIAGNOSIS — Z72.0 TOBACCO USE: ICD-10-CM

## 2025-07-09 DIAGNOSIS — E78.2 MIXED HYPERLIPIDEMIA: ICD-10-CM

## 2025-07-09 DIAGNOSIS — I10 ESSENTIAL HYPERTENSION: ICD-10-CM

## 2025-07-09 DIAGNOSIS — R55 NEAR SYNCOPE: ICD-10-CM

## 2025-07-09 LAB
AORTIC VALVE MEAN GRADIENT: 3 MMHG
AORTIC VALVE PEAK VELOCITY: 1.25 M/S
AV PEAK GRADIENT: 6 MMHG
AVA (PEAK VEL): 2.01 CM2
AVA (VTI): 1.98 CM2
EJECTION FRACTION APICAL 4 CHAMBER: 66.3
EJECTION FRACTION: 69 %
GLOBAL LONGITUDINAL STRAIN: 19 %
LEFT ATRIUM VOLUME AREA LENGTH INDEX BSA: 23.9 ML/M2
LEFT VENTRICLE INTERNAL DIMENSION DIASTOLE: 4.46 CM (ref 3.5–6)
LEFT VENTRICULAR OUTFLOW TRACT DIAMETER: 1.86 CM
LV EJECTION FRACTION BIPLANE: 69 %
MITRAL VALVE E/A RATIO: 1.19
MITRAL VALVE E/E' RATIO: 8.25
RIGHT VENTRICLE FREE WALL PEAK S': 14.65 CM/S
RIGHT VENTRICLE PEAK SYSTOLIC PRESSURE: 36 MMHG
TRICUSPID ANNULAR PLANE SYSTOLIC EXCURSION: 2.7 CM

## 2025-07-09 PROCEDURE — 93306 TTE W/DOPPLER COMPLETE: CPT | Performed by: INTERNAL MEDICINE

## 2025-07-09 PROCEDURE — 93356 MYOCRD STRAIN IMG SPCKL TRCK: CPT | Performed by: INTERNAL MEDICINE

## 2025-07-09 PROCEDURE — 93356 MYOCRD STRAIN IMG SPCKL TRCK: CPT

## 2025-07-09 NOTE — TELEPHONE ENCOUNTER
7/9/25  1037  Called echocardiogram results and continuation of current care directive to patient with patient verbalizing understanding.        ----- Message from Cassius Jeffers sent at 7/9/2025  9:28 AM EDT -----  Echocardiogram shows normal left ventricular systolic function with an ejection fraction of 69%, normal right ventricular systolic function, mild to moderate tricuspid valve regurgitation.  Continue   current care.  ----- Message -----  From: Radha, Syngo - Cardiology Results In  Sent: 7/9/2025   8:55 AM EDT  To: Cassius Jeffers, DO

## 2025-07-22 PROBLEM — I47.10 PSVT (PAROXYSMAL SUPRAVENTRICULAR TACHYCARDIA): Status: ACTIVE | Noted: 2025-07-22

## 2025-07-22 PROBLEM — R55 NEAR SYNCOPE: Status: ACTIVE | Noted: 2025-07-22

## 2025-08-12 ENCOUNTER — TELEPHONE (OUTPATIENT)
Dept: PULMONOLOGY | Facility: HOSPITAL | Age: 65
End: 2025-08-12
Payer: COMMERCIAL

## 2025-08-13 ENCOUNTER — OFFICE VISIT (OUTPATIENT)
Dept: CARDIOLOGY | Facility: HOSPITAL | Age: 65
End: 2025-08-13
Payer: COMMERCIAL

## 2025-08-13 VITALS
WEIGHT: 144 LBS | HEART RATE: 65 BPM | DIASTOLIC BLOOD PRESSURE: 70 MMHG | SYSTOLIC BLOOD PRESSURE: 118 MMHG | BODY MASS INDEX: 23.99 KG/M2 | HEIGHT: 65 IN

## 2025-08-13 DIAGNOSIS — I25.119 CORONARY ARTERY DISEASE INVOLVING NATIVE HEART WITH ANGINA PECTORIS, UNSPECIFIED VESSEL OR LESION TYPE: ICD-10-CM

## 2025-08-13 DIAGNOSIS — E78.2 MIXED HYPERLIPIDEMIA: ICD-10-CM

## 2025-08-13 DIAGNOSIS — I47.10 PSVT (PAROXYSMAL SUPRAVENTRICULAR TACHYCARDIA): ICD-10-CM

## 2025-08-13 DIAGNOSIS — I25.10 ATHEROSCLEROSIS OF NATIVE CORONARY ARTERY OF NATIVE HEART WITHOUT ANGINA PECTORIS: Primary | ICD-10-CM

## 2025-08-13 DIAGNOSIS — I10 ESSENTIAL HYPERTENSION: ICD-10-CM

## 2025-08-13 DIAGNOSIS — R55 NEAR SYNCOPE: ICD-10-CM

## 2025-08-13 DIAGNOSIS — E78.00 PURE HYPERCHOLESTEROLEMIA: ICD-10-CM

## 2025-08-13 DIAGNOSIS — J44.9 CHRONIC OBSTRUCTIVE PULMONARY DISEASE, UNSPECIFIED COPD TYPE (MULTI): ICD-10-CM

## 2025-08-13 DIAGNOSIS — Z72.0 TOBACCO USE: ICD-10-CM

## 2025-08-13 LAB
ATRIAL RATE: 65 BPM
P AXIS: 44 DEGREES
P OFFSET: 214 MS
P ONSET: 158 MS
PR INTERVAL: 126 MS
Q ONSET: 221 MS
QRS COUNT: 11 BEATS
QRS DURATION: 86 MS
QT INTERVAL: 416 MS
QTC CALCULATION(BAZETT): 432 MS
QTC FREDERICIA: 427 MS
R AXIS: 68 DEGREES
T AXIS: 53 DEGREES
T OFFSET: 429 MS
VENTRICULAR RATE: 65 BPM

## 2025-08-13 PROCEDURE — 99212 OFFICE O/P EST SF 10 MIN: CPT | Performed by: INTERNAL MEDICINE

## 2025-08-13 PROCEDURE — 3008F BODY MASS INDEX DOCD: CPT | Performed by: INTERNAL MEDICINE

## 2025-08-13 PROCEDURE — 3078F DIAST BP <80 MM HG: CPT | Performed by: INTERNAL MEDICINE

## 2025-08-13 PROCEDURE — 93005 ELECTROCARDIOGRAM TRACING: CPT | Performed by: INTERNAL MEDICINE

## 2025-08-13 PROCEDURE — 99214 OFFICE O/P EST MOD 30 MIN: CPT | Performed by: INTERNAL MEDICINE

## 2025-08-13 PROCEDURE — 3074F SYST BP LT 130 MM HG: CPT | Performed by: INTERNAL MEDICINE

## 2025-08-13 RX ORDER — ATORVASTATIN CALCIUM 80 MG/1
80 TABLET, FILM COATED ORAL DAILY
Qty: 90 TABLET | Refills: 3 | Status: SHIPPED | OUTPATIENT
Start: 2025-08-13

## 2025-08-18 ENCOUNTER — HOSPITAL ENCOUNTER (OUTPATIENT)
Dept: RADIOLOGY | Facility: CLINIC | Age: 65
Discharge: HOME | End: 2025-08-18
Payer: COMMERCIAL

## 2025-08-18 ENCOUNTER — OFFICE VISIT (OUTPATIENT)
Dept: PRIMARY CARE | Facility: CLINIC | Age: 65
End: 2025-08-18
Payer: COMMERCIAL

## 2025-08-18 VITALS
HEART RATE: 68 BPM | BODY MASS INDEX: 25.27 KG/M2 | OXYGEN SATURATION: 97 % | RESPIRATION RATE: 20 BRPM | SYSTOLIC BLOOD PRESSURE: 108 MMHG | WEIGHT: 151.7 LBS | TEMPERATURE: 97 F | HEIGHT: 65 IN | DIASTOLIC BLOOD PRESSURE: 62 MMHG

## 2025-08-18 DIAGNOSIS — M25.552 LEFT HIP PAIN: ICD-10-CM

## 2025-08-18 DIAGNOSIS — E78.2 MIXED HYPERLIPIDEMIA: ICD-10-CM

## 2025-08-18 DIAGNOSIS — I25.10 ATHEROSCLEROSIS OF NATIVE CORONARY ARTERY OF NATIVE HEART WITHOUT ANGINA PECTORIS: ICD-10-CM

## 2025-08-18 DIAGNOSIS — I25.119 CORONARY ARTERY DISEASE INVOLVING NATIVE HEART WITH ANGINA PECTORIS, UNSPECIFIED VESSEL OR LESION TYPE: ICD-10-CM

## 2025-08-18 DIAGNOSIS — M25.552 LEFT HIP PAIN: Primary | ICD-10-CM

## 2025-08-18 DIAGNOSIS — M87.052 AVASCULAR NECROSIS OF BONE OF LEFT HIP (MULTI): ICD-10-CM

## 2025-08-18 DIAGNOSIS — M87.052 AVASCULAR NECROSIS OF BONE OF HIP, LEFT (MULTI): ICD-10-CM

## 2025-08-18 PROCEDURE — 99214 OFFICE O/P EST MOD 30 MIN: CPT

## 2025-08-18 PROCEDURE — 3008F BODY MASS INDEX DOCD: CPT

## 2025-08-18 PROCEDURE — 73502 X-RAY EXAM HIP UNI 2-3 VIEWS: CPT | Mod: LT

## 2025-08-18 PROCEDURE — 73502 X-RAY EXAM HIP UNI 2-3 VIEWS: CPT | Mod: LEFT SIDE | Performed by: STUDENT IN AN ORGANIZED HEALTH CARE EDUCATION/TRAINING PROGRAM

## 2025-08-18 PROCEDURE — 3078F DIAST BP <80 MM HG: CPT

## 2025-08-18 PROCEDURE — 3074F SYST BP LT 130 MM HG: CPT

## 2025-08-18 RX ORDER — TRAMADOL HYDROCHLORIDE 50 MG/1
50 TABLET, FILM COATED ORAL EVERY 6 HOURS PRN
Qty: 12 TABLET | Refills: 0 | Status: SHIPPED | OUTPATIENT
Start: 2025-08-18 | End: 2025-08-25

## 2025-08-18 SDOH — ECONOMIC STABILITY: FOOD INSECURITY: WITHIN THE PAST 12 MONTHS, THE FOOD YOU BOUGHT JUST DIDN'T LAST AND YOU DIDN'T HAVE MONEY TO GET MORE.: OFTEN TRUE

## 2025-08-18 SDOH — ECONOMIC STABILITY: FOOD INSECURITY: WITHIN THE PAST 12 MONTHS, YOU WORRIED THAT YOUR FOOD WOULD RUN OUT BEFORE YOU GOT MONEY TO BUY MORE.: OFTEN TRUE

## 2025-08-18 ASSESSMENT — ENCOUNTER SYMPTOMS
RESPIRATORY NEGATIVE: 1
ENDOCRINE NEGATIVE: 1
NEUROLOGICAL NEGATIVE: 1
EYES NEGATIVE: 1
MUSCULOSKELETAL NEGATIVE: 1
CARDIOVASCULAR NEGATIVE: 1
PSYCHIATRIC NEGATIVE: 1
HEMATOLOGIC/LYMPHATIC NEGATIVE: 1
OCCASIONAL FEELINGS OF UNSTEADINESS: 0
CONSTITUTIONAL NEGATIVE: 1
DEPRESSION: 0
LOSS OF SENSATION IN FEET: 1
GASTROINTESTINAL NEGATIVE: 1

## 2025-08-18 ASSESSMENT — LIFESTYLE VARIABLES
SKIP TO QUESTIONS 9-10: 0
HOW MANY STANDARD DRINKS CONTAINING ALCOHOL DO YOU HAVE ON A TYPICAL DAY: 1 OR 2
HOW OFTEN DO YOU HAVE A DRINK CONTAINING ALCOHOL: 2-3 TIMES A WEEK
AUDIT-C TOTAL SCORE: 4
HOW OFTEN DO YOU HAVE SIX OR MORE DRINKS ON ONE OCCASION: LESS THAN MONTHLY

## 2025-08-18 ASSESSMENT — PAIN SCALES - GENERAL: PAINLEVEL_OUTOF10: 8

## 2025-08-18 ASSESSMENT — PATIENT HEALTH QUESTIONNAIRE - PHQ9
1. LITTLE INTEREST OR PLEASURE IN DOING THINGS: NOT AT ALL
2. FEELING DOWN, DEPRESSED OR HOPELESS: NOT AT ALL
SUM OF ALL RESPONSES TO PHQ9 QUESTIONS 1 & 2: 0

## 2025-08-26 DIAGNOSIS — I25.119 CORONARY ARTERY DISEASE INVOLVING NATIVE HEART WITH ANGINA PECTORIS, UNSPECIFIED VESSEL OR LESION TYPE: ICD-10-CM

## 2025-08-26 DIAGNOSIS — E78.2 MIXED HYPERLIPIDEMIA: ICD-10-CM

## 2025-08-26 DIAGNOSIS — I25.10 ATHEROSCLEROSIS OF NATIVE CORONARY ARTERY OF NATIVE HEART WITHOUT ANGINA PECTORIS: ICD-10-CM

## 2025-08-26 RX ORDER — ATORVASTATIN CALCIUM 80 MG/1
80 TABLET, FILM COATED ORAL DAILY
Qty: 90 TABLET | Refills: 3 | Status: SHIPPED | OUTPATIENT
Start: 2025-08-26

## 2025-08-27 ENCOUNTER — TELEPHONE (OUTPATIENT)
Dept: CARDIOLOGY | Facility: HOSPITAL | Age: 65
End: 2025-08-27
Payer: COMMERCIAL

## 2025-08-28 ENCOUNTER — TELEPHONE (OUTPATIENT)
Dept: CARDIOLOGY | Facility: HOSPITAL | Age: 65
End: 2025-08-28
Payer: COMMERCIAL

## 2025-09-05 ENCOUNTER — APPOINTMENT (OUTPATIENT)
Dept: ORTHOPEDIC SURGERY | Facility: CLINIC | Age: 65
End: 2025-09-05
Payer: COMMERCIAL

## 2025-09-05 ASSESSMENT — PAIN SCALES - GENERAL: PAINLEVEL_OUTOF10: 8

## 2025-09-05 ASSESSMENT — PAIN - FUNCTIONAL ASSESSMENT: PAIN_FUNCTIONAL_ASSESSMENT: 0-10

## 2025-09-22 ENCOUNTER — APPOINTMENT (OUTPATIENT)
Dept: PRIMARY CARE | Facility: CLINIC | Age: 65
End: 2025-09-22
Payer: COMMERCIAL

## 2025-11-17 ENCOUNTER — APPOINTMENT (OUTPATIENT)
Dept: PULMONOLOGY | Facility: HOSPITAL | Age: 65
End: 2025-11-17
Payer: COMMERCIAL

## 2026-01-09 ENCOUNTER — APPOINTMENT (OUTPATIENT)
Dept: UROLOGY | Facility: CLINIC | Age: 66
End: 2026-01-09
Payer: COMMERCIAL

## 2026-03-19 ENCOUNTER — APPOINTMENT (OUTPATIENT)
Dept: PRIMARY CARE | Facility: CLINIC | Age: 66
End: 2026-03-19
Payer: COMMERCIAL